# Patient Record
Sex: FEMALE | Race: WHITE | Employment: OTHER | URBAN - METROPOLITAN AREA
[De-identification: names, ages, dates, MRNs, and addresses within clinical notes are randomized per-mention and may not be internally consistent; named-entity substitution may affect disease eponyms.]

---

## 2017-10-05 ENCOUNTER — OFFICE VISIT (OUTPATIENT)
Dept: INTERNAL MEDICINE CLINIC | Age: 77
End: 2017-10-05

## 2017-10-05 VITALS
TEMPERATURE: 98.1 F | DIASTOLIC BLOOD PRESSURE: 111 MMHG | BODY MASS INDEX: 30.82 KG/M2 | HEIGHT: 65 IN | SYSTOLIC BLOOD PRESSURE: 172 MMHG | RESPIRATION RATE: 12 BRPM | HEART RATE: 74 BPM | WEIGHT: 185 LBS

## 2017-10-05 DIAGNOSIS — K63.5 POLYP OF COLON, UNSPECIFIED PART OF COLON, UNSPECIFIED TYPE: ICD-10-CM

## 2017-10-05 DIAGNOSIS — F41.8 SITUATIONAL ANXIETY: Primary | ICD-10-CM

## 2017-10-05 DIAGNOSIS — C50.911 MALIGNANT NEOPLASM OF RIGHT FEMALE BREAST, UNSPECIFIED ESTROGEN RECEPTOR STATUS, UNSPECIFIED SITE OF BREAST (HCC): ICD-10-CM

## 2017-10-05 DIAGNOSIS — I10 BENIGN ESSENTIAL HTN: ICD-10-CM

## 2017-10-05 DIAGNOSIS — N89.8 VAGINAL DISCHARGE: ICD-10-CM

## 2017-10-05 RX ORDER — LANOLIN ALCOHOL/MO/W.PET/CERES
1000 CREAM (GRAM) TOPICAL DAILY
COMMUNITY
End: 2018-11-15 | Stop reason: ALTCHOICE

## 2017-10-05 NOTE — MR AVS SNAPSHOT
Visit Information Date & Time Provider Department Dept. Phone Encounter #  
 10/5/2017 11:00 AM Ras Richardson MD [facility] [address] [id number] Upcoming Health Maintenance Date Due DTaP/Tdap/Td series (1 - Tdap) 7/9/1962 ZOSTER VACCINE AGE 60> 5/9/2001 GLAUCOMA SCREENING Q2Y 7/9/2006 OSTEOPOROSIS SCREENING (DEXA) 7/9/2006 Pneumococcal 65+ Low/Medium Risk (1 of 2 - PCV13) 7/9/2006 MEDICARE YEARLY EXAM 7/9/2006 INFLUENZA AGE 9 TO ADULT 8/1/2017 Allergies as of 10/5/2017  Review Complete On: 10/5/2017 By: Ras Richardson MD  
 No Known Allergies Current Immunizations  Never Reviewed No immunizations on file. Not reviewed this visit You Were Diagnosed With   
  
 Codes Comments Situational anxiety    -  Primary ICD-10-CM: F41.8 ICD-9-CM: 300.09 Benign essential HTN     ICD-10-CM: I10 
ICD-9-CM: 401.1 Vaginal discharge     ICD-10-CM: N89.8 ICD-9-CM: 623.5 Polyp of colon, unspecified part of colon, unspecified type     ICD-10-CM: K63.5 ICD-9-CM: 211.3 Vitals BP Pulse Temp Resp Height(growth percentile) Weight(growth percentile) (!) 172/111 (BP 1 Location: Left arm, BP Patient Position: Sitting) 74 98.1 °F (36.7 °C) (Oral) 12 5' 5\" (1.651 m) 185 lb (83.9 kg) BMI OB Status Smoking Status 30.79 kg/m2 Postmenopausal Never Smoker Vitals History BMI and BSA Data Body Mass Index Body Surface Area 30.79 kg/m 2 1.96 m 2 Your Updated Medication List  
  
   
This list is accurate as of: 10/5/17 12:01 PM.  Always use your most recent med list.  
  
  
  
  
 VITAMIN B-12 1,000 mcg tablet Generic drug:  cyanocobalamin Take 1,000 mcg by mouth daily. We Performed the Following REFERRAL TO OBSTETRICS AND GYNECOLOGY [REF51 Custom] Comments:  
 Please evaluate patient for vaginal discharge. Referral Information Referral ID Referred By Referred To  
  
 [id number] Delmis HILL MD   
   [address] Phone: [phone] Fax: [phone] Visits Status Start Date End Date 1 New Request 10/5/17 10/5/18 If your referral has a status of pending review or denied, additional information will be sent to support the outcome of this decision. Introducing [facility]! Naresh Balbuena introduces [employer] patient portal. Now you can access parts of your medical record, email your doctor's office, and request medication refills online. 1. In your internet browser, go to [email or web address]. Take the Interview/[email or web address] 2. Click on the First Time User? Click Here link in the Sign In box. You will see the New Member Sign Up page. 3. Enter your InH[employer] Access Code exactly as it appears below. You will not need to use this code after youve completed the sign-up process. If you do not sign up before the expiration date, you must request a new code. · [employer] Access Code: [email or web address] Expires: 1/3/2018 10:43 AM 
 
4. Enter the last four digits of your Social Security Number (xxxx) and Date of Birth (mm/dd/yyyy) as indicated and click Submit. You will be taken to the next sign-up page. 5. Create a InHiro ID. This will be your InHiro login ID and cannot be changed, so think of one that is secure and easy to remember. 6. Create a InH[employer] password. You can change your password at any time. 7. Enter your Password Reset Question and Answer. This can be used at a later time if you forget your password. 8. Enter your e-mail address. You will receive e-mail notification when new information is available in [address] 9. Click Sign Up. You can now view and download portions of your medical record. 10. Click the Download Summary menu link to download a portable copy of your medical information. If you have questions, please visit the Frequently Asked Questions section of the [employer] website. Remember, [employer] is NOT to be used for urgent needs. For medical emergencies, dial 911. Now available from your iPhone and Android! Please provide this summary of care documentation to your next provider. If you have any questions after today's visit, please call [phone].

## 2017-10-05 NOTE — PROGRESS NOTES
HISTORY OF PRESENT ILLNESS    New patient to my practice, referred to me by her son Corry Minaya who works for [employer]. Prior medical care has been from [place]. 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 her  past medical history was reviewed, discussed, and summarized in the list below. Moved from [place] in June. She is anxious and a somewhat tangental historian. Has \"gut issues\". Feels her stress is in her gut a lot of the time. Hypertension- says \"it is always high since I am anxious at [address] take medications\". Hypertension ROS: no medication side effects noted, no TIA's, no chest pain on exertion, no dyspnea on exertion, no swelling of ankles     reports that she has never smoked. She has never used smokeless tobacco.    reports that she drinks about 1.2 oz of alcohol per week    BP Readings from Last 2 Encounters:   10/05/17 (!) 172/111     Seen in urgent care 9/2/17 [facility]. Was dx w UTI and was given 10 days of bactrim which she took. Caused abd cramps. Believe she got this infection since someone defecated in a pool. [place] did not agree with her. Would like a referral to Gyn. Has a vaginal discharge. Thinks amada. Had colonoscopy in 2016. Had a benign polyp. Recommended repeat 2019.  with dementia s/s and irritability. He has not been evaluated for this since he has not seen a doctor. His is not well bathed (but then says he showers every other day). or well groomed. He can come across as calm, so looks better than he is. This is stressful. He is spilling things, having small MVAs. She is afraid to confront him, but he is not violent with her. Living at home is chaotic. She says he house is in boxes and \"it is a terrible life\". \"every day is hell\". Justin Townsend is her son-in-law and can not confront him.        Review of Systems   All other systems reviewed and are negative, except as noted in HPI      Past Medical and Surgical History  Past Medical History:   Diagnosis Date    Benign essential HTN 10/5/2017    Breast cancer ([facility] [address] 11/2004    right  - s/p lumpectomy w sentinal node 11/2004. s/p XRT 1 mo.  could not tolerate tamoxifen    IBS (irritable bowel syndrome)     Situational anxiety      with dementia    UTI (urinary tract infection)     recurrent. last 9/2017        has a past surgical history that includes cholecystectomy; knee arthroscopy (Right, 04/11/2017); and colonoscopy (2016). Current Outpatient Prescriptions   Medication Sig    cyanocobalamin (VITAMIN B-12) 1,000 mcg tablet Take 1,000 mcg by mouth daily. No current facility-administered medications for this visit. reports that she has never smoked. She has never used smokeless tobacco. She reports that she drinks about 1.2 oz of alcohol per week     family history includes Diabetes in her father; Heart Disease in her father; Stroke in her mother. Physical Exam   Nursing note and vitals reviewed. Blood pressure (!) 172/111, pulse 74, temperature 98.1 °F (36.7 °C), temperature source Oral, resp. rate 12, height 5' 5\" (1.651 m), weight 185 lb (83.9 kg). Constitutional: oriented to person, place, and time. No distress. Eyes: Conjunctivae are normal.   HEENT:  No cervical lymphadenopathy. No thyroid nodules or goiter  Cardiovascular: Normal rate. Regular rhythm, no murmurs, rubs. No edema  Pulmonary/Chest: Effort normal. clear to auscultation  Abdominal: soft, non-tender, non-distended  Musculoskeletal:     Neurological: Alert and oriented to person, place. Cranial nerves grossly intact. Normal gait   Skin: No rash noted. Psychiatric: Normal mood and affect. Behavior is normal.     ASSESSMENT and PLAN  Diagnoses and all orders for this visit:    1. Situational anxiety  This is significant. She does not feel safe at home. She has a poor quality of life dealing with her  with dementia.   Advised consideration of an SSRI, she declined. 2. Benign essential HTN -severe, uncontrolled. She is aware of risk of untreated HTN, including sudden death, stroke, MI. She feels stress causes some of it and also refuses to take medications since she is \"sensitive to [employer]". Discussed the high risk of not taking medications. I think beta-blocker would help this an anxiety    3. Vaginal discharge  -     REFERRAL TO OBSTETRICS AND GYNECOLOGY    4. Polyp of colon, unspecified part of colon, unspecified type - repeat 2019? Will get report    5.  Malignant neoplasm of right female breast, unspecified estrogen receptor status, unspecified site of breast ([facility] [address] 2004  Will get records        There are no Patient Instructions on file for this visit.    lab results and schedule of future lab studies reviewed with patient  reviewed medications and side effects in detail    Follow-up Disposition: Not on File

## 2017-10-05 NOTE — PROGRESS NOTES
States her son selected you. . Moved from [place] in June. Has gut issues. Got a bacterial infection from a pool. [place] did not agree with her. Would like a referral to Gyn. Has a vaginal discharge. Thinks amada. Had colonoscopy a few years ago. Had a benign polyp. Recommended repeat 2019.  with dementia. This is stressful.

## 2017-11-13 ENCOUNTER — HOSPITAL ENCOUNTER (EMERGENCY)
Age: 77
Discharge: HOME | End: 2017-11-13
Attending: EMERGENCY MEDICINE
Payer: MEDICARE

## 2017-11-13 ENCOUNTER — APPOINTMENT (OUTPATIENT)
Dept: CT IMAGING | Age: 77
End: 2017-11-13
Attending: EMERGENCY MEDICINE
Payer: MEDICARE

## 2017-11-13 ENCOUNTER — APPOINTMENT (OUTPATIENT)
Dept: GENERAL RADIOLOGY | Age: 77
End: 2017-11-13
Attending: EMERGENCY MEDICINE
Payer: MEDICARE

## 2017-11-13 VITALS
HEART RATE: 69 BPM | SYSTOLIC BLOOD PRESSURE: 145 MMHG | TEMPERATURE: 98.4 F | OXYGEN SATURATION: 98 % | RESPIRATION RATE: 12 BRPM | HEIGHT: 65 IN | BODY MASS INDEX: 31.22 KG/M2 | DIASTOLIC BLOOD PRESSURE: 63 MMHG | WEIGHT: 187.39 LBS

## 2017-11-13 DIAGNOSIS — R51.9 NONINTRACTABLE HEADACHE, UNSPECIFIED CHRONICITY PATTERN, UNSPECIFIED HEADACHE TYPE: Primary | ICD-10-CM

## 2017-11-13 LAB
ALBUMIN SERPL-MCNC: 3.7 G/DL (ref 3.5–5)
ALBUMIN/GLOB SERPL: 1.1 {RATIO} (ref 1.1–2.2)
ALP SERPL-CCNC: 112 U/L (ref 45–117)
ALT SERPL-CCNC: 20 U/L (ref 12–78)
ANION GAP SERPL CALC-SCNC: 9 MMOL/L (ref 5–15)
APPEARANCE UR: CLEAR
AST SERPL-CCNC: 16 U/L (ref 15–37)
ATRIAL RATE: 81 BPM
BACTERIA URNS QL MICRO: NEGATIVE /HPF
BASOPHILS # BLD: 0 K/UL (ref 0–0.1)
BASOPHILS NFR BLD: 0 % (ref 0–1)
BILIRUB SERPL-MCNC: 0.2 MG/DL (ref 0.2–1)
BILIRUB UR QL: NEGATIVE
BUN SERPL-MCNC: 19 MG/DL (ref 6–20)
BUN/CREAT SERPL: 27 (ref 12–20)
CALCIUM SERPL-MCNC: 9.7 MG/DL (ref 8.5–10.1)
CALCULATED P AXIS, ECG09: 42 DEGREES
CALCULATED R AXIS, ECG10: 0 DEGREES
CALCULATED T AXIS, ECG11: 45 DEGREES
CHLORIDE SERPL-SCNC: 104 MMOL/L (ref 97–108)
CK MB CFR SERPL CALC: 1.4 % (ref 0–2.5)
CK MB SERPL-MCNC: 1.4 NG/ML (ref 5–25)
CK SERPL-CCNC: 102 U/L (ref 26–192)
CO2 SERPL-SCNC: 27 MMOL/L (ref 21–32)
COLOR UR: ABNORMAL
CREAT SERPL-MCNC: 0.7 MG/DL (ref 0.55–1.02)
DIAGNOSIS, 93000: NORMAL
DIFFERENTIAL METHOD BLD: NORMAL
EOSINOPHIL # BLD: 0.2 K/UL (ref 0–0.4)
EOSINOPHIL NFR BLD: 2 % (ref 0–7)
EPITH CASTS URNS QL MICRO: ABNORMAL /LPF
ERYTHROCYTE [DISTWIDTH] IN BLOOD BY AUTOMATED COUNT: 13.9 % (ref 11.5–14.5)
GLOBULIN SER CALC-MCNC: 3.3 G/DL (ref 2–4)
GLUCOSE BLD STRIP.AUTO-MCNC: 109 MG/DL (ref 65–100)
GLUCOSE SERPL-MCNC: 105 MG/DL (ref 65–100)
GLUCOSE UR STRIP.AUTO-MCNC: NEGATIVE MG/DL
HCT VFR BLD AUTO: 39.6 % (ref 35–47)
HGB BLD-MCNC: 12.9 G/DL (ref 11.5–16)
HGB UR QL STRIP: ABNORMAL
KETONES UR QL STRIP.AUTO: NEGATIVE MG/DL
LEUKOCYTE ESTERASE UR QL STRIP.AUTO: NEGATIVE
LYMPHOCYTES # BLD: 2.7 K/UL (ref 0.8–3.5)
LYMPHOCYTES NFR BLD: 32 % (ref 12–49)
MAGNESIUM SERPL-MCNC: 2 MG/DL (ref 1.6–2.4)
MCH RBC QN AUTO: 29.3 PG (ref 26–34)
MCHC RBC AUTO-ENTMCNC: 32.6 G/DL (ref 30–36.5)
MCV RBC AUTO: 89.8 FL (ref 80–99)
MONOCYTES # BLD: 0.6 K/UL (ref 0–1)
MONOCYTES NFR BLD: 7 % (ref 5–13)
NEUTS SEG # BLD: 4.9 K/UL (ref 1.8–8)
NEUTS SEG NFR BLD: 59 % (ref 32–75)
NITRITE UR QL STRIP.AUTO: NEGATIVE
P-R INTERVAL, ECG05: 160 MS
PH UR STRIP: 6 [PH] (ref 5–8)
PLATELET # BLD AUTO: 277 K/UL (ref 150–400)
POTASSIUM SERPL-SCNC: 3.9 MMOL/L (ref 3.5–5.1)
PROT SERPL-MCNC: 7 G/DL (ref 6.4–8.2)
PROT UR STRIP-MCNC: NEGATIVE MG/DL
Q-T INTERVAL, ECG07: 368 MS
QRS DURATION, ECG06: 82 MS
QTC CALCULATION (BEZET), ECG08: 427 MS
RBC # BLD AUTO: 4.41 M/UL (ref 3.8–5.2)
RBC #/AREA URNS HPF: ABNORMAL /HPF (ref 0–5)
SERVICE CMNT-IMP: ABNORMAL
SODIUM SERPL-SCNC: 140 MMOL/L (ref 136–145)
SP GR UR REFRACTOMETRY: <1.005 (ref 1–1.03)
TROPONIN I SERPL-MCNC: <0.04 NG/ML
UA: UC IF INDICATED,UAUC: ABNORMAL
UROBILINOGEN UR QL STRIP.AUTO: 0.2 EU/DL (ref 0.2–1)
VENTRICULAR RATE, ECG03: 81 BPM
WBC # BLD AUTO: 8.3 K/UL (ref 3.6–11)
WBC URNS QL MICRO: ABNORMAL /HPF (ref 0–4)

## 2017-11-13 PROCEDURE — 85025 COMPLETE CBC W/AUTO DIFF WBC: CPT | Performed by: EMERGENCY MEDICINE

## 2017-11-13 PROCEDURE — 93005 ELECTROCARDIOGRAM TRACING: CPT

## 2017-11-13 PROCEDURE — 71020 XR CHEST PA LAT: CPT

## 2017-11-13 PROCEDURE — 99285 EMERGENCY DEPT VISIT HI MDM: CPT

## 2017-11-13 PROCEDURE — 70450 CT HEAD/BRAIN W/O DYE: CPT

## 2017-11-13 PROCEDURE — 80053 COMPREHEN METABOLIC PANEL: CPT | Performed by: EMERGENCY MEDICINE

## 2017-11-13 PROCEDURE — 36415 COLL VENOUS BLD VENIPUNCTURE: CPT | Performed by: EMERGENCY MEDICINE

## 2017-11-13 PROCEDURE — 94762 N-INVAS EAR/PLS OXIMTRY CONT: CPT

## 2017-11-13 PROCEDURE — 82962 GLUCOSE BLOOD TEST: CPT

## 2017-11-13 PROCEDURE — 81001 URINALYSIS AUTO W/SCOPE: CPT | Performed by: EMERGENCY MEDICINE

## 2017-11-13 PROCEDURE — 84484 ASSAY OF TROPONIN QUANT: CPT | Performed by: EMERGENCY MEDICINE

## 2017-11-13 PROCEDURE — 82550 ASSAY OF CK (CPK): CPT | Performed by: EMERGENCY MEDICINE

## 2017-11-13 PROCEDURE — 83735 ASSAY OF MAGNESIUM: CPT | Performed by: EMERGENCY MEDICINE

## 2017-11-13 NOTE — ED TRIAGE NOTES
Patient presents via wheelchair to treatment area. Patient complains of feeling dizzy when she stood up while working around the house today. Patient then began with a headache and feeling \"bloated\" in her head about one half hour PTA. Patient states she took two aspirin at time on onset. States she feels pressure behind her eyes. No unilateral weakness.

## 2017-11-13 NOTE — ED NOTES
Patient returned from CT in no distress. Patient ambulatory to restroom with one assist; gait steady. Spouse remains at bedside.

## 2017-11-13 NOTE — ED NOTES
The patient was discharged home by Dr. Víctor Caceres in stable condition. The patient is alert and oriented, in no respiratory distress and discharge vital signs obtained. The patient's diagnosis, condition and treatment were explained. The patient expressed understanding. No prescriptions given. No work/school note given. A discharge plan has been developed. A  was not involved in the process. Aftercare instructions were given. Pt ambulatory out of the ED with spouse.

## 2017-11-13 NOTE — DISCHARGE INSTRUCTIONS
Headache: Care Instructions  Your Care Instructions    Headaches have many possible causes. Most headaches aren't a sign of a more serious problem, and they will get better on their own. Home treatment may help you feel better faster. The doctor has checked you carefully, but problems can develop later. If you notice any problems or new symptoms, get medical treatment right away. Follow-up care is a key part of your treatment and safety. Be sure to make and go to all appointments, and call your doctor if you are having problems. It's also a good idea to know your test results and keep a list of the medicines you take. How can you care for yourself at home? · Do not drive if you have taken a prescription pain medicine. · Rest in a quiet, dark room until your headache is gone. Close your eyes and try to relax or go to sleep. Don't watch TV or read. · Put a cold, moist cloth or cold pack on the painful area for 10 to 20 minutes at a time. Put a thin cloth between the cold pack and your skin. · Use a warm, moist towel or a heating pad set on low to relax tight shoulder and neck muscles. · Have someone gently massage your neck and shoulders. · Take pain medicines exactly as directed. ¨ If the doctor gave you a prescription medicine for pain, take it as prescribed. ¨ If you are not taking a prescription pain medicine, ask your doctor if you can take an over-the-counter medicine. · Be careful not to take pain medicine more often than the instructions allow, because you may get worse or more frequent headaches when the medicine wears off. · Do not ignore new symptoms that occur with a headache, such as a fever, weakness or numbness, vision changes, or confusion. These may be signs of a more serious problem. To prevent headaches  · Keep a headache diary so you can figure out what triggers your headaches. Avoiding triggers may help you prevent headaches.  Record when each headache began, how long it lasted, and what the pain was like (throbbing, aching, stabbing, or dull). Write down any other symptoms you had with the headache, such as nausea, flashing lights or dark spots, or sensitivity to bright light or loud noise. Note if the headache occurred near your period. List anything that might have triggered the headache, such as certain foods (chocolate, cheese, wine) or odors, smoke, bright light, stress, or lack of sleep. · Find healthy ways to deal with stress. Headaches are most common during or right after stressful times. Take time to relax before and after you do something that has caused a headache in the past.  · Try to keep your muscles relaxed by keeping good posture. Check your jaw, face, neck, and shoulder muscles for tension, and try relaxing them. When sitting at a desk, change positions often, and stretch for 30 seconds each hour. · Get plenty of sleep and exercise. · Eat regularly and well. Long periods without food can trigger a headache. · Treat yourself to a massage. Some people find that regular massages are very helpful in relieving tension. · Limit caffeine by not drinking too much coffee, tea, or soda. But don't quit caffeine suddenly, because that can also give you headaches. · Reduce eyestrain from computers by blinking frequently and looking away from the computer screen every so often. Make sure you have proper eyewear and that your monitor is set up properly, about an arm's length away. · Seek help if you have depression or anxiety. Your headaches may be linked to these conditions. Treatment can both prevent headaches and help with symptoms of anxiety or depression. When should you call for help? Call 911 anytime you think you may need emergency care. For example, call if:  ? · You have signs of a stroke. These may include:  ¨ Sudden numbness, paralysis, or weakness in your face, arm, or leg, especially on only one side of your body. ¨ Sudden vision changes.   ¨ Sudden trouble speaking. ¨ Sudden confusion or trouble understanding simple statements. ¨ Sudden problems with walking or balance. ¨ A sudden, severe headache that is different from past headaches. ?Call your doctor now or seek immediate medical care if:  ? · You have a new or worse headache. ? · Your headache gets much worse. Where can you learn more? Go to http://abdi-saroj.info/. Enter M271 in the search box to learn more about \"Headache: Care Instructions. \"  Current as of: October 14, 2016  Content Version: 11.4  © 1265-3469 [employer]. Care instructions adapted under license by [employer] (which disclaims liability or warranty for this information). If you have questions about a medical condition or this instruction, always ask your healthcare professional. [address] any warranty or liability for your use of this information. We hope that we have addressed all of your medical concerns. The examination and treatment you received in the Emergency Department were for an emergent problem and were not intended as complete care. It is important that you follow up with your healthcare provider(s) for ongoing care. If your symptoms worsen or do not improve as expected, and you are unable to reach your usual health care provider(s), you should return to the Emergency Department. Today's healthcare is undergoing tremendous change, and patient satisfaction surveys are one of the many tools to assess the quality of medical care. You may receive a survey from the [employer] regarding your experience in the Emergency Department. I hope that your experience has been completely positive, particularly the medical care that I provided. As such, please participate in the survey; anything less than excellent does not meet my expectations or intentions.         [facility] and [address] [place] [address] participate in nationally recognized quality of care measures. If your blood pressure is greater than 120/80, as reported below, we urge that you seek medical care to address the potential of high blood pressure, commonly known as hypertension. Hypertension can be hereditary or can be caused by certain medical conditions, pain, stress, or \"white coat syndrome. \"       Please make an appointment with your health care provider(s) for follow up of your Emergency Department visit. VITALS:   Patient Vitals for the past 8 hrs:   Temp Pulse Resp BP SpO2   11/13/17 1700 - 69 12 145/63 98 %   11/13/17 1652 - 68 16 - 99 %   11/13/17 1651 - - - 164/72 -   11/13/17 1600 - 75 13 160/78 97 %   11/13/17 1545 - 77 10 171/73 99 %   11/13/17 1530 - 81 23 163/77 98 %   11/13/17 1522 98.4 °F (36.9 °C) 81 15 167/83 98 %   11/13/17 1515 - 82 13 163/79 97 %          Thank you for allowing us to provide you with medical care today. We realize that you have many choices for your emergency care needs. Please choose us in the future for any continued health care needs. Aileen Ling  [facility], [address] [place] [address].   Office: [phone]            Recent Results (from the past 24 hour(s))   EKG, 12 LEAD, INITIAL    Collection Time: 11/13/17  3:13 PM   Result Value Ref Range    Ventricular Rate 81 BPM    Atrial Rate 81 BPM    P-R Interval 160 ms    QRS Duration 82 ms    Q-T Interval 368 ms    QTC Calculation (Bezet) 427 ms    Calculated P Axis 42 degrees    Calculated R Axis 0 degrees    Calculated T Axis 45 degrees    Diagnosis       Normal sinus rhythm  Moderate voltage criteria for LVH, may be normal variant  Otherwise normal ECG  No previous ECGs available  Confirmed by Alona Aase MD, [id number] ([id number]) on 11/13/2017 3:49:49 PM     GLUCOSE, POC    Collection Time: 11/13/17  3:15 PM   Result Value Ref Range    Glucose (POC) 109 (H) 65 - 100 mg/dL    Performed by Momo Pratt (PCT)    CBC WITH AUTOMATED DIFF    Collection Time: 11/13/17  3:21 PM   Result Value Ref Range    WBC 8.3 3.6 - 11.0 K/uL    RBC 4.41 3.80 - 5.20 M/uL    HGB 12.9 11.5 - 16.0 g/dL    HCT 39.6 35.0 - 47.0 %    MCV 89.8 80.0 - 99.0 FL    MCH 29.3 26.0 - 34.0 PG    MCHC 32.6 30.0 - 36.5 g/dL    RDW 13.9 11.5 - 14.5 %    PLATELET 478 586 - 064 K/uL    NEUTROPHILS 59 32 - 75 %    LYMPHOCYTES 32 12 - 49 %    MONOCYTES 7 5 - 13 %    EOSINOPHILS 2 0 - 7 %    BASOPHILS 0 0 - 1 %    ABS. NEUTROPHILS 4.9 1.8 - 8.0 K/UL    ABS. LYMPHOCYTES 2.7 0.8 - 3.5 K/UL    ABS. MONOCYTES 0.6 0.0 - 1.0 K/UL    ABS. EOSINOPHILS 0.2 0.0 - 0.4 K/UL    ABS. BASOPHILS 0.0 0.0 - 0.1 K/UL    DF AUTOMATED     METABOLIC PANEL, COMPREHENSIVE    Collection Time: 11/13/17  3:21 PM   Result Value Ref Range    Sodium 140 136 - 145 mmol/L    Potassium 3.9 3.5 - 5.1 mmol/L    Chloride 104 97 - 108 mmol/L    CO2 27 21 - 32 mmol/L    Anion gap 9 5 - 15 mmol/L    Glucose 105 (H) 65 - 100 mg/dL    BUN 19 6 - 20 MG/DL    Creatinine 0.70 0.55 - 1.02 MG/DL    BUN/Creatinine ratio 27 (H) 12 - 20      GFR est AA >60 >60 ml/min/1.73m2    GFR est non-AA >60 >60 ml/min/1.73m2    Calcium 9.7 8.5 - 10.1 MG/DL    Bilirubin, total 0.2 0.2 - 1.0 MG/DL    ALT (SGPT) 20 12 - 78 U/L    AST (SGOT) 16 15 - 37 U/L    Alk.  phosphatase 112 45 - 117 U/L    Protein, total 7.0 6.4 - 8.2 g/dL    Albumin 3.7 3.5 - 5.0 g/dL    Globulin 3.3 2.0 - 4.0 g/dL    A-G Ratio 1.1 1.1 - 2.2     CK W/ CKMB & INDEX    Collection Time: 11/13/17  3:21 PM   Result Value Ref Range     26 - 192 U/L    CK - MB 1.4 <3.6 NG/ML    CK-MB Index 1.4 0 - 2.5     MAGNESIUM    Collection Time: 11/13/17  3:21 PM   Result Value Ref Range    Magnesium 2.0 1.6 - 2.4 mg/dL   TROPONIN I    Collection Time: 11/13/17  3:21 PM   Result Value Ref Range    Troponin-I, Qt. <0.04 <0.08 ng/mL   URINALYSIS W/ REFLEX CULTURE    Collection Time: 11/13/17  4:56 PM   Result Value Ref Range    Color YELLOW/STRAW      Appearance CLEAR CLEAR Specific gravity <1.005 1.003 - 1.030    pH (UA) 6.0 5.0 - 8.0      Protein NEGATIVE  NEG mg/dL    Glucose NEGATIVE  NEG mg/dL    Ketone NEGATIVE  NEG mg/dL    Bilirubin NEGATIVE  NEG      Blood TRACE (A) NEG      Urobilinogen 0.2 0.2 - 1.0 EU/dL    Nitrites NEGATIVE  NEG      Leukocyte Esterase NEGATIVE  NEG      WBC 0-4 0 - 4 /hpf    RBC 0-5 0 - 5 /hpf    Epithelial cells FEW FEW /lpf    Bacteria NEGATIVE  NEG /hpf    UA:UC IF INDICATED CULTURE NOT INDICATED BY UA RESULT CNI         Xr Chest Pa Lat    Result Date: 11/13/2017  CLINICAL HISTORY: Chest pain INDICATION: Chest pain COMPARISON: None FINDINGS: PA and lateral views of the chest are obtained. The cardiopericardial silhouette is within normal limits. There is no pleural effusion, pneumothorax or focal consolidation present. There are right axillary surgical clips. IMPRESSION: No acute intrathoracic disease. Ct Head Wo Cont    Result Date: 11/13/2017  EXAM:  CT HEAD WO CONT INDICATION:   headache COMPARISON: None. CONTRAST:  None. TECHNIQUE: Unenhanced CT of the head was performed using 5 mm images. Brain and bone windows were generated. CT dose reduction was achieved through use of a standardized protocol tailored for this examination and automatic exposure control for dose modulation. Adaptive statistical iterative reconstruction (ASIR) was utilized. FINDINGS: The ventricles and sulci are normal in size, shape and configuration and midline. There is no significant white matter disease. There is no intracranial hemorrhage, extra-axial collection, mass, mass effect or midline shift. The basilar cisterns are open. No acute infarct is identified. The bone windows demonstrate no abnormalities. The visualized portions of the paranasal sinuses and mastoid air cells are clear.      IMPRESSION: No acute intracranial abnormality
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
## 2017-11-13 NOTE — ED NOTES
Patient remains resting on stretcher in no distress. Vital signs updated. [place] given for comfort as requested. Patient voices no further requests or concerns at this time. Call bell in reach. No change in patient condition since presentation to ED. Family at bedside.

## 2017-11-13 NOTE — ED NOTES
Dr. Angie Cortes at bedside to evaluate patient. Patient in no distress with spouse at bedside. Dr. Angie Cortes explained plan of care and time constraints' patient verbalizes understanding and agreement. Call bell in reach. Will continue to monitor.

## 2017-11-13 NOTE — ED NOTES
Hourly rounding completed. Toileting offered, ongoing plan of care discussed and pts concerns/questions addressed. Pt informed of time factors with lab/imaging study results. Call bell within reach; will continue to monitor.

## 2017-11-13 NOTE — ED NOTES
Patient repositioned in bed and lights dimmed for comfort. Patient voided 625mL clear yellow urine without difficulty. Patient tolerated ambulation well. Call bell in reach. Will continue to monitor.

## 2017-11-14 ENCOUNTER — TELEPHONE (OUTPATIENT)
Dept: INTERNAL MEDICINE CLINIC | Age: 77
End: 2017-11-14
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
## 2017-11-15 ENCOUNTER — HOSPITAL ENCOUNTER (OUTPATIENT)
Dept: LAB | Age: 77
Discharge: HOME | End: 2017-11-15
Payer: MEDICARE

## 2017-11-15 DIAGNOSIS — Z11.3 SCREEN FOR STD (SEXUALLY TRANSMITTED DISEASE): Primary | ICD-10-CM

## 2017-11-15 PROCEDURE — 86592 SYPHILIS TEST NON-TREP QUAL: CPT

## 2017-11-16 LAB — RPR SER QL: NON REACTIVE

## 2017-11-20 ENCOUNTER — TELEPHONE (OUTPATIENT)
Dept: INTERNAL MEDICINE CLINIC | Age: 77
End: 2017-11-20
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
## 2017-12-21 ENCOUNTER — OFFICE VISIT (OUTPATIENT)
Dept: INTERNAL MEDICINE CLINIC | Age: 77
End: 2017-12-21

## 2017-12-21 DIAGNOSIS — F43.22 ADJUSTMENT DISORDER WITH ANXIOUS MOOD: Primary | ICD-10-CM
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
## 2018-03-26 RX ORDER — AMOXICILLIN 500 MG/1
CAPSULE ORAL
Qty: 4 CAP | Refills: 4 | Status: SHIPPED | OUTPATIENT
Start: 2018-03-26 | End: 2019-01-30 | Stop reason: ALTCHOICE

## 2018-04-18 ENCOUNTER — TELEPHONE (OUTPATIENT)
Dept: INTERNAL MEDICINE CLINIC | Age: 78
End: 2018-04-18

## 2018-04-18 NOTE — TELEPHONE ENCOUNTER
----- Message from Tricia Flores sent at 4/18/2018  2:45 PM EDT -----  Regarding: Dr. Sirena Piedra  Patient is having a decrease in appetite, achy in back, bloating, feeling nauseous, 'uneasy' feeling in stomach. Hard to pass stools. She is having this issue for 3 weeks. She stated she is showing signs of 'pancreatic cancer'. She feels under stress because she just doesn't feel well. Best contact number is [phone].

## 2018-04-19 ENCOUNTER — OFFICE VISIT (OUTPATIENT)
Dept: INTERNAL MEDICINE CLINIC | Age: 78
End: 2018-04-19

## 2018-04-19 VITALS
HEIGHT: 65 IN | OXYGEN SATURATION: 95 % | TEMPERATURE: 97.4 F | SYSTOLIC BLOOD PRESSURE: 130 MMHG | WEIGHT: 189.8 LBS | RESPIRATION RATE: 16 BRPM | BODY MASS INDEX: 31.62 KG/M2 | DIASTOLIC BLOOD PRESSURE: 85 MMHG | HEART RATE: 74 BPM

## 2018-04-19 DIAGNOSIS — Z85.3 HISTORY OF BREAST CANCER: ICD-10-CM

## 2018-04-19 DIAGNOSIS — K58.1 IRRITABLE BOWEL SYNDROME WITH CONSTIPATION: ICD-10-CM

## 2018-04-19 DIAGNOSIS — R63.0 DECREASED APPETITE: ICD-10-CM

## 2018-04-19 DIAGNOSIS — R10.10 UPPER ABDOMINAL PAIN: Primary | ICD-10-CM

## 2018-04-19 RX ORDER — MELATONIN
DAILY
COMMUNITY
End: 2019-06-15

## 2018-04-19 NOTE — PROGRESS NOTES
HISTORY OF PRESENT ILLNESS    Had tooth extraction 4/16. Presents with concerns that she could have pancreatic cancer  Symptoms of upper abdominal discomfort, constipation. It is improved somewhat  Associated symptoms include: none   Treatments tried include: medication not used  Colonoscopy 2016 small polyp  Hx lay  Hx IBS  She is swimming and exercising. Limited by knee pain    Wt Readings from Last 3 Encounters:   04/19/18 189 lb 12.8 oz (86.1 kg)   11/13/17 187 lb 6.3 oz (85 kg)   10/05/17 185 lb (83.9 kg)     Mild left throat itchyness    Review of Systems   All other systems reviewed and are negative, except as noted in HPI    Past Medical and Surgical History   has a past medical history of Benign essential HTN (10/5/2017); Breast cancer ([facility] [address] (11/2004); IBS (irritable bowel syndrome); Situational anxiety; and UTI (urinary tract infection). has a past surgical history that includes hx cholecystectomy; hx knee arthroscopy (Right, 04/11/2017); hx colonoscopy (2016); and hx thyroidectomy (2004). reports that she has never smoked. She has never used smokeless tobacco. She reports that she drinks about 1.2 oz of alcohol per week  She reports that she does not use illicit drugs. family history includes Diabetes in her father; Heart Disease in her father; Stroke in her mother; Thyroid Cancer in her brother. Physical Exam   Nursing note and vitals reviewed. Blood pressure 130/85, pulse 74, temperature 97.4 °F (36.3 °C), temperature source Oral, resp. rate 16, height 5' 5\" (1.651 m), weight 189 lb 12.8 oz (86.1 kg), SpO2 95 %. Constitutional: In no distress. Eyes: Conjunctivae are normal.  HEENT:  No LAD or thyromegaly   Cardiovascular: Normal rate. regular rhythm. No murmurs  No edema  Pulmonary/Chest: Effort normal. clear to ausculation blaterally  Musculoskeletal:  no edema. Abd:  Neurological: Alert and oriented. Grossly intact cranial nerves and motor function. Skin: No rash noted. Psychiatric: Normal mood and affect. Behavior is normal.     ASSESSMENT and PLAN  Diagnoses and all orders for this visit:    1. Upper abdominal pain- resolved  2. History of breast cancer  3. Decreased appetite  4. Irritable bowel syndrome with constipation  Current s/s are resolved and likely due to IBS/constipation. No alarm s/s. I told her I would order CT prn worsening s/s or unexplained weight loss. lab results and schedule of future lab studies reviewed with patient  reviewed medications and side effects in detail  Return to clinic for further evaluation if new symptoms develop or if current symptoms worsen or fail to resolve.

## 2018-04-19 NOTE — PROGRESS NOTES
Florina Vergara is a 69 y.o. female    1. Have you been to the ER, urgent care clinic since your last visit? Hospitalized since your last visit? No    2. Have you seen or consulted any other health care providers outside of the [address] [place] [address] since your last visit? Include any pap smears or colon screening. Yes [facility] on 4/16/18 had 1 tooth removed. Chief Complaint   Patient presents with    Abdominal Pain     nauseous, left side          Visit Vitals    /85 (BP 1 Location: Left arm, BP Patient Position: Sitting)    Pulse 74    Temp 97.4 °F (36.3 °C) (Oral)    Resp 16    Ht 5' 5\" (1.651 m)    Wt 189 lb 12.8 oz (86.1 kg)    SpO2 95%    BMI 31.58 kg/m2     Patient states she thinks she might have pancreatic cancer, she states she read the symptoms in [place] book and she feels she has some of the symptoms.

## 2018-04-19 NOTE — TELEPHONE ENCOUNTER
I spoke with patient, I offered her an appt today at 10:45am. Pt states she will head to the office. She was thankful for the call.

## 2018-10-05 ENCOUNTER — TELEPHONE (OUTPATIENT)
Dept: INTERNAL MEDICINE CLINIC | Age: 78
End: 2018-10-05

## 2018-10-05 NOTE — TELEPHONE ENCOUNTER
Patient is requesting a recommendation for a   American Female GYN provider. Patient states she does not want an  , [place] or any other race due to personal reasons.  She can be reached at [phone]

## 2018-11-15 ENCOUNTER — HOSPITAL ENCOUNTER (OUTPATIENT)
Dept: LAB | Age: 78
Discharge: HOME | End: 2018-11-15
Payer: MEDICARE

## 2018-11-15 ENCOUNTER — OFFICE VISIT (OUTPATIENT)
Dept: INTERNAL MEDICINE CLINIC | Age: 78
End: 2018-11-15

## 2018-11-15 VITALS
HEIGHT: 65 IN | RESPIRATION RATE: 16 BRPM | TEMPERATURE: 97.6 F | WEIGHT: 186.2 LBS | DIASTOLIC BLOOD PRESSURE: 82 MMHG | HEART RATE: 68 BPM | SYSTOLIC BLOOD PRESSURE: 159 MMHG | OXYGEN SATURATION: 95 % | BODY MASS INDEX: 31.02 KG/M2

## 2018-11-15 DIAGNOSIS — Z00.00 MEDICARE ANNUAL WELLNESS VISIT, SUBSEQUENT: Primary | ICD-10-CM

## 2018-11-15 DIAGNOSIS — R53.82 CHRONIC FATIGUE: ICD-10-CM

## 2018-11-15 DIAGNOSIS — Z78.0 ASYMPTOMATIC MENOPAUSAL STATE: ICD-10-CM

## 2018-11-15 DIAGNOSIS — E55.9 VITAMIN D INSUFFICIENCY: ICD-10-CM

## 2018-11-15 DIAGNOSIS — Z85.3 HISTORY OF BREAST CANCER: ICD-10-CM

## 2018-11-15 DIAGNOSIS — Z23 ENCOUNTER FOR IMMUNIZATION: ICD-10-CM

## 2018-11-15 DIAGNOSIS — I10 BENIGN ESSENTIAL HTN: ICD-10-CM

## 2018-11-15 PROBLEM — C50.911 MALIGNANT NEOPLASM OF RIGHT FEMALE BREAST (HCC): Status: RESOLVED | Noted: 2017-10-05 | Resolved: 2018-11-15

## 2018-11-15 PROCEDURE — 80061 LIPID PANEL: CPT

## 2018-11-15 PROCEDURE — 82306 VITAMIN D 25 HYDROXY: CPT

## 2018-11-15 PROCEDURE — 85025 COMPLETE CBC W/AUTO DIFF WBC: CPT

## 2018-11-15 PROCEDURE — 80053 COMPREHEN METABOLIC PANEL: CPT

## 2018-11-15 PROCEDURE — 84443 ASSAY THYROID STIM HORMONE: CPT

## 2018-11-15 PROCEDURE — 36415 COLL VENOUS BLD VENIPUNCTURE: CPT

## 2018-11-15 PROCEDURE — 82607 VITAMIN B-12: CPT

## 2018-11-15 RX ORDER — CYANOCOBALAMIN 1000 UG/ML
1000 INJECTION, SOLUTION INTRAMUSCULAR; SUBCUTANEOUS ONCE
Qty: 1 ML | Refills: 0
Start: 2018-11-15 | End: 2018-11-15

## 2018-11-15 NOTE — PATIENT INSTRUCTIONS
Body Mass Index: Care Instructions Your Care Instructions Body mass index (BMI) can help you see if your weight is raising your risk for health problems. It uses a formula to compare how much you weigh with how tall you are. · A BMI lower than 18.5 is considered underweight. · A BMI between 18.5 and 24.9 is considered healthy. · A BMI between 25 and 29.9 is considered overweight. A BMI of 30 or higher is considered obese. If your BMI is in the normal range, it means that you have a lower risk for weight-related health problems. If your BMI is in the overweight or obese range, you may be at increased risk for weight-related health problems, such as high blood pressure, heart disease, stroke, arthritis or joint pain, and diabetes. If your BMI is in the underweight range, you may be at increased risk for health problems such as fatigue, lower protection (immunity) against illness, muscle loss, bone loss, hair loss, and hormone problems. BMI is just one measure of your risk for weight-related health problems. You may be at higher risk for health problems if you are not active, you eat an unhealthy diet, or you drink too much alcohol or use tobacco products. Follow-up care is a key part of your treatment and safety. Be sure to make and go to all appointments, and call your doctor if you are having problems. It's also a good idea to know your test results and keep a list of the medicines you take. How can you care for yourself at home? · Practice healthy eating habits. This includes eating plenty of fruits, vegetables, whole grains, lean protein, and low-fat dairy. · If your doctor recommends it, get more exercise. Walking is a good choice. Bit by bit, increase the amount you walk every day. Try for at least 30 minutes on most days of the week. · Do not smoke. Smoking can increase your risk for health problems.  If you need help quitting, talk to your doctor about stop-smoking programs and medicines. These can increase your chances of quitting for good. · Limit alcohol to 2 drinks a day for men and 1 drink a day for women. Too much alcohol can cause health problems. If you have a BMI higher than 25 · Your doctor may do other tests to check your risk for weight-related health problems. This may include measuring the distance around your waist. A waist measurement of more than 40 inches in men or 35 inches in women can increase the risk of weight-related health problems. · Talk with your doctor about steps you can take to stay healthy or improve your health. You may need to make lifestyle changes to lose weight and stay healthy, such as changing your diet and getting regular exercise. If you have a BMI lower than 18.5 · Your doctor may do other tests to check your risk for health problems. · Talk with your doctor about steps you can take to stay healthy or improve your health. You may need to make lifestyle changes to gain or maintain weight and stay healthy, such as getting more healthy foods in your diet and doing exercises to build muscle. Where can you learn more? Go to http://abdi-saroj.info/. Enter S176 in the search box to learn more about \"Body Mass Index: Care Instructions. \" Current as of: October 13, 2016 Content Version: 11.4 © 5547-0516 [employer]. Care instructions adapted under license by [employer] (which disclaims liability or warranty for this information). If you have questions about a medical condition or this instruction, always ask your healthcare professional. [address] any warranty or liability for your use of this information.

## 2018-11-15 NOTE — PROGRESS NOTES
HISTORY OF PRESENT ILLNESS Presents with fatigue. Fatigue is been ongoing for several months. Unclear etiology. States she had B12 injections in the past and requests another one. Ongoing stress at home. Removed over a year ago and still had a lot of boxes around her house because her  refuses to   Unpack. Hypertension Hypertension ROS blood pressure is elevated in the clinic today. no TIA's, no chest pain on exertion, no dyspnea on exertion, no swelling of ankles unclear etiology. Depressive symptoms A role. reports that  has never smoked. she has never used smokeless tobacco.    reports that she drinks about 1.2 oz of alcohol per week. BP Readings from Last 2 Encounters:  
11/15/18 159/82  
04/19/18 130/85 Review of Systems All other systems reviewed and are negative, except as noted in HPI Past Medical and Surgical History 
 has a past medical history of Benign essential HTN, History of breast cancer, IBS (irritable bowel syndrome), Situational anxiety, and UTI (urinary tract infection). has a past surgical history that includes hx cholecystectomy; hx knee arthroscopy (Right, 04/11/2017); hx colonoscopy (2016); and hx thyroidectomy (2004). reports that  has never smoked. she has never used smokeless tobacco. She reports that she drinks about 1.2 oz of alcohol per week. She reports that she does not use drugs. family history includes Diabetes in her father; Heart Disease in her father; Stroke in her mother; Thyroid Cancer in her brother. Physical Exam  
Nursing note and vitals reviewed. Blood pressure 159/82, pulse 68, temperature 97.6 °F (36.4 °C), temperature source Oral, resp. rate 16, height 5' 5\" (1.651 m), weight 186 lb 3.2 oz (84.5 kg), SpO2 95 %. Constitutional: In no distress. Eyes: Conjunctivae are normal. 
HEENT:  No LAD or thyromegaly Cardiovascular: Normal rate. regular rhythm. No murmurs No edema Pulmonary/Chest: Effort normal. clear to ausculation blaterally Musculoskeletal:  no edema. Abd: 
Neurological: Alert and oriented. Grossly intact cranial nerves and motor function. Skin: No rash noted. Psychiatric: Normal mood and affect. Behavior is normal.  
 
ASSESSMENT and PLAN Diagnoses and all orders for this visit: 
 
1. Chronic fatigue Agreed to give a B12 injection after labs today. Unclear etiology. Depression may play a role. Check labs. -     LIPID PANEL 
-     METABOLIC PANEL, COMPREHENSIVE 
-     CBC WITH AUTOMATED DIFF 
-     VITAMIN B12 & FOLATE 
-     VITAMIN D, 25 HYDROXY 
-     TSH 3RD GENERATION 
-     VITAMIN B12 INJECTION 
-     THER/PROPH/DIAG INJECTION, SUBCUT/IM 
-     cyanocobalamin (VITAMIN B-12) 1,000 mcg/mL injection; 1 mL by IntraMUSCular route once for 1 dose. 2. Benign essential HTN She is anxious. Declines any additional treatments at this time. 
-     LIPID PANEL 3. History of breast cancer 4. Vitamin D insufficiency 
-     VITAMIN D, 25 HYDROXY 5. Encounter for immunization 
-     INFLUENZA VIRUS VACCINE, HIGH DOSE SEASONAL, PRESERVATIVE FREE 
-     ADMIN INFLUENZA VIRUS VAC 6. Asymptomatic menopausal state -     DEXA BONE DENSITY STUDY AXIAL; Future 
 
 
 
lab results and schedule of future lab studies reviewed with patient 
reviewed medications and side effects in detail Return to clinic for further evaluation if new symptoms develop or if current symptoms worsen or fail to resolve. Discussed the patient's BMI with her. The BMI follow up plan is as follows:  
 
dietary management education, guidance, and counseling 
encourage exercise 
monitor weight 
prescribed dietary intake An After Visit Summary was printed and given to the patient.

## 2018-11-15 NOTE — ACP (ADVANCE CARE PLANNING)
Advance Care Planning (ACP) Provider Note - Comprehensive     Date of ACP Conversation: 11/15/18  Persons included in Conversation:  patient  Length of ACP Conversation in minutes:  <16 minutes (Non-Billable)    Authorized Decision Maker (if patient is incapable of making informed decisions): This person is:  Healthcare Agent/Medical Power of  under Advance Directive          General ACP for ALL Patients with Decision Making Capacity:   Importance of advance care planning, including choosing a healthcare agent to communicate patient's healthcare decisions if patient lost the ability to make decisions, such as after a sudden illness or accident  Understanding of the healthcare agent role was assessed and information provided  Exploration of values, goals, and preferences if recovery is not expected, even with continued medical treatment in the event of: Imminent death  Severe, permanent brain injury    Review of Existing Advance Directive:  not availble     For Serious or Chronic Illness:  Understanding of medical condition    Understanding of CPR, goals and expected outcomes, benefits and burdens discussed. Information on CPR success rates provided (e.g. for CPR in hospital, survival to d/c at two weeks is 22%, for chronically ill or elderly/frail survival is less than 3%); Individual asked to communicate understanding of information in his/her own words.   Explored fears and concerns regarding CPR or possible outcomes    Interventions Provided:  Recommended completion of Advance Directive form after review of ACP materials and conversation with prospective healthcare agent   Recommended communicating the plan and making copies for the healthcare agent, personal physician, and others as appropriate (e.g., health system)

## 2018-11-16 LAB
25(OH)D3+25(OH)D2 SERPL-MCNC: 19.3 NG/ML (ref 30–100)
ALBUMIN SERPL-MCNC: 4.5 G/DL (ref 3.5–4.8)
ALBUMIN/GLOB SERPL: 2.3 {RATIO} (ref 1.2–2.2)
ALP SERPL-CCNC: 99 IU/L (ref 39–117)
ALT SERPL-CCNC: 16 IU/L (ref 0–32)
AST SERPL-CCNC: 19 IU/L (ref 0–40)
BASOPHILS # BLD AUTO: 0 X10E3/UL (ref 0–0.2)
BASOPHILS NFR BLD AUTO: 0 %
BILIRUB SERPL-MCNC: <0.2 MG/DL (ref 0–1.2)
BUN SERPL-MCNC: 18 MG/DL (ref 8–27)
BUN/CREAT SERPL: 25 (ref 12–28)
CALCIUM SERPL-MCNC: 10.4 MG/DL (ref 8.7–10.3)
CHLORIDE SERPL-SCNC: 105 MMOL/L (ref 96–106)
CHOLEST SERPL-MCNC: 257 MG/DL (ref 100–199)
CO2 SERPL-SCNC: 26 MMOL/L (ref 20–29)
CREAT SERPL-MCNC: 0.71 MG/DL (ref 0.57–1)
EOSINOPHIL # BLD AUTO: 0.2 X10E3/UL (ref 0–0.4)
EOSINOPHIL NFR BLD AUTO: 3 %
ERYTHROCYTE [DISTWIDTH] IN BLOOD BY AUTOMATED COUNT: 14.5 % (ref 12.3–15.4)
FOLATE SERPL-MCNC: 12.2 NG/ML
GLOBULIN SER CALC-MCNC: 2 G/DL (ref 1.5–4.5)
GLUCOSE SERPL-MCNC: 92 MG/DL (ref 65–99)
HCT VFR BLD AUTO: 39 % (ref 34–46.6)
HDLC SERPL-MCNC: 79 MG/DL
HGB BLD-MCNC: 13.1 G/DL (ref 11.1–15.9)
IMM GRANULOCYTES # BLD: 0 X10E3/UL (ref 0–0.1)
IMM GRANULOCYTES NFR BLD: 0 %
INTERPRETATION, 910389: NORMAL
LDLC SERPL CALC-MCNC: 160 MG/DL (ref 0–99)
LYMPHOCYTES # BLD AUTO: 3 X10E3/UL (ref 0.7–3.1)
LYMPHOCYTES NFR BLD AUTO: 36 %
MCH RBC QN AUTO: 29.4 PG (ref 26.6–33)
MCHC RBC AUTO-ENTMCNC: 33.6 G/DL (ref 31.5–35.7)
MCV RBC AUTO: 88 FL (ref 79–97)
MONOCYTES # BLD AUTO: 0.6 X10E3/UL (ref 0.1–0.9)
MONOCYTES NFR BLD AUTO: 7 %
NEUTROPHILS # BLD AUTO: 4.5 X10E3/UL (ref 1.4–7)
NEUTROPHILS NFR BLD AUTO: 54 %
PLATELET # BLD AUTO: 291 X10E3/UL (ref 150–379)
POTASSIUM SERPL-SCNC: 4.6 MMOL/L (ref 3.5–5.2)
PROT SERPL-MCNC: 6.5 G/DL (ref 6–8.5)
RBC # BLD AUTO: 4.45 X10E6/UL (ref 3.77–5.28)
SODIUM SERPL-SCNC: 143 MMOL/L (ref 134–144)
TRIGL SERPL-MCNC: 89 MG/DL (ref 0–149)
TSH SERPL DL<=0.005 MIU/L-ACNC: 1.73 UIU/ML (ref 0.45–4.5)
VIT B12 SERPL-MCNC: 449 PG/ML (ref 232–1245)
VLDLC SERPL CALC-MCNC: 18 MG/DL (ref 5–40)
WBC # BLD AUTO: 8.3 X10E3/UL (ref 3.4–10.8)

## 2018-11-17 NOTE — PROGRESS NOTES
This is a Subsequent Medicare Annual Wellness Visit providing Personalized Prevention Plan Services (PPPS) (Performed 12 months after initial AWV and PPPS ) I have reviewed the patient's medical history in detail and updated the computerized patient record. History Past Medical History:  
Diagnosis Date  Benign essential HTN 10/5/2017  History of breast cancer 11/2004  
 right  - s/p lumpectomy w sentinal node 11/2004. s/p XRT 1 mo.  could not tolerate tamoxifen  IBS (irritable bowel syndrome)  Situational anxiety   
  with dementia  TMJ (temporomandibular joint syndrome)  UTI (urinary tract infection)   
 recurrent. last 9/2017 Past Surgical History:  
Procedure Laterality Date  HX CHOLECYSTECTOMY  HX COLONOSCOPY  2016  
 states benign polyp - repeat 2019  HX KNEE ARTHROSCOPY Right 04/11/2017  
 partial knee replacement in [place]  HX THYROIDECTOMY  2004  
 nodules, goiter Current Outpatient Medications Medication Sig  cholecalciferol (VITAMIN D3) 1,000 unit tablet Take  by mouth daily.  amoxicillin (AMOXIL) 500 mg capsule Take 4  Capsules one (1) hour prior to dental appointment No current facility-administered medications for this visit. No Known Allergies Family History Problem Relation Age of Onset  Stroke Mother  Heart Disease Father  Diabetes Father  Thyroid Cancer Brother   
 
 
 reports that  has never smoked. she has never used smokeless tobacco. 
 reports that she drinks about 1.2 oz of alcohol per week. Depression Risk Factor Screening:  
 
 
Alcohol Risk Factor Screening: On any occasion during the past 3 months, have you had more than 3 drinks containing alcohol? No 
 
Do you average more than 14 drinks per week? No 
 
 
Functional Ability and Level of Safety:  
 
Hearing Loss  
mild Activities of Daily Living Self-care. Requires assistance with: no ADLs Fall Risk Fall Risk Assessment, last 12 mths 4/19/2018 Able to walk? Yes Fall in past 12 months? No  
 
 
 
Abuse Screen Patient is not abused Review of Systems A comprehensive review of systems was negative except for that written in the HPI. Physical Examination Evaluation of Cognitive Function: 
Mood/affect:  neutral, happy Appearance: age appropriate Family member/caregiver input: none Blood pressure 159/82, pulse 68, temperature 97.6 °F (36.4 °C), temperature source Oral, resp. rate 16, height 5' 5\" (1.651 m), weight 186 lb 3.2 oz (84.5 kg), SpO2 95 %. General appearance: alert, cooperative, no distress, appears stated age Neck: supple, symmetrical, trachea midline, no adenopathy, thyroid: not enlarged, symmetric, no tenderness/mass/nodules, no carotid bruit and no JVD Lungs: clear to auscultation bilaterally Heart: regular rate and rhythm, S1, S2 normal, no murmur, click, rub or gallop Extremities: extremities normal, atraumatic, no cyanosis or edema Patient Care Team: 
Juanita Nielsen MD as PCP - General (Internal Medicine) Advice/Referrals/Counseling Education and counseling provided. See below for specific orders Assessment/Plan Diagnoses and all orders for this visit: 
 
1. Medicare annual wellness visit, subsequent 2. Chronic fatigue -     LIPID PANEL 
-     METABOLIC PANEL, COMPREHENSIVE 
-     CBC WITH AUTOMATED DIFF 
-     VITAMIN B12 & FOLATE 
-     VITAMIN D, 25 HYDROXY 
-     TSH 3RD GENERATION 
-     VITAMIN B12 INJECTION 
-     THER/PROPH/DIAG INJECTION, SUBCUT/IM 
-     cyanocobalamin (VITAMIN B-12) 1,000 mcg/mL injection; 1 mL by IntraMUSCular route once for 1 dose. 3. Benign essential HTN 
-     LIPID PANEL 4. History of breast cancer 5. Vitamin D insufficiency 
-     VITAMIN D, 25 HYDROXY 6. Encounter for immunization -     ADMIN INFLUENZA VIRUS VAC 
-     INFLUENZA VACCINE INACTIVATED (IIV), SUBUNIT, ADJUVANTED, IM 
 
 7. Asymptomatic menopausal state -     DEXA BONE DENSITY STUDY AXIAL; Future Other orders -     CVD REPORT Torie Pollock Potential medication side effects were discussed with the patient; let me know if any occur. Return for yearly Annual Wellness Visits

## 2018-11-18 RX ORDER — ASPIRIN 325 MG
50000 TABLET, DELAYED RELEASE (ENTERIC COATED) ORAL
Qty: 8 CAP | Refills: 1 | Status: SHIPPED | OUTPATIENT
Start: 2018-11-18 | End: 2019-02-01 | Stop reason: DRUGHIGH

## 2019-01-14 ENCOUNTER — HOSPITAL ENCOUNTER (OUTPATIENT)
Dept: MAMMOGRAPHY | Age: 79
Discharge: HOME | End: 2019-01-14
Attending: INTERNAL MEDICINE
Payer: MEDICARE

## 2019-01-14 DIAGNOSIS — Z78.0 ASYMPTOMATIC MENOPAUSAL STATE: ICD-10-CM

## 2019-01-14 PROCEDURE — 77080 DXA BONE DENSITY AXIAL: CPT

## 2019-01-30 ENCOUNTER — HOSPITAL ENCOUNTER (OUTPATIENT)
Dept: LAB | Age: 79
Discharge: HOME | End: 2019-01-30
Payer: MEDICARE

## 2019-01-30 ENCOUNTER — OFFICE VISIT (OUTPATIENT)
Dept: INTERNAL MEDICINE CLINIC | Age: 79
End: 2019-01-30

## 2019-01-30 VITALS
OXYGEN SATURATION: 96 % | WEIGHT: 187 LBS | TEMPERATURE: 98.3 F | SYSTOLIC BLOOD PRESSURE: 139 MMHG | BODY MASS INDEX: 31.16 KG/M2 | HEIGHT: 65 IN | DIASTOLIC BLOOD PRESSURE: 85 MMHG | HEART RATE: 70 BPM | RESPIRATION RATE: 16 BRPM

## 2019-01-30 DIAGNOSIS — R30.9 URINARY PAIN: Primary | ICD-10-CM

## 2019-01-30 DIAGNOSIS — N34.2 INFECTIVE URETHRITIS: ICD-10-CM

## 2019-01-30 DIAGNOSIS — B35.1 FUNGAL TOENAIL INFECTION: ICD-10-CM

## 2019-01-30 LAB
BILIRUB UR QL STRIP: NEGATIVE
GLUCOSE UR-MCNC: NEGATIVE MG/DL
KETONES P FAST UR STRIP-MCNC: NEGATIVE MG/DL
PH UR STRIP: 7 [PH] (ref 4.6–8)
PROT UR QL STRIP: NEGATIVE
SP GR UR STRIP: 1.01 (ref 1–1.03)
UA UROBILINOGEN AMB POC: NORMAL (ref 0.2–1)
URINALYSIS CLARITY POC: NORMAL
URINALYSIS COLOR POC: YELLOW
URINE BLOOD POC: NORMAL
URINE LEUKOCYTES POC: NORMAL
URINE NITRITES POC: NEGATIVE

## 2019-01-30 PROCEDURE — 87086 URINE CULTURE/COLONY COUNT: CPT

## 2019-01-30 RX ORDER — SULFAMETHOXAZOLE AND TRIMETHOPRIM 800; 160 MG/1; MG/1
1 TABLET ORAL 2 TIMES DAILY
Qty: 10 TAB | Refills: 0 | Status: SHIPPED | OUTPATIENT
Start: 2019-01-30 | End: 2019-06-15

## 2019-01-30 NOTE — PROGRESS NOTES
HISTORY OF PRESENT ILLNESS  Antoinette Esposito is a 69 y.o. female. HPI  Patient of Dr. Maddie Santillan, seen with concerns for UTI. She is having some urgency, frequency and lower abdominal pressure. Some nausea, no vomiting. No fevers, no flank pain. Also concerned about toenail infection and under stress because  has dementia. Review of Systems   Constitutional: Negative for chills and fever. Gastrointestinal: Negative for abdominal pain, nausea and vomiting. Genitourinary: Positive for frequency and urgency. Negative for dysuria, flank pain and hematuria. Physical Exam   Constitutional: She is oriented to person, place, and time. She appears well-developed and well-nourished. HENT:   Head: Normocephalic and atraumatic. Neck: Normal range of motion. Neck supple. Carotid bruit is not present. Cardiovascular: Normal rate, regular rhythm, S1 normal, S2 normal, normal heart sounds and intact distal pulses. No murmur heard. Pulmonary/Chest: Effort normal and breath sounds normal. No respiratory distress. Abdominal: Soft. Bowel sounds are normal. She exhibits no mass. There is no tenderness. No cvat   Musculoskeletal: She exhibits no edema. Neurological: She is alert and oriented to person, place, and time. Psychiatric: She has a normal mood and affect. Her behavior is normal.   Nursing note and vitals reviewed. ASSESSMENT and PLAN  Diagnoses and all orders for this visit:    1. Urinary pain  -     AMB POC URINALYSIS DIP STICK MANUAL W/O MICRO  -     CULTURE, URINE    2. Infective urethritis  -     trimethoprim-sulfamethoxazole (BACTRIM DS, SEPTRA DS) 160-800 mg per tablet; Take 1 Tab by mouth two (2) times a day.     3. Fungal toenail infection  -     REFERRAL TO PODIATRY    Encouraged appt with dr alejandro davis ongoing stressors with  and his dementia

## 2019-01-31 LAB — BACTERIA UR CULT: NORMAL

## 2019-02-01 ENCOUNTER — PATIENT MESSAGE (OUTPATIENT)
Dept: INTERNAL MEDICINE CLINIC | Age: 79
End: 2019-02-01

## 2019-02-01 RX ORDER — ASPIRIN 325 MG
50000 TABLET, DELAYED RELEASE (ENTERIC COATED) ORAL
Qty: 8 CAP | Refills: 1
Start: 2019-02-01 | End: 2019-03-23

## 2019-02-01 NOTE — TELEPHONE ENCOUNTER
From: Lizzeth Deluca  To: Diamond Gonzales MD  Sent: 2/1/2019 12:12 PM EST  Subject: Referral Request    Please refer me to a gynecologist/urologist. My issues of blotting and holding in urine- not emptying bladder also to check on possible discharge from yeast or other. Yogurt may have caused blotting as I'm lactose intolerant. Thank you for the podiatrist referral and address my concerns.

## 2019-03-17 RX ORDER — ASPIRIN 325 MG
TABLET, DELAYED RELEASE (ENTERIC COATED) ORAL
Qty: 8 CAP | Refills: 1 | Status: SHIPPED | OUTPATIENT
Start: 2019-03-17 | End: 2019-06-15

## 2019-06-15 ENCOUNTER — APPOINTMENT (OUTPATIENT)
Dept: GENERAL RADIOLOGY | Age: 79
DRG: 482 | End: 2019-06-15
Attending: EMERGENCY MEDICINE
Payer: MEDICARE

## 2019-06-15 ENCOUNTER — ANESTHESIA EVENT (OUTPATIENT)
Dept: SURGERY | Age: 79
DRG: 482 | End: 2019-06-15
Payer: MEDICARE

## 2019-06-15 ENCOUNTER — APPOINTMENT (OUTPATIENT)
Dept: CT IMAGING | Age: 79
DRG: 482 | End: 2019-06-15
Attending: PHYSICIAN ASSISTANT
Payer: MEDICARE

## 2019-06-15 ENCOUNTER — HOSPITAL ENCOUNTER (EMERGENCY)
Dept: GENERAL RADIOLOGY | Age: 79
Discharge: HOME | DRG: 482 | End: 2019-06-15
Attending: EMERGENCY MEDICINE
Payer: MEDICARE

## 2019-06-15 ENCOUNTER — HOSPITAL ENCOUNTER (INPATIENT)
Age: 79
LOS: 3 days | Discharge: NURSING, REHAB OR LONG-TERM CARE | DRG: 482 | End: 2019-06-18
Attending: EMERGENCY MEDICINE | Admitting: INTERNAL MEDICINE
Payer: MEDICARE

## 2019-06-15 DIAGNOSIS — S72.002A LEFT DISPLACED FEMORAL NECK FRACTURE (HCC): Primary | ICD-10-CM

## 2019-06-15 DIAGNOSIS — S72.009A CLOSED FRACTURE OF HIP, UNSPECIFIED LATERALITY, INITIAL ENCOUNTER (HCC): ICD-10-CM

## 2019-06-15 PROBLEM — D72.829 LEUKOCYTOSIS: Status: ACTIVE | Noted: 2019-06-15

## 2019-06-15 LAB
ANION GAP SERPL CALC-SCNC: 2 MMOL/L (ref 5–15)
BASOPHILS # BLD: 0 K/UL (ref 0–0.1)
BASOPHILS NFR BLD: 0 % (ref 0–1)
BUN SERPL-MCNC: 19 MG/DL (ref 6–20)
BUN/CREAT SERPL: 24 (ref 12–20)
CALCIUM SERPL-MCNC: 9.8 MG/DL (ref 8.5–10.1)
CHLORIDE SERPL-SCNC: 107 MMOL/L (ref 97–108)
CO2 SERPL-SCNC: 28 MMOL/L (ref 21–32)
COMMENT, HOLDF: NORMAL
CREAT SERPL-MCNC: 0.79 MG/DL (ref 0.55–1.02)
DIFFERENTIAL METHOD BLD: ABNORMAL
EOSINOPHIL # BLD: 0.1 K/UL (ref 0–0.4)
EOSINOPHIL NFR BLD: 0 % (ref 0–7)
ERYTHROCYTE [DISTWIDTH] IN BLOOD BY AUTOMATED COUNT: 12.8 % (ref 11.5–14.5)
GLUCOSE SERPL-MCNC: 111 MG/DL (ref 65–100)
HCT VFR BLD AUTO: 41.2 % (ref 35–47)
HGB BLD-MCNC: 13.5 G/DL (ref 11.5–16)
IMM GRANULOCYTES # BLD AUTO: 0.1 K/UL (ref 0–0.04)
IMM GRANULOCYTES NFR BLD AUTO: 0 % (ref 0–0.5)
LYMPHOCYTES # BLD: 1.5 K/UL (ref 0.8–3.5)
LYMPHOCYTES NFR BLD: 11 % (ref 12–49)
MCH RBC QN AUTO: 29.6 PG (ref 26–34)
MCHC RBC AUTO-ENTMCNC: 32.8 G/DL (ref 30–36.5)
MCV RBC AUTO: 90.4 FL (ref 80–99)
MONOCYTES # BLD: 0.7 K/UL (ref 0–1)
MONOCYTES NFR BLD: 5 % (ref 5–13)
NEUTS SEG # BLD: 10.9 K/UL (ref 1.8–8)
NEUTS SEG NFR BLD: 84 % (ref 32–75)
NRBC # BLD: 0 K/UL (ref 0–0.01)
NRBC BLD-RTO: 0 PER 100 WBC
PLATELET # BLD AUTO: 258 K/UL (ref 150–400)
PMV BLD AUTO: 9.2 FL (ref 8.9–12.9)
POTASSIUM SERPL-SCNC: 4.2 MMOL/L (ref 3.5–5.1)
RBC # BLD AUTO: 4.56 M/UL (ref 3.8–5.2)
SAMPLES BEING HELD,HOLD: NORMAL
SODIUM SERPL-SCNC: 137 MMOL/L (ref 136–145)
WBC # BLD AUTO: 13.1 K/UL (ref 3.6–11)

## 2019-06-15 PROCEDURE — 99285 EMERGENCY DEPT VISIT HI MDM: CPT

## 2019-06-15 PROCEDURE — 74011250636 HC RX REV CODE- 250/636: Performed by: PHYSICIAN ASSISTANT

## 2019-06-15 PROCEDURE — 93005 ELECTROCARDIOGRAM TRACING: CPT

## 2019-06-15 PROCEDURE — 85025 COMPLETE CBC W/AUTO DIFF WBC: CPT

## 2019-06-15 PROCEDURE — 74011250636 HC RX REV CODE- 250/636: Performed by: EMERGENCY MEDICINE

## 2019-06-15 PROCEDURE — 74011250636 HC RX REV CODE- 250/636: Performed by: INTERNAL MEDICINE

## 2019-06-15 PROCEDURE — 86900 BLOOD TYPING SEROLOGIC ABO: CPT

## 2019-06-15 PROCEDURE — 73700 CT LOWER EXTREMITY W/O DYE: CPT

## 2019-06-15 PROCEDURE — 96375 TX/PRO/DX INJ NEW DRUG ADDON: CPT

## 2019-06-15 PROCEDURE — 73502 X-RAY EXAM HIP UNI 2-3 VIEWS: CPT

## 2019-06-15 PROCEDURE — 96374 THER/PROPH/DIAG INJ IV PUSH: CPT

## 2019-06-15 PROCEDURE — 77030038269 HC DRN EXT URIN PURWCK BARD -A

## 2019-06-15 PROCEDURE — 96376 TX/PRO/DX INJ SAME DRUG ADON: CPT

## 2019-06-15 PROCEDURE — 80048 BASIC METABOLIC PNL TOTAL CA: CPT

## 2019-06-15 PROCEDURE — 36415 COLL VENOUS BLD VENIPUNCTURE: CPT

## 2019-06-15 PROCEDURE — 73501 X-RAY EXAM HIP UNI 1 VIEW: CPT

## 2019-06-15 PROCEDURE — 65270000029 HC RM PRIVATE

## 2019-06-15 PROCEDURE — 73552 X-RAY EXAM OF FEMUR 2/>: CPT

## 2019-06-15 PROCEDURE — 71045 X-RAY EXAM CHEST 1 VIEW: CPT

## 2019-06-15 RX ORDER — FENTANYL CITRATE 50 UG/ML
50 INJECTION, SOLUTION INTRAMUSCULAR; INTRAVENOUS
Status: COMPLETED | OUTPATIENT
Start: 2019-06-15 | End: 2019-06-15

## 2019-06-15 RX ORDER — SODIUM CHLORIDE 0.9 % (FLUSH) 0.9 %
5-40 SYRINGE (ML) INJECTION AS NEEDED
Status: DISCONTINUED | OUTPATIENT
Start: 2019-06-15 | End: 2019-06-18 | Stop reason: HOSPADM

## 2019-06-15 RX ORDER — HEPARIN SODIUM 5000 [USP'U]/ML
5000 INJECTION, SOLUTION INTRAVENOUS; SUBCUTANEOUS ONCE
Status: COMPLETED | OUTPATIENT
Start: 2019-06-15 | End: 2019-06-15

## 2019-06-15 RX ORDER — MORPHINE SULFATE 4 MG/ML
2 INJECTION INTRAVENOUS
Status: DISCONTINUED | OUTPATIENT
Start: 2019-06-15 | End: 2019-06-18 | Stop reason: HOSPADM

## 2019-06-15 RX ORDER — SODIUM CHLORIDE 0.9 % (FLUSH) 0.9 %
5-40 SYRINGE (ML) INJECTION EVERY 8 HOURS
Status: DISCONTINUED | OUTPATIENT
Start: 2019-06-15 | End: 2019-06-17 | Stop reason: SDUPTHER

## 2019-06-15 RX ORDER — ONDANSETRON 2 MG/ML
4 INJECTION INTRAMUSCULAR; INTRAVENOUS
Status: COMPLETED | OUTPATIENT
Start: 2019-06-15 | End: 2019-06-15

## 2019-06-15 RX ADMIN — HEPARIN SODIUM 5000 UNITS: 5000 INJECTION INTRAVENOUS; SUBCUTANEOUS at 17:21

## 2019-06-15 RX ADMIN — MORPHINE SULFATE 2 MG: 4 INJECTION, SOLUTION INTRAMUSCULAR; INTRAVENOUS at 20:57

## 2019-06-15 RX ADMIN — FENTANYL CITRATE 50 MCG: 50 INJECTION, SOLUTION INTRAMUSCULAR; INTRAVENOUS at 15:40

## 2019-06-15 RX ADMIN — Medication 10 ML: at 22:00

## 2019-06-15 RX ADMIN — MORPHINE SULFATE 2 MG: 4 INJECTION, SOLUTION INTRAMUSCULAR; INTRAVENOUS at 17:20

## 2019-06-15 RX ADMIN — ONDANSETRON 4 MG: 2 INJECTION INTRAMUSCULAR; INTRAVENOUS at 16:35

## 2019-06-15 RX ADMIN — FENTANYL CITRATE 50 MCG: 50 INJECTION, SOLUTION INTRAMUSCULAR; INTRAVENOUS at 16:40

## 2019-06-15 RX ADMIN — ONDANSETRON 4 MG: 2 INJECTION INTRAMUSCULAR; INTRAVENOUS at 15:40

## 2019-06-15 RX ADMIN — FENTANYL CITRATE 50 MCG: 50 INJECTION, SOLUTION INTRAMUSCULAR; INTRAVENOUS at 19:00

## 2019-06-15 NOTE — ED TRIAGE NOTES
Pt arrives via [employer]. Pt states \"I fell on my deck and landed on my left hip. I was there for 2 hrs until my family member came and called the ambulance. \"

## 2019-06-15 NOTE — H&P
[address], [facility] 19  [phone]    Admission History and Physical      NAME:  Leonarda Nunes   :   1940   MRN:  [id number]     PCP:  Leobardo Borrero MD     Date/Time:  6/15/2019         Subjective:     CHIEF COMPLAINT: LT hip pain after a fall      HISTORY OF PRESENT ILLNESS:     Ms. Lakeisha Morris is a 69 y.o.  female who is admitted with LT femur fracture. Ms. Lakeisha Morris with PMH of HTN, breast cancer, IBS presented to ER c/o LT hip pain after a fall. Pt walking and she tripped landing on her LT side. She started to have severe LT hip pian, which is sharp, and constant. The pain got worse with slight moevment of the leg. Past Medical History:   Diagnosis Date    Benign essential HTN 10/5/2017    Cancer [facility])     breast    History of breast cancer 2004    right  - s/p lumpectomy w sentinal node 2004. s/p XRT 1 mo.  could not tolerate tamoxifen    IBS (irritable bowel syndrome)     Ill-defined condition     left sciatica    Situational anxiety      with dementia    TMJ (temporomandibular joint syndrome)     UTI (urinary tract infection)     recurrent. last 2017        Past Surgical History:   Procedure Laterality Date    HX CHOLECYSTECTOMY      HX COLONOSCOPY  2016    states benign polyp - repeat 2019    HX KNEE ARTHROSCOPY Right 2017    partial knee replacement in [place]    HX ORTHOPAEDIC      bilat knee replacement    HX THYROIDECTOMY  2004    nodules, goiter       Social History     Tobacco Use    Smoking status: Never Smoker    Smokeless tobacco: Never Used   Substance Use Topics    Alcohol use:  Yes     Alcohol/week: 1.2 oz     Types: 2 Glasses of wine per week        Family History   Problem Relation Age of Onset    Stroke Mother     Heart Disease Father     Diabetes Father     Thyroid Cancer Brother         No Known Allergies     Prior to Admission medications    Medication Sig Start Date End Date Taking? Authorizing Provider   cholecalciferol (VITAMIN D3) 50,000 unit capsule TAKE 1 CAP BY MOUTH EVERY SEVEN (7) DAYS FOR 8 DOSES. FOR 8 [facility] 3/17/19   Port, Harry Litten, MD   trimethoprim-sulfamethoxazole (BACTRIM DS, SEPTRA DS) 160-800 mg per tablet Take 1 Tab by mouth two (2) times a day. 1/30/19   Jose M Sánchez MD   cholecalciferol (VITAMIN D3) 1,000 unit tablet Take  by mouth daily.     Provider, Historical         Review of Systems:  (bold if positive, if negative)    Gen:  Eyes:  ENT:  CVS:  Pulm:  GI:    :    MS:  Pain,Skin:  Psych:  Endo:    Hem:  Renal:    Neuro:            Objective:      VITALS:    Vital signs reviewed; most recent are:    Visit Vitals  /59 (BP 1 Location: Left arm, BP Patient Position: Sitting)   Pulse 64   Temp 98.2 °F (36.8 °C)   Resp 8   Wt 86.2 kg (190 lb)   SpO2 96%   BMI 31.62 kg/m²     SpO2 Readings from Last 6 Encounters:   06/15/19 96%   01/30/19 96%   11/15/18 95%   04/19/18 95%   11/13/17 98%        No intake or output data in the 24 hours ending 06/15/19 1702         Exam:     Physical Exam:    Gen:  Well-developed, well-nourished, in no acute distress  HEENT:  Pink conjunctivae, PERRL, hearing intact to voice, moist mucous membranes  Neck:  Supple, without masses, thyroid non-tender  Resp:  No accessory muscle use, clear breath sounds without wheezes rales or rhonchi  Card:  No murmurs, normal S1, S2 without thrills, bruits or peripheral edema  Abd:  Soft, non-tender, non-distended, normoactive bowel sounds are present, no palpable organomegaly and no detectable hernias  Lymph:  No cervical or inguinal adenopathy  Musc:  No cyanosis or clubbing  Skin:  No rashes or ulcers, skin turgor is good  Neuro:  Cranial nerves are grossly intact, no focal motor weakness, follows commands appropriately  Psych:  Good insight, oriented to person, place and time, alert       Labs:    Recent Labs     06/15/19  1633   WBC 13.1*   HGB 13.5   HCT 41.2        No results for input(s): NA, K, CL, CO2, GLU, BUN, CREA, CA, MG, PHOS, ALB, TBIL, TBILI, SGOT, ALT in the last 72 hours. Lab Results   Component Value Date/Time    Glucose (POC) 109 (H) 11/13/2017 03:15 PM     No results for input(s): PH, PCO2, PO2, HCO3, FIO2 in the last 72 hours. No results for input(s): INR in the last 72 hours. No lab exists for component: INREXT    Telemetry reviewed:   normal sinus rhythm       Assessment/Plan:    1. LT hip fracture ([address] (6/15/2019) after GLF. Admit to orthopedics floor. Pain management and plan per ortho. 2.  Regarding medical clearance. This is an intermediate risk surgery in a pt without Hx of cardiac disease. Her EKG is nonischemic. Denies SOB or chest pain. Has > 4 METs daily. Pt doesn't need further cardiac workup. 3.  Benign essential HTN (10/5/2017). Not on medication. Monitor     4. IBS (irritable bowel syndrome). Symptomatic treatment     5. Leukocytosis (6/15/2019). Likely reactive.  Monitor          Previous medical records reviewed     Risk of deterioration: medium      Total time spent with patient: 48 [address] discussed with: Patient, Family, Nursing Staff and >50% of time spent in counseling and coordination of care    Discussed:  Care Plan    Prophylaxis:  Hep SQ    Probable Disposition:  Home w/Family           ___________________________________________________    Attending Physician: Yuriy Robertson MD

## 2019-06-15 NOTE — ED PROVIDER NOTES
69 y.o. female with past medical history significant for situational anxiety, IBS, breast cancer, left sided sciatica, and HTN who presents from EMS with chief complaint of hip pain. Pt reports a ground level fall on her deck PTA today. Pt notes she was on the ground for 2 hours until a family member arrived and called EMS. Pt c/o left hip pain and left leg swelling since the fall. Pt reports swimming as a method of relief for her sciatica. Pt also notes recent tooth loss. There are no other acute medical concerns at this time. PCP: Karla Roberto MD    Note written by Breana Jose, as dictated by Shahana Dunn MD 3:45 PM    .           Past Medical History:   Diagnosis Date    Benign essential HTN 10/5/2017    Cancer [facility])     breast    History of breast cancer 11/2004    right  - s/p lumpectomy w sentinal node 11/2004. s/p XRT 1 mo.  could not tolerate tamoxifen    IBS (irritable bowel syndrome)     Ill-defined condition     left sciatica    Situational anxiety      with dementia    TMJ (temporomandibular joint syndrome)     UTI (urinary tract infection)     recurrent.   last 9/2017       Past Surgical History:   Procedure Laterality Date    HX CHOLECYSTECTOMY      HX COLONOSCOPY  2016    states benign polyp - repeat 2019    HX KNEE ARTHROSCOPY Right 04/11/2017    partial knee replacement in [place]    HX ORTHOPAEDIC      bilat knee replacement    HX THYROIDECTOMY  2004    nodules, goiter         Family History:   Problem Relation Age of Onset    Stroke Mother     Heart Disease Father     Diabetes Father     Thyroid Cancer Brother        Social History     Socioeconomic History    Marital status:      Spouse name: Not on file    Number of children: Not on file    Years of education: Not on file    Highest education level: Not on file   Occupational History    Not on file   Social Needs    Financial resource strain: Not on file    Food insecurity:     Worry: Not on file     Inability: Not on file    Transportation needs:     Medical: Not on file     Non-medical: Not on file   Tobacco Use    Smoking status: Never Smoker    Smokeless tobacco: Never Used   Substance and Sexual Activity    Alcohol use: Yes     Alcohol/week: 1.2 oz     Types: 2 Glasses of wine per week    Drug use: No    Sexual activity: Never   Lifestyle    Physical activity:     Days per week: Not on file     Minutes per session: Not on file    Stress: Not on file   Relationships    Social connections:     Talks on phone: Not on file     Gets together: Not on file     Attends Orthodoxy service: Not on file     Active member of club or organization: Not on file     Attends meetings of clubs or organizations: Not on file     Relationship status: Not on file    Intimate partner violence:     Fear of current or ex partner: Not on file     Emotionally abused: Not on file     Physically abused: Not on file     Forced sexual activity: Not on file   Other Topics Concern    Not on file   Social History Narrative    Not on file         ALLERGIES: Patient has no known allergies. Review of Systems   Musculoskeletal: Positive for arthralgias. All other systems reviewed and are negative. Vitals:    06/15/19 1617 06/15/19 1630 06/15/19 1730 06/15/19 1745   BP: 145/59 156/69 147/57 139/53   Pulse: 64 69 82 83   Resp: 8 10 24 19   Temp: 98.2 °F (36.8 °C)      SpO2: 96% 100% 91% 94%   Weight: 86.2 kg (190 lb)               Physical Exam   Constitutional: She appears well-developed and well-nourished. No distress. HENT:   Head: Normocephalic and atraumatic. Eyes: Conjunctivae are normal.   Neck: Neck supple. Cardiovascular: Normal rate and regular rhythm. Normal heart sounds. Pulmonary/Chest: Effort normal and breath sounds normal. No respiratory distress. Normal lung sounds. Abdominal: She exhibits no distension. Musculoskeletal: Normal range of motion. She exhibits no deformity. Left hip: She exhibits tenderness. Left hip tenderness and swelling over hip with ROM limited secondary to pain. Exquisite pain with internal rotation. Neurological: She is alert. No cranial nerve deficit. Skin: Skin is warm and dry. Psychiatric: Her behavior is normal.   Nursing note and vitals reviewed. Note written by Breana Marc, as dictated by Stella Kumar MD 3:45 PM       MDM     69 y.o. female presents after fall from standing onto left hip, exam concerning for fracture with point tenderness over hip and pain with minimal rotation to joint. HDS, no other signs of significant trauma. Orthopedics consulted, admit to medicine pending surgical management. Procedures    4:51 PM  Consult called to Dr Zaynab Pierre, consult for the hospitalist service. I reviewed available results and clinical information. They will admit the patient.

## 2019-06-15 NOTE — PROGRESS NOTES
Seen and evaluated. Plan for CT for operative planning. Plan for OR tomorrow. Spoke with patient and family concerning plan of care and surgical decision making. NPO after midnight. Formal consult to follow.         Kayleigh Hussein PA-C  [facility]  [address] [place] [address]

## 2019-06-15 NOTE — ADVANCED PRACTICE NURSE
Bedside and Verbal shift change report given to OMAR Torres (oncoming nurse) by Justus NELSON (offgoing nurse).  Report included the following information SBAR, Kardex, ED Summary, Intake/Output, MAR and Recent Results.

## 2019-06-15 NOTE — PROGRESS NOTES
[facility]: MED RECONCILIATION    Comments/Recommendations: The patient reports taking no medications at home. She was prescribed vitamin d 50,000 units to be taken once weekly but she stopped it 10 days ago due to nausea. Addendum 6/15/19 8041 - based on interview updated allergies from NKA to lidocaine with reaction of throat swelling. Allergies: Patient has no known allergies.     Prior to Admission Medications:     None        Thank you,      Duane Llano, PharmD, BCPS

## 2019-06-15 NOTE — ROUTINE PROCESS
TRANSFER - OUT REPORT: 
 
Verbal report given to Gonzales Torres RN(name) on Keny Fan  being transferred to [facility](unit) for routine progression of care Report consisted of patients Situation, Background, Assessment and  
Recommendations(SBAR). Information from the following report(s) SBAR, MAR, vitals, all test results, pain was reviewed with the receiving nurse. Lines:  
Peripheral IV 06/15/19 Left Antecubital (Active) Site Assessment Clean, dry, & intact 6/15/2019  3:16 PM  
Phlebitis Assessment 0 6/15/2019  3:16 PM  
Infiltration Assessment 0 6/15/2019  3:16 PM  
Dressing Status Clean, dry, & intact 6/15/2019  3:16 PM  
Dressing Type Transparent 6/15/2019  3:16 PM  
Hub Color/Line Status Pink 6/15/2019  3:16 PM  
  
 
Opportunity for questions and clarification was provided. Patient transported with: RN x2 monitored. Reassessment at this time is unchanged pt is stable for transfer to room.

## 2019-06-15 NOTE — ED NOTES
Pt returned from CT complaining of increased hip pain after movement onto CT Table. Pt medicated with Morphine as ordered.

## 2019-06-16 ENCOUNTER — ANESTHESIA (OUTPATIENT)
Dept: SURGERY | Age: 79
DRG: 482 | End: 2019-06-16
Payer: MEDICARE

## 2019-06-16 ENCOUNTER — APPOINTMENT (OUTPATIENT)
Dept: GENERAL RADIOLOGY | Age: 79
DRG: 482 | End: 2019-06-16
Attending: ORTHOPAEDIC SURGERY
Payer: MEDICARE

## 2019-06-16 LAB
ABO + RH BLD: NORMAL
ANION GAP SERPL CALC-SCNC: 4 MMOL/L (ref 5–15)
ATRIAL RATE: 63 BPM
BLOOD GROUP ANTIBODIES SERPL: NORMAL
BUN SERPL-MCNC: 22 MG/DL (ref 6–20)
BUN/CREAT SERPL: 28 (ref 12–20)
CALCIUM SERPL-MCNC: 9.8 MG/DL (ref 8.5–10.1)
CALCULATED P AXIS, ECG09: 102 DEGREES
CALCULATED R AXIS, ECG10: 12 DEGREES
CALCULATED T AXIS, ECG11: 52 DEGREES
CHLORIDE SERPL-SCNC: 104 MMOL/L (ref 97–108)
CO2 SERPL-SCNC: 29 MMOL/L (ref 21–32)
CREAT SERPL-MCNC: 0.79 MG/DL (ref 0.55–1.02)
DIAGNOSIS, 93000: NORMAL
ERYTHROCYTE [DISTWIDTH] IN BLOOD BY AUTOMATED COUNT: 13.1 % (ref 11.5–14.5)
GLUCOSE SERPL-MCNC: 117 MG/DL (ref 65–100)
HCT VFR BLD AUTO: 41.8 % (ref 35–47)
HGB BLD-MCNC: 13.1 G/DL (ref 11.5–16)
MCH RBC QN AUTO: 29.2 PG (ref 26–34)
MCHC RBC AUTO-ENTMCNC: 31.3 G/DL (ref 30–36.5)
MCV RBC AUTO: 93.1 FL (ref 80–99)
NRBC # BLD: 0 K/UL (ref 0–0.01)
NRBC BLD-RTO: 0 PER 100 WBC
P-R INTERVAL, ECG05: 124 MS
PLATELET # BLD AUTO: 235 K/UL (ref 150–400)
PMV BLD AUTO: 9.5 FL (ref 8.9–12.9)
POTASSIUM SERPL-SCNC: 4.3 MMOL/L (ref 3.5–5.1)
Q-T INTERVAL, ECG07: 414 MS
QRS DURATION, ECG06: 78 MS
QTC CALCULATION (BEZET), ECG08: 423 MS
RBC # BLD AUTO: 4.49 M/UL (ref 3.8–5.2)
SODIUM SERPL-SCNC: 137 MMOL/L (ref 136–145)
SPECIMEN EXP DATE BLD: NORMAL
VENTRICULAR RATE, ECG03: 63 BPM
WBC # BLD AUTO: 9.4 K/UL (ref 3.6–11)

## 2019-06-16 PROCEDURE — 74011250636 HC RX REV CODE- 250/636: Performed by: ORTHOPAEDIC SURGERY

## 2019-06-16 PROCEDURE — 74011250636 HC RX REV CODE- 250/636

## 2019-06-16 PROCEDURE — 77030039266 HC ADH SKN EXOFIN S2SG -A: Performed by: ORTHOPAEDIC SURGERY

## 2019-06-16 PROCEDURE — C1713 ANCHOR/SCREW BN/BN,TIS/BN: HCPCS | Performed by: ORTHOPAEDIC SURGERY

## 2019-06-16 PROCEDURE — 74011250636 HC RX REV CODE- 250/636: Performed by: INTERNAL MEDICINE

## 2019-06-16 PROCEDURE — 94760 N-INVAS EAR/PLS OXIMETRY 1: CPT

## 2019-06-16 PROCEDURE — 85027 COMPLETE CBC AUTOMATED: CPT

## 2019-06-16 PROCEDURE — 74011250637 HC RX REV CODE- 250/637: Performed by: ORTHOPAEDIC SURGERY

## 2019-06-16 PROCEDURE — 77010033678 HC OXYGEN DAILY

## 2019-06-16 PROCEDURE — 76060000033 HC ANESTHESIA 1 TO 1.5 HR: Performed by: ORTHOPAEDIC SURGERY

## 2019-06-16 PROCEDURE — 77030011640 HC PAD GRND REM COVD -A: Performed by: ORTHOPAEDIC SURGERY

## 2019-06-16 PROCEDURE — C1769 GUIDE WIRE: HCPCS | Performed by: ORTHOPAEDIC SURGERY

## 2019-06-16 PROCEDURE — 77030007866 HC KT SPN ANES BBMI -B: Performed by: ANESTHESIOLOGY

## 2019-06-16 PROCEDURE — 77030034850: Performed by: ORTHOPAEDIC SURGERY

## 2019-06-16 PROCEDURE — 77030020788: Performed by: ORTHOPAEDIC SURGERY

## 2019-06-16 PROCEDURE — 77030031139 HC SUT VCRL2 J&J -A: Performed by: ORTHOPAEDIC SURGERY

## 2019-06-16 PROCEDURE — 80048 BASIC METABOLIC PNL TOTAL CA: CPT

## 2019-06-16 PROCEDURE — 76210000016 HC OR PH I REC 1 TO 1.5 HR: Performed by: ORTHOPAEDIC SURGERY

## 2019-06-16 PROCEDURE — 77030008467 HC STPLR SKN COVD -B: Performed by: ORTHOPAEDIC SURGERY

## 2019-06-16 PROCEDURE — 76010000149 HC OR TIME 1 TO 1.5 HR: Performed by: ORTHOPAEDIC SURGERY

## 2019-06-16 PROCEDURE — 36415 COLL VENOUS BLD VENIPUNCTURE: CPT

## 2019-06-16 PROCEDURE — 74011000250 HC RX REV CODE- 250

## 2019-06-16 PROCEDURE — 0QH734Z INSERTION OF INTERNAL FIXATION DEVICE INTO LEFT UPPER FEMUR, PERCUTANEOUS APPROACH: ICD-10-PCS | Performed by: ORTHOPAEDIC SURGERY

## 2019-06-16 PROCEDURE — 65270000029 HC RM PRIVATE

## 2019-06-16 PROCEDURE — 77030003785 HC BIT DRL DISP STRY -E: Performed by: ORTHOPAEDIC SURGERY

## 2019-06-16 PROCEDURE — 77030018836 HC SOL IRR NACL ICUM -A: Performed by: ORTHOPAEDIC SURGERY

## 2019-06-16 DEVICE — CANNULATED SCREW
Type: IMPLANTABLE DEVICE | Site: HIP | Status: FUNCTIONAL
Brand: ASNIS

## 2019-06-16 RX ORDER — OXYCODONE HYDROCHLORIDE 5 MG/1
5 TABLET ORAL
Status: DISCONTINUED | OUTPATIENT
Start: 2019-06-16 | End: 2019-06-18 | Stop reason: HOSPADM

## 2019-06-16 RX ORDER — ONDANSETRON 2 MG/ML
4 INJECTION INTRAMUSCULAR; INTRAVENOUS AS NEEDED
Status: DISCONTINUED | OUTPATIENT
Start: 2019-06-16 | End: 2019-06-16 | Stop reason: HOSPADM

## 2019-06-16 RX ORDER — SODIUM CHLORIDE, SODIUM LACTATE, POTASSIUM CHLORIDE, CALCIUM CHLORIDE 600; 310; 30; 20 MG/100ML; MG/100ML; MG/100ML; MG/100ML
125 INJECTION, SOLUTION INTRAVENOUS CONTINUOUS
Status: DISCONTINUED | OUTPATIENT
Start: 2019-06-16 | End: 2019-06-16 | Stop reason: HOSPADM

## 2019-06-16 RX ORDER — ACETAMINOPHEN 325 MG/1
650 TABLET ORAL EVERY 6 HOURS
Status: DISCONTINUED | OUTPATIENT
Start: 2019-06-16 | End: 2019-06-18 | Stop reason: HOSPADM

## 2019-06-16 RX ORDER — AMOXICILLIN 250 MG
1 CAPSULE ORAL 2 TIMES DAILY
Status: DISCONTINUED | OUTPATIENT
Start: 2019-06-16 | End: 2019-06-18 | Stop reason: HOSPADM

## 2019-06-16 RX ORDER — OXYCODONE HYDROCHLORIDE 5 MG/1
2.5 TABLET ORAL
Status: DISCONTINUED | OUTPATIENT
Start: 2019-06-16 | End: 2019-06-18 | Stop reason: HOSPADM

## 2019-06-16 RX ORDER — CEFAZOLIN SODIUM/WATER 2 G/20 ML
2 SYRINGE (ML) INTRAVENOUS ONCE
Status: COMPLETED | OUTPATIENT
Start: 2019-06-16 | End: 2019-06-16

## 2019-06-16 RX ORDER — FERROUS SULFATE, DRIED 160(50) MG
1 TABLET, EXTENDED RELEASE ORAL
Status: DISCONTINUED | OUTPATIENT
Start: 2019-06-17 | End: 2019-06-18 | Stop reason: HOSPADM

## 2019-06-16 RX ORDER — HYDROMORPHONE HYDROCHLORIDE 1 MG/ML
.5-1 INJECTION, SOLUTION INTRAMUSCULAR; INTRAVENOUS; SUBCUTANEOUS
Status: DISCONTINUED | OUTPATIENT
Start: 2019-06-16 | End: 2019-06-16 | Stop reason: HOSPADM

## 2019-06-16 RX ORDER — HYDROMORPHONE HYDROCHLORIDE 1 MG/ML
0.5 INJECTION, SOLUTION INTRAMUSCULAR; INTRAVENOUS; SUBCUTANEOUS
Status: DISPENSED | OUTPATIENT
Start: 2019-06-16 | End: 2019-06-17

## 2019-06-16 RX ORDER — FENTANYL CITRATE 50 UG/ML
INJECTION, SOLUTION INTRAMUSCULAR; INTRAVENOUS AS NEEDED
Status: DISCONTINUED | OUTPATIENT
Start: 2019-06-16 | End: 2019-06-16 | Stop reason: HOSPADM

## 2019-06-16 RX ORDER — BUPIVACAINE HYDROCHLORIDE 7.5 MG/ML
INJECTION, SOLUTION EPIDURAL; RETROBULBAR AS NEEDED
Status: DISCONTINUED | OUTPATIENT
Start: 2019-06-16 | End: 2019-06-16 | Stop reason: HOSPADM

## 2019-06-16 RX ORDER — TRAMADOL HYDROCHLORIDE 50 MG/1
50 TABLET ORAL
Status: DISCONTINUED | OUTPATIENT
Start: 2019-06-16 | End: 2019-06-18 | Stop reason: HOSPADM

## 2019-06-16 RX ORDER — SODIUM CHLORIDE 9 MG/ML
125 INJECTION, SOLUTION INTRAVENOUS CONTINUOUS
Status: DISCONTINUED | OUTPATIENT
Start: 2019-06-16 | End: 2019-06-17

## 2019-06-16 RX ORDER — ENOXAPARIN SODIUM 100 MG/ML
40 INJECTION SUBCUTANEOUS DAILY
Status: DISCONTINUED | OUTPATIENT
Start: 2019-06-17 | End: 2019-06-18 | Stop reason: HOSPADM

## 2019-06-16 RX ORDER — FACIAL-BODY WIPES
10 EACH TOPICAL DAILY PRN
Status: DISCONTINUED | OUTPATIENT
Start: 2019-06-18 | End: 2019-06-18 | Stop reason: HOSPADM

## 2019-06-16 RX ORDER — DIPHENHYDRAMINE HYDROCHLORIDE 50 MG/ML
12.5 INJECTION, SOLUTION INTRAMUSCULAR; INTRAVENOUS
Status: ACTIVE | OUTPATIENT
Start: 2019-06-16 | End: 2019-06-17

## 2019-06-16 RX ORDER — PROPOFOL 10 MG/ML
INJECTION, EMULSION INTRAVENOUS
Status: DISCONTINUED | OUTPATIENT
Start: 2019-06-16 | End: 2019-06-16 | Stop reason: HOSPADM

## 2019-06-16 RX ORDER — SODIUM CHLORIDE 0.9 % (FLUSH) 0.9 %
5-40 SYRINGE (ML) INJECTION EVERY 8 HOURS
Status: DISCONTINUED | OUTPATIENT
Start: 2019-06-16 | End: 2019-06-18 | Stop reason: HOSPADM

## 2019-06-16 RX ORDER — SODIUM CHLORIDE, SODIUM LACTATE, POTASSIUM CHLORIDE, CALCIUM CHLORIDE 600; 310; 30; 20 MG/100ML; MG/100ML; MG/100ML; MG/100ML
INJECTION, SOLUTION INTRAVENOUS
Status: DISCONTINUED | OUTPATIENT
Start: 2019-06-16 | End: 2019-06-16 | Stop reason: HOSPADM

## 2019-06-16 RX ORDER — PROPOFOL 10 MG/ML
INJECTION, EMULSION INTRAVENOUS AS NEEDED
Status: DISCONTINUED | OUTPATIENT
Start: 2019-06-16 | End: 2019-06-16

## 2019-06-16 RX ORDER — POLYETHYLENE GLYCOL 3350 17 G/17G
17 POWDER, FOR SOLUTION ORAL DAILY
Status: DISCONTINUED | OUTPATIENT
Start: 2019-06-16 | End: 2019-06-18 | Stop reason: HOSPADM

## 2019-06-16 RX ORDER — CEFAZOLIN SODIUM/WATER 2 G/20 ML
2 SYRINGE (ML) INTRAVENOUS EVERY 8 HOURS
Status: COMPLETED | OUTPATIENT
Start: 2019-06-16 | End: 2019-06-17

## 2019-06-16 RX ORDER — NALOXONE HYDROCHLORIDE 0.4 MG/ML
0.4 INJECTION, SOLUTION INTRAMUSCULAR; INTRAVENOUS; SUBCUTANEOUS AS NEEDED
Status: DISCONTINUED | OUTPATIENT
Start: 2019-06-16 | End: 2019-06-18 | Stop reason: HOSPADM

## 2019-06-16 RX ORDER — SODIUM CHLORIDE 9 MG/ML
250 INJECTION, SOLUTION INTRAVENOUS AS NEEDED
Status: DISCONTINUED | OUTPATIENT
Start: 2019-06-16 | End: 2019-06-16

## 2019-06-16 RX ORDER — LIDOCAINE HYDROCHLORIDE 20 MG/ML
INJECTION, SOLUTION INFILTRATION; PERINEURAL AS NEEDED
Status: DISCONTINUED | OUTPATIENT
Start: 2019-06-16 | End: 2019-06-16 | Stop reason: HOSPADM

## 2019-06-16 RX ORDER — EPHEDRINE SULFATE 50 MG/ML
INJECTION, SOLUTION INTRAVENOUS AS NEEDED
Status: DISCONTINUED | OUTPATIENT
Start: 2019-06-16 | End: 2019-06-16 | Stop reason: HOSPADM

## 2019-06-16 RX ORDER — ONDANSETRON 2 MG/ML
4 INJECTION INTRAMUSCULAR; INTRAVENOUS
Status: DISCONTINUED | OUTPATIENT
Start: 2019-06-16 | End: 2019-06-18 | Stop reason: HOSPADM

## 2019-06-16 RX ORDER — SODIUM CHLORIDE 0.9 % (FLUSH) 0.9 %
5-40 SYRINGE (ML) INJECTION AS NEEDED
Status: DISCONTINUED | OUTPATIENT
Start: 2019-06-16 | End: 2019-06-18 | Stop reason: HOSPADM

## 2019-06-16 RX ADMIN — EPHEDRINE SULFATE 10 MG: 50 INJECTION, SOLUTION INTRAVENOUS at 08:28

## 2019-06-16 RX ADMIN — Medication 2 G: at 22:45

## 2019-06-16 RX ADMIN — HYDROMORPHONE HYDROCHLORIDE 0.5 MG: 1 INJECTION, SOLUTION INTRAMUSCULAR; INTRAVENOUS; SUBCUTANEOUS at 20:41

## 2019-06-16 RX ADMIN — ACETAMINOPHEN 650 MG: 325 TABLET ORAL at 14:02

## 2019-06-16 RX ADMIN — MORPHINE SULFATE 2 MG: 4 INJECTION, SOLUTION INTRAMUSCULAR; INTRAVENOUS at 12:41

## 2019-06-16 RX ADMIN — OXYCODONE HYDROCHLORIDE 5 MG: 5 TABLET ORAL at 22:20

## 2019-06-16 RX ADMIN — MORPHINE SULFATE 2 MG: 4 INJECTION, SOLUTION INTRAMUSCULAR; INTRAVENOUS at 17:06

## 2019-06-16 RX ADMIN — FENTANYL CITRATE 50 MCG: 50 INJECTION, SOLUTION INTRAMUSCULAR; INTRAVENOUS at 07:55

## 2019-06-16 RX ADMIN — SODIUM CHLORIDE, SODIUM LACTATE, POTASSIUM CHLORIDE, CALCIUM CHLORIDE: 600; 310; 30; 20 INJECTION, SOLUTION INTRAVENOUS at 07:43

## 2019-06-16 RX ADMIN — TRAMADOL HYDROCHLORIDE 50 MG: 50 TABLET, FILM COATED ORAL at 18:54

## 2019-06-16 RX ADMIN — SODIUM CHLORIDE 125 ML/HR: 900 INJECTION, SOLUTION INTRAVENOUS at 09:49

## 2019-06-16 RX ADMIN — Medication 2 G: at 14:02

## 2019-06-16 RX ADMIN — MORPHINE SULFATE 2 MG: 4 INJECTION, SOLUTION INTRAMUSCULAR; INTRAVENOUS at 01:02

## 2019-06-16 RX ADMIN — BUPIVACAINE HYDROCHLORIDE 2.4 ML: 7.5 INJECTION, SOLUTION EPIDURAL; RETROBULBAR at 08:00

## 2019-06-16 RX ADMIN — Medication 10 ML: at 22:00

## 2019-06-16 RX ADMIN — Medication 10 ML: at 05:01

## 2019-06-16 RX ADMIN — EPHEDRINE SULFATE 10 MG: 50 INJECTION, SOLUTION INTRAVENOUS at 08:33

## 2019-06-16 RX ADMIN — EPHEDRINE SULFATE 10 MG: 50 INJECTION, SOLUTION INTRAVENOUS at 08:25

## 2019-06-16 RX ADMIN — OXYCODONE HYDROCHLORIDE 5 MG: 5 TABLET ORAL at 15:48

## 2019-06-16 RX ADMIN — Medication 2 G: at 08:19

## 2019-06-16 RX ADMIN — MORPHINE SULFATE 2 MG: 4 INJECTION, SOLUTION INTRAMUSCULAR; INTRAVENOUS at 05:30

## 2019-06-16 RX ADMIN — LIDOCAINE HYDROCHLORIDE 60 MG: 20 INJECTION, SOLUTION INFILTRATION; PERINEURAL at 07:50

## 2019-06-16 RX ADMIN — FENTANYL CITRATE 50 MCG: 50 INJECTION, SOLUTION INTRAMUSCULAR; INTRAVENOUS at 07:44

## 2019-06-16 RX ADMIN — ONDANSETRON 4 MG: 2 INJECTION INTRAMUSCULAR; INTRAVENOUS at 16:06

## 2019-06-16 RX ADMIN — SODIUM CHLORIDE 125 ML/HR: 900 INJECTION, SOLUTION INTRAVENOUS at 18:54

## 2019-06-16 RX ADMIN — PROPOFOL 50 MCG/KG/MIN: 10 INJECTION, EMULSION INTRAVENOUS at 08:18

## 2019-06-16 NOTE — PROGRESS NOTES
Altaf Salinas [facility]  [address] [facility], [address] [place] [address]  [phone]      Medical Progress Note      NAME: Tung Freeman   :  1940  MRM:  [id number]    Date/Time: 2019  11:12 AM       Assessment and Plan:   1. LT hip fracture ([address] (6/15/2019) after GLF. S/p ORIP on . Management per ortho.      2. Benign essential HTN (10/5/2017). Not on medication. Monitor      3. IBS (irritable bowel syndrome). Symptomatic treatment      4.  Leukocytosis (6/15/2019). Likely reactive. Resolved.            Subjective:     Chief Complaint:  Follow up of pt who was admitted with hip fracture. ROS:  (bold if positive, if negative)      Tolerating PT  Tolerating Diet        Objective:     Last 24hrs VS reviewed since prior progress note.  Most recent are:    Visit Vitals  /56   Pulse 70   Temp 97.5 °F (36.4 °C)   Resp 9   Ht 5' 5\" (1.651 m)   Wt 86.2 kg (190 lb)   SpO2 91%   BMI 31.62 kg/m²     SpO2 Readings from Last 6 Encounters:   19 91%   19 96%   11/15/18 95%   18 95%   17 98%    O2 Flow Rate (L/min): 2 l/min       Intake/Output Summary (Last 24 hours) at 2019 1112  Last data filed at 2019 0929  Gross per 24 hour   Intake 1400 ml   Output 850 ml   Net 550 ml        Physical Exam:    Gen:  Well-developed, well-nourished, in no acute distress  HEENT:  Pink conjunctivae, PERRL, hearing intact to voice, moist mucous membranes  Neck:  Supple, without masses, thyroid non-tender  Resp:  No accessory muscle use, clear breath sounds without wheezes rales or rhonchi  Card:  No murmurs, normal S1, S2 without thrills, bruits or peripheral edema  Abd:  Soft, non-tender, non-distended, normoactive bowel sounds are present, no palpable organomegaly and no detectable hernias  Lymph:  No cervical or inguinal adenopathy  Musc:  No cyanosis or clubbing  Skin:  No rashes or ulcers, skin turgor is good  Neuro:  Cranial nerves are grossly intact, no focal motor weakness, follows commands appropriately  Psych:  Good insight, oriented to person, place and time, alert  __________________________________________________________________  Medications Reviewed: (see below)  Medications:     Current Facility-Administered Medications   Medication Dose Route Frequency    0.9% sodium chloride infusion  125 mL/hr IntraVENous CONTINUOUS    sodium chloride (NS) flush 5-40 mL  5-40 mL IntraVENous Q8H    sodium chloride (NS) flush 5-40 mL  5-40 mL IntraVENous PRN    naloxone (NARCAN) injection 0.4 mg  0.4 mg IntraVENous PRN    [START ON 6/17/2019] calcium-vitamin D (OS-MARCIE) 500 mg-200 unit tablet  1 Tab Oral TID WITH MEALS    senna-docusate (PERICOLACE) 8.6-50 mg per tablet 1 Tab  1 Tab Oral BID    polyethylene glycol (MIRALAX) packet 17 g  17 g Oral DAILY    [START ON 6/18/2019] bisacodyl (DULCOLAX) suppository 10 mg  10 mg Rectal DAILY PRN    ceFAZolin (ANCEF) 2 g/20 mL in sterile water IV syringe  2 g IntraVENous Q8H    acetaminophen (TYLENOL) tablet 650 mg  650 mg Oral Q6H    traMADol (ULTRAM) tablet 50 mg  50 mg Oral Q6H PRN    oxyCODONE IR (ROXICODONE) tablet 2.5 mg  2.5 mg Oral Q4H PRN    oxyCODONE IR (ROXICODONE) tablet 5 mg  5 mg Oral Q4H PRN    HYDROmorphone (DILAUDID) syringe 0.5 mg  0.5 mg IntraVENous Q4H PRN    diphenhydrAMINE (BENADRYL) injection 12.5 mg  12.5 mg IntraVENous Q6H PRN    [START ON 6/17/2019] enoxaparin (LOVENOX) injection 40 mg  40 mg SubCUTAneous DAILY    sodium chloride (NS) flush 5-40 mL  5-40 mL IntraVENous Q8H    sodium chloride (NS) flush 5-40 mL  5-40 mL IntraVENous PRN    morphine injection 2 mg  2 mg IntraVENous Q4H PRN        Lab Data Reviewed: (see below)  Lab Review:     Recent Labs     06/16/19  0346 06/15/19  1633   WBC 9.4 13.1*   HGB 13.1 13.5   HCT 41.8 41.2    258     Recent Labs     06/16/19  0346 06/15/19  1633    137   K 4.3 4.2    107   CO2 29 28   * 111*   BUN 22* 19   CREA 0.79 0.79   CA 9.8 9.8     Lab Results   Component Value Date/Time    Glucose (POC) 109 (H) 11/13/2017 03:15 PM     No results for input(s): PH, PCO2, PO2, HCO3, FIO2 in the last 72 hours. No results for input(s): INR in the last 72 hours. No lab exists for component: INREXT  All Micro Results     Procedure Component Value Units Date/Time    URINE CULTURE HOLD SAMPLE [[id number]]     Order Status:  Canceled Specimen:  Urine           I have reviewed notes of prior 24hr. Other pertinent lab:       Total time spent with patient: 30 [address] discussed with: Patient, Nursing Staff and >50% of time spent in counseling and coordination of care    Discussed:  Care Plan    Prophylaxis:  Lovenox    Disposition:  Home w/Family           ___________________________________________________    Attending Physician: Jennifer Hopkins MD

## 2019-06-16 NOTE — OP NOTES
Altaf Salinas [facility] 80  OPERATIVE REPORT    Name:  Candi Tapia  MR#:  [id number]  :  1940  ACCOUNT #:  [de-identified]  DATE OF SERVICE:  2019    PREOPERATIVE DIAGNOSIS:  Left hip valgus impacted femoral neck fracture. POSTOPERATIVE DIAGNOSIS:  Left hip valgus impacted femoral neck fracture. PROCEDURE PERFORMED:  Left hip percutaneous screw fixation. SURGEON:  Keegan Condon MD    ANESTHESIA:  Spinal anesthetic. COMPLICATIONS:  None. SPECIMENS REMOVED:  None. IMPLANTS:  Shai titanium cannulated screw, short thread, 6.5 x 85 mm x2 and 6.5 x 80 mm x1. ESTIMATED BLOOD LOSS:  < 25 cc    OPERATIVE INDICATION:  The patient is a 81-year-old female who sustained a ground-level fall the evening of 06/15/2019. She was brought to the [place] [address] where x-rays revealed a valgus impacted femoral neck fracture. I reviewed treatment options with the patient. My recommendation was for a left hip percutaneous screw fixation. Risks, benefits, and alternatives of surgery were discussed in detail with the patient. These include but are not limited to infection, bleeding, neurovascular injury, continued pain, hip stiffness, nonunion, malunion, avascular necrosis, DVT, PE, and anesthetic complications. The patient signed the appropriate surgical consent. OPERATION:  The patient was brought to the [place] [address] Operating Room on 2019. The left hip was marked in the preoperative holding area. She underwent a spinal anesthetic per the Anesthesia Team.  She was brought to the operating room and laid supine on the Bernice table. Contralateral right hip was flexed to 30 degrees and abducted to allow for intraoperative C-arm imaging. She received Ancef 2 g for preoperative antibiotics. The left lower extremity was then prepped and draped in the usual sterile fashion.   A routine preoperative time-out was performed by the entire operating staff per hospital protocol. The BioVidria cannulated screw set was utilized. The three guide pins were then inserted in an inverted triangle fashion first starting with the inferior pin. All three pins were started above the lesser trochanter to avoid a stress riser postoperatively. Once the inferior pin was placed, the anterior-superior and then the posterior-superior pins were then inserted in stable position. They were then measured with the anterior and posterior-superior pin measuring 85 mm and the anterior-superior pin measuring 80 mm. The lateral cortex was then drilled. The 6.5 mm titanium short thread screws were then inserted on power and then tightened finally by hand. Final AP, lateral, and oblique operative images were obtained confirming no subchondral screw penetration. There was excellent fixation of all three screws. The wound was copiously irrigated with bacitracin-impregnated normal saline. They were then closed with interrupted 3-0 nylon suture. An Aquacel dressing was applied. The patient was awoken from anesthesia and transferred to hospital bed in a safe fashion. She was taken to the PACU in stable condition. Sponge and instrument counts were correct x2. POSTOPERATIVE PLAN:  Will be touchdown weightbearing on the left lower extremity for six weeks postoperatively. She will start Lovenox 40 mg subcutaneously daily on postoperative day #1 and will continue this for 4 total weeks. She will follow up at the [place] [address] office in 2-3 weeks for repeat clinical evaluation and new xrays.         Eneida Guerra MD [id number]/  D:  06/16/2019 9:43  T:  06/16/2019 18:01  JOB #:  [id number]

## 2019-06-16 NOTE — ROUTINE PROCESS
TRANSFER - OUT REPORT: 
 
Verbal report given to Butch Echols RN on Cristiano Wise  being transferred to [address] [place] [address] for routine post - op Report consisted of patients Situation, Background, Assessment and  
Recommendations(SBAR). Information from the following report(s) SBAR and OR Summary was reviewed with the receiving nurse. Lines:  
Peripheral IV 06/15/19 Left Antecubital (Active) Site Assessment Clean, dry, & intact 6/16/2019  9:35 AM  
Phlebitis Assessment 0 6/16/2019  9:35 AM  
Infiltration Assessment 0 6/16/2019  9:35 AM  
Dressing Status Clean, dry, & intact 6/16/2019  9:35 AM  
Dressing Type Tape;Transparent 6/16/2019  9:35 AM  
Hub Color/Line Status Pink; Infusing 6/16/2019  9:35 AM  
Action Taken Open ports on tubing capped 6/16/2019  7:31 AM  
Alcohol Cap Used Yes 6/16/2019  9:35 AM  
  
 
Opportunity for questions and clarification was provided. Patient transported with: 
 O2 @ 2 liters Registered Nurse

## 2019-06-16 NOTE — PROGRESS NOTES
Plan for OR in the am for left hip percutaneous pinning. NPO after midnight. Will file consult once order has been placed formally for consultation.           Jossue Gardner PA-C  [facility]  [address] [place] [address]

## 2019-06-16 NOTE — PERIOP NOTES
Called blood bank to verify they do not need a tube for the type and screen ordered by Dr. Hamida Munoz. Blood bank stated they have all the tubes they need for patient to do type and screen, I do not need to send a pink top tube down to lab.

## 2019-06-16 NOTE — ANESTHESIA POSTPROCEDURE EVALUATION
Procedure(s):  HIP PERCUTANEOUS PINNING CANNULATED SCREWS PAKO.    spinal, MAC    Anesthesia Post Evaluation      Multimodal analgesia: multimodal analgesia not used between 6 hours prior to anesthesia start to PACU discharge  Patient location during evaluation: PACU  Patient participation: complete - patient participated  Level of consciousness: awake  Pain management: adequate  Airway patency: patent  Anesthetic complications: no  Cardiovascular status: acceptable, blood pressure returned to baseline and hemodynamically stable  Respiratory status: acceptable  Hydration status: acceptable  Post anesthesia nausea and vomiting:  controlled      Vitals Value Taken Time   /52 6/16/2019 10:15 AM   Temp 36.4 °C (97.5 °F) 6/16/2019  9:44 AM   Pulse 65 6/16/2019 10:19 AM   Resp 10 6/16/2019 10:19 AM   SpO2 94 % 6/16/2019 10:19 AM   Vitals shown include unvalidated device data.

## 2019-06-16 NOTE — PROGRESS NOTES
Occupational Therapy    Occupational Therapy   Orders received, chart review completed. Note patient POD #0 from L hip percutaneous pinning. OT will follow up tomorrow for evaluation. Thank you.      Hanna Rand, OT

## 2019-06-16 NOTE — PROGRESS NOTES
6/16/2019 11:56 AM Case management consult for discharge planning received. EMR reviewed, noted pt will be TDWB for 6 weeks. Met with pt. Charted address and phone numbers confirmed. Reason for Admission: Left hip pain after a fall                      RRAT Score:  7                   Plan for utilizing home health: No                          Current Advanced Directive/Advance Care Plan: Not on file   Name Relation Home Work ЮЛИЯ Shaver [phone]     Jennifer Diallo   [phone]                          Transition of Care Plan:  SNF placement     Pt lives with her  in a 2 story home in [place]. There are 2 steps to enter and 13 steps to pt's bedroom. Pt was independent with adls and driving prior to admission. Pt reported her (who is 81years old) is unable to provide assistance at home after discharge. Pt has rx coverage. CM reviewed IPR vs SNF at length with pt. Pt was understanding SNF placement will likely be pursued due to TDWB and not having assistance at home. Pt reported she had been to a SNF in [place] but did not have a good experience due to having a roommate. Pt is requesting to not have a roommate at SNF. Pt reported she would like a facility that is closer to her son, who works at [employer] and lives in [place]. Choice letter was signed by pt for [facility], [address] [facility], [address] [place] [address]. Referrals were sent to preferred facilities. CM will follow. MARIA INES Arce    Discharge plan:  PT and OT darrin for final recommendations for discharge  Pt will need 3 midnights for SNF placement, earliest discharge can be is 6/18. Referrals pending for '[facility], [facility] [address] of [facility], [address]         Care Management Interventions  PCP Verified by CM: Yes(Dr. Windy Chester, nurse navigator notified of admission .)  Transition of Care Consult (CM Consult): Discharge Planning  MyChart Signup: No  Discharge Durable Medical Equipment: No  Physical Therapy Consult: Yes  Occupational Therapy Consult: Yes  Speech Therapy Consult: No  Current Support Network: Lives with Spouse, Own Home  Plan discussed with Pt/Family/Caregiver: Yes  Freedom of Choice Offered: (S) Yes  Discharge Location  Discharge Placement: Skilled nursing facility  \

## 2019-06-16 NOTE — PROGRESS NOTES
Orders received, chart review completed. Note patient POD #0 from L hip percutaneous pinning. Physical Therapy will follow up tomorrow for evaluation.   Thank you

## 2019-06-16 NOTE — CONSULTS
[address]     Name:  Eboni Arias  MR#:  [id number]  :  1940  ACCOUNT #:  [de-identified]  DATE OF SERVICE:  2019    ORTHOPEDIC SURGERY CONSULTATION    CONSULTING PHYSICIAN:  Marquis Frederic MD    REASON FOR CONSULTATION:  Left hip valgus impacted femoral neck fracture. HISTORY OF PRESENT ILLNESS:  The patient is a 80-year-old female with history of breast cancer, hypertension, irritable bowel syndrome, and sciatica, who sustained a ground-level fall off of her deck the evening of 06/15/2019. She was on the ground for two hours until a family member arrived. She was brought to the [place] [facility] where x-rays demonstrated a valgus impacted left femoral neck fracture. She was admitted to the Medical Service for surgical optimization. PAST MEDICAL HISTORY:  1. Breast cancer in . 2.  Irritable bowel syndrome. 3.  Sciatica. 4.  Anxiety. 5.  TMJ.  6.  History of UTI. MEDICATIONS:  Listed per chart. ALLERGIES:  REPORTEDLY TO LIDOCAINE. PAST SURGICAL HISTORY:  1.  Bilateral total knee replacement. 2.  History of cholecystectomy. 3.  History of colonoscopy. 4.  History of thyroidectomy. FAMILY HISTORY:  1. Stroke. 2.  Heart disease. 3.  Diabetes. 4.  Thyroid cancer. SOCIAL HISTORY:  She is . Her  has dementia. She drinks two glasses of wine per week. She is a nonsmoker. She does not use an ambulatory aid. REVIEW OF SYSTEMS:  10-point review of systems is positive for arthralgias. PHYSICAL EXAMINATION:  VITAL SIGNS:  Most recent set of vital signs, temperature 98.1, blood pressure 152/74, pulse 63, respirations 17, oxygen saturation 99%. HEENT:  Normocephalic, atraumatic. Sclerae are nonicteric. NECK:  Supple with no JVD. CARDIOVASCULAR:  Regular rate. PULMONARY:  Nonlabored bilaterally. ABDOMEN:  Soft, nontender. MUSCULOSKELETAL:  She has tenderness over the left hip. She has pain with gentle logroll maneuver. NEUROLOGIC:  Intact tibialis anterior, extensor hallucis longus, gastroc and soleus function of bilateral lower extremities. VASCULAR:  2+ DT and PT pulses bilaterally. LABORATORY RESULTS:  White blood cell count 9.4, hemoglobin 13.1, hematocrit 41.8, platelets 502. Sodium 137, potassium 4.3. BUN 22, creatinine 0.79. Glucose 117. RADIOGRAPHS:  To include left hip and pelvis and CT scan of the left lower extremity demonstrate a valgus impacted femoral neck fracture. There is a 1-cm size sclerotic lesion within the medial roof of the right acetabulum consistent with a probable bone island. ASSESSMENT:  67-year-old female with left hip valgus impacted femoral neck fracture. PLAN:  I discussed these findings with the patient. My recommendation is for a left hip percutaneous pinning. Risks, benefits, and alternatives of surgery were discussed in detail with patient. These include but are not limited to infection, bleeding, neurovascular injury, continued pain, nonunion, malunion, avascular necrosis, need for additional surgery, DVT, PE, and anesthetic complication. Overall healing can be 8-12 weeks. She will be touch down weightbearing with a walker for the first 6 weeks. Lovenox for DVT prophylaxis x 4 weeks after surgery. The patient signed the appropriate surgical and blood consent.         Lalo Armstrong MD [id number]/  D:  06/16/2019 8:07  T:  06/16/2019 10:11  JOB #:  [id number]

## 2019-06-16 NOTE — ANESTHESIA PROCEDURE NOTES
Spinal Block    Start time: 6/16/2019 7:50 AM  End time: 6/16/2019 8:15 AM  Performed by: Lazara Green CRNA  Authorized by: Rose Cheung MD     Pre-procedure: Indications: primary anesthetic    Timeout Time: 07:50          Spinal Block:   Patient Position:  Right lateral decubitus  Prep Region:  Lumbar  Prep: Betadine      Location:  L3-4  Technique:  Single shot        Needle:   Needle Type:   Laura  Needle Gauge:  25 G  Attempts:  2      Events: CSF confirmed, no blood with aspiration and no paresthesia        Assessment:  Insertion:  Uncomplicated  Patient tolerance:  Patient tolerated the procedure well with no immediate complications

## 2019-06-16 NOTE — BRIEF OP NOTE
BRIEF OPERATIVE NOTE    Date of Procedure: 6/16/2019   Preoperative Diagnosis: Left Hip Fracture  Postoperative Diagnosis: Left Hip Fracture    Procedure(s):  LEFT HIP PERCUTANEOUS PINNING USING CANNULATED SCREWS SHAI  Surgeon(s) and Role:     * Higinio Le MD - Primary    Surgical Staff:  Circ-1: Noe Araya RN  Scrub Tech-1: Rudi POLANCO  Surg Asst-1: Bayron Pichardo  Event Time In Time Out   Incision Start 06/16/2019 0840    Incision Close       Anesthesia: General   Estimated Blood Loss: <25 cc  Specimens: None  Findings: Left hip valgus impacted femoral neck fx  Complications: None  Implants: Shai 6.5 mm titanium cannulated screws short thread 85 mm x 2 and 80 mm x 1

## 2019-06-17 LAB
ANION GAP SERPL CALC-SCNC: 3 MMOL/L (ref 5–15)
BUN SERPL-MCNC: 17 MG/DL (ref 6–20)
BUN/CREAT SERPL: 27 (ref 12–20)
CALCIUM SERPL-MCNC: 9.3 MG/DL (ref 8.5–10.1)
CHLORIDE SERPL-SCNC: 104 MMOL/L (ref 97–108)
CO2 SERPL-SCNC: 29 MMOL/L (ref 21–32)
CREAT SERPL-MCNC: 0.63 MG/DL (ref 0.55–1.02)
GLUCOSE SERPL-MCNC: 120 MG/DL (ref 65–100)
HGB BLD-MCNC: 12.5 G/DL (ref 11.5–16)
POTASSIUM SERPL-SCNC: 4.4 MMOL/L (ref 3.5–5.1)
SODIUM SERPL-SCNC: 136 MMOL/L (ref 136–145)

## 2019-06-17 PROCEDURE — 77030011943

## 2019-06-17 PROCEDURE — 97530 THERAPEUTIC ACTIVITIES: CPT

## 2019-06-17 PROCEDURE — 77030032490 HC SLV COMPR SCD KNE COVD -B

## 2019-06-17 PROCEDURE — 36415 COLL VENOUS BLD VENIPUNCTURE: CPT

## 2019-06-17 PROCEDURE — 85018 HEMOGLOBIN: CPT

## 2019-06-17 PROCEDURE — 51798 US URINE CAPACITY MEASURE: CPT

## 2019-06-17 PROCEDURE — 97161 PT EVAL LOW COMPLEX 20 MIN: CPT

## 2019-06-17 PROCEDURE — 74011250636 HC RX REV CODE- 250/636: Performed by: INTERNAL MEDICINE

## 2019-06-17 PROCEDURE — 65270000029 HC RM PRIVATE

## 2019-06-17 PROCEDURE — 97535 SELF CARE MNGMENT TRAINING: CPT | Performed by: OCCUPATIONAL THERAPIST

## 2019-06-17 PROCEDURE — 74011250636 HC RX REV CODE- 250/636: Performed by: ORTHOPAEDIC SURGERY

## 2019-06-17 PROCEDURE — 80048 BASIC METABOLIC PNL TOTAL CA: CPT

## 2019-06-17 PROCEDURE — 77030027138 HC INCENT SPIROMETER -A

## 2019-06-17 PROCEDURE — 97165 OT EVAL LOW COMPLEX 30 MIN: CPT | Performed by: OCCUPATIONAL THERAPIST

## 2019-06-17 PROCEDURE — 74011250637 HC RX REV CODE- 250/637: Performed by: ORTHOPAEDIC SURGERY

## 2019-06-17 PROCEDURE — 97530 THERAPEUTIC ACTIVITIES: CPT | Performed by: OCCUPATIONAL THERAPIST

## 2019-06-17 RX ADMIN — Medication 2 G: at 09:54

## 2019-06-17 RX ADMIN — SENNOSIDES, DOCUSATE SODIUM 1 TABLET: 50; 8.6 TABLET, FILM COATED ORAL at 18:12

## 2019-06-17 RX ADMIN — OYSTER SHELL CALCIUM WITH VITAMIN D 1 TABLET: 500; 200 TABLET, FILM COATED ORAL at 18:13

## 2019-06-17 RX ADMIN — ACETAMINOPHEN 650 MG: 325 TABLET ORAL at 18:13

## 2019-06-17 RX ADMIN — ACETAMINOPHEN 650 MG: 325 TABLET ORAL at 04:39

## 2019-06-17 RX ADMIN — Medication 10 ML: at 06:34

## 2019-06-17 RX ADMIN — Medication 5 ML: at 10:02

## 2019-06-17 RX ADMIN — OYSTER SHELL CALCIUM WITH VITAMIN D 1 TABLET: 500; 200 TABLET, FILM COATED ORAL at 09:58

## 2019-06-17 RX ADMIN — ENOXAPARIN SODIUM 40 MG: 40 INJECTION SUBCUTANEOUS at 10:01

## 2019-06-17 RX ADMIN — TRAMADOL HYDROCHLORIDE 50 MG: 50 TABLET, FILM COATED ORAL at 04:39

## 2019-06-17 RX ADMIN — SENNOSIDES, DOCUSATE SODIUM 1 TABLET: 50; 8.6 TABLET, FILM COATED ORAL at 09:59

## 2019-06-17 RX ADMIN — POLYETHYLENE GLYCOL 3350 17 G: 17 POWDER, FOR SOLUTION ORAL at 09:58

## 2019-06-17 NOTE — PROGRESS NOTES
Problem: Self Care Deficits Care Plan (Adult)  Goal: *Acute Goals and Plan of Care (Insert Text)  Description  Occupational Therapy Goals  Initiated 6/17/2019  1. Patient will perform lower body dressing with minimal assistance using adaptive equipment as needed within 7 day(s). 2.  Patient will perform upper body dressing with modified independence within 7 day(s). 3.  Patient will perform grooming standing at sink with minimal assistance  within 7 day(s). 4.  Patient will perform toilet transfers with minimal assistance, RW and following her LLE TTWB precautions within 7 day(s). 5.  Patient will perform all aspects of toileting with minimal assistance  within 7 day(s). 6.  Patient will participate in upper extremity therapeutic exercise/activities with supervision/set-up for 5 minutes within 7 day(s). 7.  Patient will utilize energy conservation techniques during functional activities with verbal cues within 7 day(s). Outcome: Progressing Towards Goal     OCCUPATIONAL THERAPY EVALUATION  Patient: Alfred Cuevas (85 y.o. female)  Date: 6/17/2019  Primary Diagnosis: Hip fracture (HCC) [S72.009A]  Hip fracture (HCC) [S72.009A]  Procedure(s) (LRB):  HIP PERCUTANEOUS PINNING CANNULATED SCREWS PAKO (Left) 1 Day Post-Op   Precautions: Fall, PWB, Bed Alarm(TTWB LLE)    ASSESSMENT :  Based on the objective data described below, the patient presents with decreased activity tolerance, mobility, strength, ROM, and endurance with c/o pain and high anxiety following admission for GLF and resulting L Hip fracture. She is POD #1 for percutaneous pinning. She currently requires up to Total A level for LB ADLs, transfers, and functional mobility. PTA she was Independent in all ADLs and IADLs including driving. She lives with her  who has dementia.  Pt with demanding affect and high anxiety about situation requiring max instruction and education about the importance of early mobility and safety in transfers while following TTWB precautions, requiring significant time to complete all tasks. BP dropped from supine to standing, pt refused in sitting (see vitals below). Pt will not be safe to return home at this time as she mcpherson snot have any assist available and currently requires significant physical and cognitive assist for safety. Recommend rehab. Patient will benefit from skilled intervention to address the above impairments. Patients rehabilitation potential is considered to be Good  Factors which may influence rehabilitation potential include:   ? None noted  ? Mental ability/status  ? Medical condition  ? Home/family situation and support systems  ? Safety awareness  ? Pain tolerance/management  ? Other:      PLAN :  Recommendations and Planned Interventions:  ?               Self Care Training                  ? Therapeutic Activities  ? Functional Mobility Training    ? Cognitive Retraining  ? Therapeutic Exercises           ? Endurance Activities  ? Balance Training                   ? Neuromuscular Re-Education  ? Visual/Perceptual Training     ? Home Safety Training  ? Patient Education                 ? Family Training/Education  ? Other (comment):    Frequency/Duration: Patient will be followed by occupational therapy 5 times a week to address goals. Discharge Recommendations: Rehab  Further Equipment Recommendations for Discharge: none     SUBJECTIVE:   Patient stated I want to take it one step at a time.     OBJECTIVE DATA SUMMARY:   HISTORY:   Past Medical History:   Diagnosis Date    Benign essential HTN 10/5/2017    Cancer [facility])     breast    History of breast cancer 11/2004    right  - s/p lumpectomy w sentinal node 11/2004.   s/p XRT 1 mo.  could not tolerate tamoxifen    IBS (irritable bowel syndrome)     Ill-defined condition     left sciatica    Situational anxiety      with dementia    TMJ (temporomandibular joint syndrome)     UTI (urinary tract infection)     recurrent. last 9/2017     Past Surgical History:   Procedure Laterality Date    HX CHOLECYSTECTOMY      HX COLONOSCOPY  2016    states benign polyp - repeat 2019    HX KNEE ARTHROSCOPY Right 04/11/2017    partial knee replacement in [place]    HX ORTHOPAEDIC      bilat knee replacement    HX THYROIDECTOMY  2004    nodules, goiter       Prior Level of Function/Environment/Context: Independent in ADLs and IADLs, including driving. Lives with  who has dementia. No prior history of falls. Home Situation  Home Environment: Private residence  # Steps to Enter: 2  Rails to Enter: No  One/Two Story Residence: Two story  # of Interior Steps: 12  Interior Rails: Both  Lift Chair Available: No  Living Alone: No  Support Systems: Spouse/Significant Other/Partner, Family member(s)  Patient Expects to be Discharged to[de-identified] Skilled nursing facility  Current DME Used/Available at Home: Walker, rolling  Tub or Shower Type: Tub/Shower combination      EXAMINATION OF PERFORMANCE DEFICITS:  Cognitive/Behavioral Status:  Neurologic State: Alert  Orientation Level: Oriented X4  Cognition: Decreased command following  Perception: Appears intact  Safety/Judgement: Decreased awareness of need for safety;Driving appropriateness; Fall prevention;Home safety      Hearing: Auditory  Auditory Impairment: None  Hearing Aids/Status: Does not own    Vision/Perceptual:    Acuity: Within Defined Limits         Range of Motion:  AROM: Generally decreased, functional  PROM: Generally decreased, functional     Strength:  Strength: Generally decreased, functional    Coordination:  Coordination: Generally decreased, functional  Fine Motor Skills-Upper: Left Intact; Right Intact    Gross Motor Skills-Upper: Left Intact; Right Intact    Tone & Sensation:  Tone: Normal  Sensation: Intact    Balance:  Sitting: Intact  Standing: Impaired; With support(RW)  Standing - Static: Poor;Constant support  Standing - Dynamic : Poor;Constant support    Functional Mobility and Transfers for ADLs:  Bed Mobility:  Rolling: Total assistance;Assist x2  Supine to Sit: Assist x2;Maximum assistance; Additional time  Sit to Supine: Assist x2; Moderate assistance; Additional time  Scooting: Total assistance;Assist x2    Transfers:  Sit to Stand: Assist x2; Moderate assistance  Stand to Sit: Moderate assistance; Additional time;Assist x2  Bed to Chair: Assist x2; Additional time; Moderate assistance  Toilet Transfer : Total assistance; Additional time;Assist x2(bed replaced wit5h BS as pt unable to pivot)    ADL Assessment:  Feeding: Independent    Oral Facial Hygiene/Grooming: Total assistance(standing at sink- set-up seated)    Bathing: Moderate assistance; Additional time(A for buttocks and LEs)    Upper Body Dressing: Setup; Additional time    Lower Body Dressing: Total assistance    Toileting: Total assistance    ADL Intervention and task modifications:  Cognitive Retraining  Safety/Judgement: Decreased awareness of need for safety;Driving appropriateness; Fall prevention;Home safety      Functional Measure:  Barthel Index:    Bathin  Bladder: 10  Bowels: 10  Groomin  Dressin  Feeding: 10  Mobility: 0  Stairs: 0  Toilet Use: 5  Transfer (Bed to Chair and Back): 5  Total: 45/100        Percentage of impairment   0%   1-19%   20-39%   40-59%   60-79%   80-99%   100%   Barthel Score 0-100 100 99-80 79-60 59-40 20-39 1-19   0     The Barthel ADL Index: Guidelines  1. The index should be used as a record of what a patient does, not as a record of what a patient could do. 2. The main aim is to establish degree of independence from any help, physical or verbal, however minor and for whatever reason. 3. The need for supervision renders the patient not independent.   4. A patient's performance should be established using the best available evidence. Asking the patient, friends/relatives and nurses are the usual sources, but direct observation and common sense are also important. However direct testing is not needed. 5. Usually the patient's performance over the preceding 24-48 hours is important, but occasionally longer periods will be relevant. 6. Middle categories imply that the patient supplies over 50 per cent of the effort. 7. Use of aids to be independent is allowed. Sylvia Parra., Barthel, DJonnathanW. (9736). Functional evaluation: the Barthel Index. [address] [facility] (14)2. Hasrhil Nugent gee [facility] [place], Mera Saunders., Guru Horan., [place], [address] (1999). Measuring the change indisability after inpatient rehabilitation; comparison of the responsiveness of the Barthel Index and Functional Itmann Measure. Journal of Neurology, Neurosurgery, and Psychiatry, 66(4), 713-873. Wiseman [place], N.[place].A, CHRIS White, & Rafita Lane, M.A. (2004.) Assessment of post-stroke quality of life in cost-effectiveness studies: The usefulness of the Barthel Index and the EuroQoL-5D. Quality of Life Research, 15, 198-23       Occupational Therapy Evaluation Charge Determination   History Examination Decision-Making   LOW Complexity : Brief history review  LOW Complexity : 1-3 performance deficits relating to physical, cognitive , or psychosocial skils that result in activity limitations and / or participation restrictions  LOW Complexity : No comorbidities that affect functional and no verbal or physical assistance needed to complete eval tasks       Based on the above components, the patient evaluation is determined to be of the following complexity level: LOW    Patient Vitals for the past 4 hrs:   Temp Pulse Resp BP SpO2   06/17/19 0915  94  117/71 94 %   06/17/19 0842  81  160/83 96 %       Pain:  Pain Scale 1: Visual  Pain Intensity 1: 0  Pain Location 1: Back; Hip  Pain Orientation 1: Mid;Left  Pain Description 1: Aching  Pain Intervention(s) 1: Medication (see MAR)  Activity Tolerance:   Poor  Please refer to the flowsheet for vital signs taken during this treatment. After treatment:   ? Patient left in no apparent distress sitting up in chair  ? Patient left in no apparent distress in bed  ? Call bell left within reach  ? Nursing notified  ? Caregiver present  ? Bed alarm activated    COMMUNICATION/EDUCATION:   The patients plan of care was discussed with: Physical Therapist and Occupational Therapist.  ? Home safety education was provided and the patient/caregiver indicated understanding. ? Patient/family have participated as able in goal setting and plan of care. ? Patient/family agree to work toward stated goals and plan of care. ? Patient understands intent and goals of therapy, but is neutral about his/her participation. ? Patient is unable to participate in goal setting and plan of care. This patients plan of care is appropriate for delegation to [facility] [place]. Regarding student involvement in patient care:  A student participated in this treatment session. Per CMS Medicare statements and AOTA guidelines I certify that the following was true:  1. I was present and directly observed the entire session. 2. I made all skilled judgments and clinical decisions regarding care. 3. I am the practitioner responsible for assessment, treatment, and documentation.      Thank you for this referral.  Laron Cullen  Time Calculation: 67 mins

## 2019-06-17 NOTE — PROGRESS NOTES
[facility] [address], [place], [address] [facility]  [phone]      Medical Progress Note      NAME: Stephen Leone   :  1940  MRM:  [id number]    Date/Time: 2019  11:12 AM       Assessment and Plan:   1. LT hip fracture ([address] (6/15/2019) after GLF. S/p ORIP on . Management per ortho.      2. Benign essential HTN (10/5/2017). Not on medication. Monitor      3. IBS (irritable bowel syndrome). Symptomatic treatment      4.  Leukocytosis (6/15/2019). Likely reactive. Resolved.            Subjective:     Chief Complaint:  Follow up of pt who was admitted with hip fracture. C/o abdominal blotting     ROS:  (bold if positive, if negative)      Tolerating PT  Tolerating Diet        Objective:     Last 24hrs VS reviewed since prior progress note. Most recent are:    Visit Vitals  /71 (BP 1 Location: Left arm, BP Patient Position: During activity; Sitting)   Pulse 94   Temp 98.4 °F (36.9 °C)   Resp 14   Ht 5' 5\" (1.651 m)   Wt 86.2 kg (190 lb)   SpO2 94%   BMI 31.62 kg/m²     SpO2 Readings from Last 6 Encounters:   19 94%   19 96%   11/15/18 95%   18 95%   17 98%    O2 Flow Rate (L/min): 2 l/min       Intake/Output Summary (Last 24 hours) at 2019 5954  Last data filed at 2019 5505  Gross per 24 hour   Intake 2681.67 ml   Output    Net 2681.67 ml        Physical Exam:    Gen:  Well-developed, well-nourished, in no acute distress  HEENT:  Pink conjunctivae, PERRL, hearing intact to voice, moist mucous membranes  Neck:  Supple, without masses, thyroid non-tender  Resp:  No accessory muscle use, clear breath sounds without wheezes rales or rhonchi  Card:  No murmurs, normal S1, S2 without thrills, bruits or peripheral edema  Abd:  Soft, non-tender, non-distended, normoactive bowel sounds are present, no palpable organomegaly and no detectable hernias  Lymph:  No cervical or inguinal adenopathy  Musc:  No cyanosis or clubbing  Skin:  No rashes or ulcers, skin turgor is good  Neuro:  Cranial nerves are grossly intact, no focal motor weakness, follows commands appropriately  Psych:  Good insight, oriented to person, place and time, alert  __________________________________________________________________  Medications Reviewed: (see below)  Medications:     Current Facility-Administered Medications   Medication Dose Route Frequency    0.9% sodium chloride infusion  125 mL/hr IntraVENous CONTINUOUS    sodium chloride (NS) flush 5-40 mL  5-40 mL IntraVENous Q8H    sodium chloride (NS) flush 5-40 mL  5-40 mL IntraVENous PRN    naloxone (NARCAN) injection 0.4 mg  0.4 mg IntraVENous PRN    calcium-vitamin D (OS-MARCIE) 500 mg-200 unit tablet  1 Tab Oral TID WITH MEALS    senna-docusate (PERICOLACE) 8.6-50 mg per tablet 1 Tab  1 Tab Oral BID    polyethylene glycol (MIRALAX) packet 17 g  17 g Oral DAILY    [START ON 6/18/2019] bisacodyl (DULCOLAX) suppository 10 mg  10 mg Rectal DAILY PRN    ceFAZolin (ANCEF) 2 g/20 mL in sterile water IV syringe  2 g IntraVENous Q8H    acetaminophen (TYLENOL) tablet 650 mg  650 mg Oral Q6H    traMADol (ULTRAM) tablet 50 mg  50 mg Oral Q6H PRN    oxyCODONE IR (ROXICODONE) tablet 2.5 mg  2.5 mg Oral Q4H PRN    oxyCODONE IR (ROXICODONE) tablet 5 mg  5 mg Oral Q4H PRN    HYDROmorphone (DILAUDID) syringe 0.5 mg  0.5 mg IntraVENous Q4H PRN    diphenhydrAMINE (BENADRYL) injection 12.5 mg  12.5 mg IntraVENous Q6H PRN    enoxaparin (LOVENOX) injection 40 mg  40 mg SubCUTAneous DAILY    ondansetron (ZOFRAN) injection 4 mg  4 mg IntraVENous Q6H PRN    sodium chloride (NS) flush 5-40 mL  5-40 mL IntraVENous PRN    morphine injection 2 mg  2 mg IntraVENous Q4H PRN        Lab Data Reviewed: (see below)  Lab Review:     Recent Labs     06/17/19  0557 06/16/19  0346 06/15/19  1633   WBC  --  9.4 13.1*   HGB 12.5 13.1 13.5   HCT  --  41.8 41.2   PLT  --  235 258     Recent Labs     06/17/19  0557 06/16/19  0346 06/15/19  1633    137 137   K 4.4 4.3 4.2    104 107   CO2 29 29 28   * 117* 111*   BUN 17 22* 19   CREA 0.63 0.79 0.79   CA 9.3 9.8 9.8     Lab Results   Component Value Date/Time    Glucose (POC) 109 (H) 11/13/2017 03:15 PM     No results for input(s): PH, PCO2, PO2, HCO3, FIO2 in the last 72 hours. No results for input(s): INR in the last 72 hours. No lab exists for component: INREXT, INREXT  All Micro Results     Procedure Component Value Units Date/Time    URINE CULTURE HOLD SAMPLE [[id number]]     Order Status:  Canceled Specimen:  Urine           I have reviewed notes of prior 24hr. Other pertinent lab:       Total time spent with patient: [address] discussed with: Patient, Nursing Staff and >50% of time spent in counseling and coordination of care    Discussed:  Care Plan    Prophylaxis:  Lovenox    Disposition:  Home w/Family           ___________________________________________________    Attending Physician: Navarro Yang MD

## 2019-06-17 NOTE — PROGRESS NOTES
06/17/19     [facility] has accepted the patient and has a private room. Notified patient who is agreeable. Notified Hospitalist who has cleared her. Waiting for Belen RUFF decision. Notified by Ortho that patient not ready for discharge until tomorrow. Notified Admissions at [facility]. MITRA Walsh      3:36 PM  Patient deciding she wants [address] as he first preference. TC to [facility]. They can accept the patient for tomorrow.  has an appointment at 1:00 so would like her transferred in the morning. Therapy recommended ambulance. Asked for close to 10:00 am  but no time yet. CAll report to [phone] x [phone].

## 2019-06-17 NOTE — PROGRESS NOTES
Problem: Mobility Impaired (Adult and Pediatric)  Goal: *Acute Goals and Plan of Care (Insert Text)  Description  Physical Therapy Goals  Initiated 6/17/2019  1. Patient will move from supine to sit and sit to supine  in bed with minimal assistance/contact guard assist within 7 day(s). 2.  Patient will transfer from bed to chair and chair to bed with minimal assistance/contact guard assist using the least restrictive device within 7 day(s). 3.  Patient will perform sit to stand with minimal assistance/contact guard assist within 7 day(s). 4.  Patient will ambulate with minimal assistance/contact guard assist for 15 feet with the least restrictive device within 7 day(s). Outcome: Progressing Towards Goal   PHYSICAL THERAPY EVALUATION  Patient: Amador Padilla (80 y.o. female)  Date: 6/17/2019  Primary Diagnosis: Hip fracture (HCC) [S72.009A]  Hip fracture (HCC) [S72.009A]  Procedure(s) (LRB):  HIP PERCUTANEOUS PINNING CANNULATED SCREWS PAKO (Left) 1 Day Post-Op   Precautions:   Fall, Bed Alarm(TTWB LLE)    ASSESSMENT :  Based on the objective data described below, the patient presents with decreased bed mobility, generalized weakness, decreased transfers, decreased dynamic balance,  inability to ambulate, decreased attention and ability to stay focused, and high risk for falls following admission for GLF on 6/15/19 resulting in left hip fracture. Patient is POD #1 for screw fixation. She was received in bed working with OT. Patient is hard to reason with and argumentative at times. She moves extremely slowly and not always receptive to PT instructions. Eventually she did sit on edge of bed, then stood with rolling walker +2 mod assist. Bed was then removed and bedside commode placed underneath her and she sat for about 15 minutes passing gas.  She stood with +2 mod assist while OT performed hygiene needs, then returned to supine with +2 mod assist.No steps actually taken yet and patient has difficulty maintaining TTWB status to LLE. Patient talks a lot and needs cues for safety practices and to stay on task. She likes to do things her own way. BP dropped from supine to sitting on commode after extended time (she refused immediate supine to sit BP; see doc flow sheets). Patient was left in bed in  chair position with bed alarm in place. Patient lives with her  who she says is a hoarder. He left the room during PT. Patient will not be safe to return home at this time. Recommend rehab. Patient will benefit from skilled intervention to address the above impairments. Patient?s rehabilitation potential is considered to be Fair  Factors which may influence rehabilitation potential include:   ? None noted  ? Mental ability/status  ? Medical condition  ? Home/family situation and support systems  ? Safety awareness  ? Pain tolerance/management  ? Other:      PLAN :  Recommendations and Planned Interventions:  ?           Bed Mobility Training             ? Neuromuscular Re-Education  ? Transfer Training                   ? Orthotic/Prosthetic Training  ? Gait Training                         ? Modalities  ? Therapeutic Exercises           ? Edema Management/Control  ? Therapeutic Activities            ? Patient and Family Training/Education  ? Other (comment):    Frequency/Duration: Patient will be followed by physical therapy  daily to address goals. Discharge Recommendations: Rehab  Further Equipment Recommendations for Discharge: none      SUBJECTIVE:   Patient stated ? You make me feel calm. ?    OBJECTIVE DATA SUMMARY:   HISTORY:    Past Medical History:   Diagnosis Date    Benign essential HTN 10/5/2017    Cancer [facility])     breast    History of breast cancer 11/2004    right  - s/p lumpectomy w sentinal node 11/2004.   s/p XRT 1 mo.  could not tolerate tamoxifen    IBS (irritable bowel syndrome)     Ill-defined condition     left sciatica    Situational anxiety      with dementia    TMJ (temporomandibular joint syndrome)     UTI (urinary tract infection)     recurrent. last 9/2017     Past Surgical History:   Procedure Laterality Date    HX CHOLECYSTECTOMY      HX COLONOSCOPY  2016    states benign polyp - repeat 2019    HX KNEE ARTHROSCOPY Right 04/11/2017    partial knee replacement in [place]    HX ORTHOPAEDIC      bilat knee replacement    HX THYROIDECTOMY  2004    nodules, goiter     Prior Level of Function/Home Situation: independent  Personal factors and/or comorbidities impacting plan of care:  unable to assist, TTWB status    Home Situation  Home Environment: Private residence  # Steps to Enter: 2  Rails to [employer]: No  One/Two Story Residence: Two story  # of Interior Steps: 12  Interior Rails: Both  Lift Chair Available: No  Living Alone: No  Support Systems: Spouse/Significant Other/Partner, Family member(s)  Patient Expects to be Discharged to[de-identified] Skilled nursing facility  Current DME Used/Available at Home: Walker, rolling  Tub or Shower Type: Tub/Shower combination    EXAMINATION/PRESENTATION/DECISION MAKING:   Critical Behavior:  Neurologic State: Alert  Orientation Level: Oriented X4  Cognition: Decreased command following  Safety/Judgement: Decreased awareness of need for safety, Driving appropriateness, Fall prevention, Home safety  Hearing:   Auditory  Auditory Impairment: None  Hearing Aids/Status: Does not own  Skin:  not fully observed    Range Of Motion:  AROM: Generally decreased, functional   LLE less due to surgery                   Strength:    Strength: Generally decreased, functional   LLE less due to surgery                 Tone & Sensation:   Tone: Normal              Sensation: Intact               Coordination:  Coordination: Generally decreased, functional  Vision:   Acuity: Within Defined Limits  Functional Mobility:  Bed Mobility:  Rolling: Total assistance;Assist x2  Supine to Sit: Assist x2;Maximum assistance; Additional time  Sit to Supine: Assist x2; Moderate assistance; Additional time  Scooting: Total assistance;Assist x2  Transfers:  Sit to Stand: Assist x2; Moderate assistance  Stand to Sit: Moderate assistance; Additional time;Assist x2                    Balance:   Sitting: Intact  Standing: Impaired; With support(RW)  Standing - Static: Poor;Constant support  Standing - Dynamic : Poor;Constant support  Ambulation/Gait Training:  Distance (ft): (stood only X2; no steps taken yet)  Assistive Device: Walker, rolling;Gait belt  Ambulation - Level of Assistance: Assist x2(for standing only)              Left Side Weight Bearing: Toe touch                                           Therapeutic Exercises: Ankle pumps    Functional Measure:  Barthel Index:    Bathin  Bladder: 10  Bowels: 10  Groomin  Dressin  Feeding: 10  Mobility: 0  Stairs: 0  Toilet Use: 5  Transfer (Bed to Chair and Back): 5  Total: 45/100       Percentage of impairment   0%   1-19%   20-39%   40-59%   60-79%   80-99%   100%   Barthel Score 0-100 100 99-80 79-60 59-40 20-39 1-19   0     The Barthel ADL Index: Guidelines  1. The index should be used as a record of what a patient does, not as a record of what a patient could do. 2. The main aim is to establish degree of independence from any help, physical or verbal, however minor and for whatever reason. 3. The need for supervision renders the patient not independent. 4. A patient's performance should be established using the best available evidence. Asking the patient, friends/relatives and nurses are the usual sources, but direct observation and common sense are also important. However direct testing is not needed. 5. Usually the patient's performance over the preceding 24-48 hours is important, but occasionally longer periods will be relevant.   6. Middle categories imply that the patient supplies over 50 per cent of the effort. 7. Use of aids to be independent is allowed. Chirag Alvarado., Barthel, D.W. (6723). Functional evaluation: the Barthel Index. [address] (14)2. UTE Prabhakar, Neyda Sanchez.Thong., [place], [address] (1999). Measuring the change indisability after inpatient rehabilitation; comparison of the responsiveness of the Barthel Index and Functional Owen Measure. Journal of Neurology, Neurosurgery, and Psychiatry, 66(4), 335-545. Darius Wilson, NAGNES, CHRIS White, & Ofelia Hutson M.A. (2004.) Assessment of post-stroke quality of life in cost-effectiveness studies: The usefulness of the Barthel Index and the EuroQoL-5D. Quality of Life Research, 15, 066-61           Physical Therapy Evaluation Charge Determination   History Examination Presentation Decision-Making   MEDIUM  Complexity : 1-2 comorbidities / personal factors will impact the outcome/ POC  LOW Complexity : 1-2 Standardized tests and measures addressing body structure, function, activity limitation and / or participation in recreation  MEDIUM Complexity : Evolving with changing characteristics  Other outcome measures Barthel  MEDIUM      Based on the above components, the patient evaluation is determined to be of the following complexity level: LOW     Pain:  Pain Scale 1: Visual  Pain Intensity 1: 0  Pain Location 1: Back; Hip  Pain Orientation 1: Mid;Left  Activity Tolerance:   Fair; decreased blood pressure when up on commode  Please refer to the flowsheet for vital signs taken during this treatment. After treatment:   ?         Patient left in no apparent distress sitting up in chair  ? Patient left in no apparent distress in bed in chair position  ? Call bell left within reach  ? Nursing notified  ? Caregiver present  ?          Bed alarm activated    COMMUNICATION/EDUCATION:   The patient?s plan of care was discussed with: Occupational Therapist, Registered Nurse and Social Worker. ?         Fall prevention education was provided and the patient/caregiver indicated understanding. ? Patient/family have participated as able in goal setting and plan of care. ?         Patient/family agree to work toward stated goals and plan of care. ?         Patient understands intent and goals of therapy, but is neutral about his/her participation. ? Patient is unable to participate in goal setting and plan of care.     Thank you for this referral.  Claude Araujo, PT   Time Calculation: 40 mins

## 2019-06-17 NOTE — PROGRESS NOTES
NUTRITION   RD Screen      Pt seen for:      []  MST for     [x]   MD Consult    []        Supplements  []   PO intake check   []        Food Allergies  []   Food Preferences/tolerances    []        Rescreen  []   Education    []        Diet order clarification []   Other   []  LOS                          Nutrition Prescription:     Increase protein intake for 30 days, via supplements or dietary sources, in addition to balanced meals. Encourage adequate intake of meals and supplements    Assessment:   Information obtained from:   Patient    6/17: 68 y/o F admitted with hip fracture. MD consult for dietary supplements. PMH - HTN, breast CA, IBS. S/p L hip percutaneous screw fixation 6/16. Pt reports fair appetite with 50-75% meal intake. Pt reports good appetite PTA. Discussed increased protein post-surgery, pt agreeable to ONS. Will add Ensure HP x 1/d (160 kcal and 16 gm protein). CBW 86.2 kg.  lb. Weight Hx per EMR shows recent weight stability. No note of GI distress. LBM 6/15, on bowel regimen. Will f/u per protocol. Labs Reviewed: [x]  Medications Reviewed: [x]     Cultural, Religion and ethnic food preferences:   [x]  None   []  Identified and addressed    Diet:  Regular    PO Intake: []           Good     [x]           Fair      []           Poor     No data found. Wt Readings from Last 5 Encounters:   06/15/19 86.2 kg (190 lb)   01/30/19 84.8 kg (187 lb)   11/15/18 84.5 kg (186 lb 3.2 oz)   04/19/18 86.1 kg (189 lb 12.8 oz)   11/13/17 85 kg (187 lb 6.3 oz)   ]    Body mass index is 31.62 kg/m². Skin: hip L incision  BM: 6/15  ABD: WDL    Estimated Daily Nutrition Requirements:  Kcals/day: 2410 Kcals/day(REE 1348 x 1.3)  Protein: 105 g(103 - 121 (1.2 - 1.4 gm/kg))  Fluid:  1752    (1mL/kcal)  Based On:  [place] [address] Jeor  Weight Used: Actual wt    Weight Changes:   []   Loss  []   Gain  [x]   Stable    Nutrition Problems Identified  [x]     None  []     Specified food preferences   [] Dislikes supplements              []     Allergies  []     Difficulty chewing     []     Dentition   []     Nausea/Vomiting  []    Constipation  []    Diarrhea    Nutrition Diagnosis:      Increased nutrient needs related to increased protein needs with wound healing evidenced by impaired skin integrity with L hip incision.     Intervention:   [x]    Obtained/adjusted food preferences/tolerances and/or snacks options   []    Dislikes supplements will try a substitution   []    Modify diet for food allergies  []    Adjust texture due to difficulty chewing   []    Encourage Fresh Fruit, Activia yogurt, fluid   [x]    Educated patient  []    Rescreen per screening protocol  []    Add Supplements    Goal: patient to consume 1 protein item with meals and 50% of meals and snacks in the next 5-7 days    Monitoring/Evaluation:   Education & Discharge Needs  [x] Nutrition related discharge needs addressed:   [x] Supplements (on d/c instruction &/or coupons provided)    [x] Education   [] No nutrition related discharge needs at this time    Monitor: intake, wt and wound healing    Rescreen: [x]  At Nutrition Risk          []  Not [facility] [address] [facility], rescreen per screening protocol      Fernando Hester, [address] [place] [address]  Pager [phone]   Office [phone]

## 2019-06-17 NOTE — PROGRESS NOTES
Patient has been difficult to please over night shift, patient is demanding and fears pain, patient has been repositioned several times, but refuses to sit with [facility], she states she can not with stand the pain, patient attempts to eat laying flat, patient educated on risk of choking, still refuses to comply,  she also has refused her SCDS, ice therapy, medications, oral and non oral, patient needs lots of encouragement, teaching, and education, Patient refused to dangle and to sit on side of bed if nurse was not present patient often refused repositioning by turn time and care staff.

## 2019-06-17 NOTE — PROGRESS NOTES
Orthopaedic Progress Note  Post Op day: 1 Day Post-Op    2019 3:28 PM     Patient: Sandra Heart MRN: [id number]  SSN: [id number]    YOB: 1940  Age: 69 y.o. Sex: female      Admit date:  6/15/2019  Date of Surgery:  2019   Procedures:  Procedure(s):  HIP PERCUTANEOUS PINNING CANNULATED SCREWS PAKO  Admitting Physician:  Eliecer Lerner MD   Surgeon:  María Elena Puente) and Role:     * Dana Salazar MD - Primary    Consulting Physician(s): Treatment Team: Attending Provider: Deya Olson MD; Consulting Provider: Sherrilyn Canavan, MD; Consulting Provider: ARNOLD Carlos; Surgeon: Dana Salazar MD; Utilization Review: Rachel Leal; Care Manager: Rome Solis; Utilization Review: Gracie Jacobson    SUBJECTIVE:     Sandra Heart is a 69 y.o. female is 1 Day Post-Op s/p Procedure(s):  HIP PERCUTANEOUS PINNING CANNULATED SCREWS PAKO with an appropriate level of post-operative pain. No complaints of nausea, vomiting, dizziness, lightheadedness, chest pain, or shortness of breath. OBJECTIVE:       Physical Exam:  General: Alert, cooperative, no distress. Respiratory: Respirations unlabored  Neurological:  Neurovascular exam within normal limits. Motor: + DF/PF. Musculoskeletal: Calves soft, supple, non-tender upon palpation. Dressing/Wound:  Clean, dry and intact. No significant erythema or swelling.       Vital Signs:        Patient Vitals for the past 8 hrs:   BP Temp Pulse Resp SpO2   19 1030 136/78 98 °F (36.7 °C) 76 15 92 %   19 0915 117/71  94  94 %   19 0842 160/83  81  96 %                                          Temp (24hrs), Av.2 °F (36.8 °C), Min:98 °F (36.7 °C), Max:98.4 °F (36.9 °C)      Labs:        Recent Labs     19  0557 19  0346   HCT  --  41.8   HGB 12.5 13.1     Lab Results   Component Value Date/Time    Sodium 136 2019 05:57 AM    Potassium 4.4 2019 05:57 AM Chloride 104 06/17/2019 05:57 AM    CO2 29 06/17/2019 05:57 AM    Glucose 120 (H) 06/17/2019 05:57 AM    BUN 17 06/17/2019 05:57 AM    Creatinine 0.63 06/17/2019 05:57 AM    Calcium 9.3 06/17/2019 05:57 AM       PT/OT:        Gait  Ambulation - Level of Assistance: Assist x2(for standing only)  Distance (ft): (stood only X2; no steps taken yet)  Assistive Device: Walker, rolling, Gait belt       Patient mobility  Bed Mobility Training  Rolling: Total assistance, Assist x2  Supine to Sit: Assist x2, Maximum assistance, Additional time  Sit to Supine: Assist x2, Moderate assistance, Additional time  Scooting: Total assistance, Assist x2  Transfer Training  Sit to Stand: Assist x2, Moderate assistance  Stand to Sit: Moderate assistance, Additional time, Assist x2  Bed to Chair: Assist x2, Additional time, Moderate assistance      Gait Training  Assistive Device: Walker, rolling, Gait belt  Ambulation - Level of Assistance: Assist x2(for standing only)  Distance (ft): (stood only X2; no steps taken yet)   Weight Bearing Status  Left Side Weight Bearing: Toe touch        ASSESSMENT / PLAN:   Active Problems:    Benign essential HTN (10/5/2017)      IBS (irritable bowel syndrome) ()      Hip fracture (HCC) (6/15/2019)      Leukocytosis (6/15/2019)                    Pain Control: Adequate with oral agents and PRN IV narcotics   DVT Prophylaxis: Lovenox daily, SCDs    Therapy/ Weight bearing: WBAT   Wound/ incisional issues: CDI   Medical concerns: HTN/ IBS/ leukocytosis   Disposition: Rehab tomorrow am.  To follow up with Shana Arevalo in 2 weeks.    Lovenox 40 mg SQ x 4 weeks     Signed By:  Stephen Ortega NP    Orthopedic Trauma Nurse Practitioner  [facility]

## 2019-06-18 VITALS
RESPIRATION RATE: 19 BRPM | SYSTOLIC BLOOD PRESSURE: 125 MMHG | HEART RATE: 93 BPM | WEIGHT: 186.07 LBS | DIASTOLIC BLOOD PRESSURE: 86 MMHG | OXYGEN SATURATION: 93 % | BODY MASS INDEX: 31 KG/M2 | HEIGHT: 65 IN | TEMPERATURE: 98.3 F

## 2019-06-18 LAB — HGB BLD-MCNC: 12.5 G/DL (ref 11.5–16)

## 2019-06-18 PROCEDURE — 85018 HEMOGLOBIN: CPT

## 2019-06-18 PROCEDURE — 94760 N-INVAS EAR/PLS OXIMETRY 1: CPT

## 2019-06-18 PROCEDURE — 36415 COLL VENOUS BLD VENIPUNCTURE: CPT

## 2019-06-18 PROCEDURE — 74011250636 HC RX REV CODE- 250/636: Performed by: ORTHOPAEDIC SURGERY

## 2019-06-18 PROCEDURE — 74011250637 HC RX REV CODE- 250/637: Performed by: ORTHOPAEDIC SURGERY

## 2019-06-18 RX ORDER — FERROUS SULFATE, DRIED 160(50) MG
1 TABLET, EXTENDED RELEASE ORAL
Qty: 30 TAB | Refills: 0 | Status: SHIPPED
Start: 2019-06-18 | End: 2020-10-05 | Stop reason: ALTCHOICE

## 2019-06-18 RX ORDER — ENOXAPARIN SODIUM 100 MG/ML
40 INJECTION SUBCUTANEOUS DAILY
Qty: 11.2 ML | Refills: 0 | Status: SHIPPED
Start: 2019-06-19 | End: 2019-07-17

## 2019-06-18 RX ORDER — POLYETHYLENE GLYCOL 3350 17 G/17G
17 POWDER, FOR SOLUTION ORAL DAILY
Qty: 10 PACKET | Refills: 0 | Status: SHIPPED
Start: 2019-06-19 | End: 2019-07-26 | Stop reason: ALTCHOICE

## 2019-06-18 RX ORDER — OXYCODONE HYDROCHLORIDE 5 MG/1
5 TABLET ORAL
Qty: 10 TAB | Refills: 0 | Status: SHIPPED | OUTPATIENT
Start: 2019-06-18 | End: 2019-06-21

## 2019-06-18 RX ORDER — AMOXICILLIN 250 MG
1 CAPSULE ORAL 2 TIMES DAILY
Qty: 60 TAB | Refills: 0 | Status: SHIPPED
Start: 2019-06-18 | End: 2019-07-26 | Stop reason: ALTCHOICE

## 2019-06-18 RX ORDER — TRAMADOL HYDROCHLORIDE 50 MG/1
50 TABLET ORAL
Qty: 10 TAB | Refills: 0 | Status: SHIPPED | OUTPATIENT
Start: 2019-06-18 | End: 2019-06-21

## 2019-06-18 RX ADMIN — MORPHINE SULFATE 2 MG: 4 INJECTION, SOLUTION INTRAMUSCULAR; INTRAVENOUS at 00:28

## 2019-06-18 RX ADMIN — ENOXAPARIN SODIUM 40 MG: 40 INJECTION SUBCUTANEOUS at 08:06

## 2019-06-18 RX ADMIN — OXYCODONE HYDROCHLORIDE 5 MG: 5 TABLET ORAL at 06:03

## 2019-06-18 RX ADMIN — TRAMADOL HYDROCHLORIDE 50 MG: 50 TABLET, FILM COATED ORAL at 09:46

## 2019-06-18 RX ADMIN — Medication 10 ML: at 06:06

## 2019-06-18 RX ADMIN — OYSTER SHELL CALCIUM WITH VITAMIN D 1 TABLET: 500; 200 TABLET, FILM COATED ORAL at 08:06

## 2019-06-18 RX ADMIN — SENNOSIDES, DOCUSATE SODIUM 1 TABLET: 50; 8.6 TABLET, FILM COATED ORAL at 08:06

## 2019-06-18 RX ADMIN — Medication 10 ML: at 00:13

## 2019-06-18 RX ADMIN — POLYETHYLENE GLYCOL 3350 17 G: 17 POWDER, FOR SOLUTION ORAL at 08:06

## 2019-06-18 NOTE — PROGRESS NOTES
[facility] Phys[facility]  [address] [place] [address] Andres Weber, [address] [facility]  [phone]      Certificate of Illness      NAME: Julianne Ram   :  1940  MRM:  [id number]       To whom it may concern,      Ms. Nhung Franklin was under the co-management and care of myself, Dr. Mona Starr, and her surgeon, Dr. Isabel Obregon, for a hip fracture. Due to the nature of her injury and the required recovery of intensive inpatient rehabilitation, it would not be feasible and would be ill-advised for her to travel or engage in rigorous activity for the foreseeable future. As such, I request that you excuse her from any pre-arranged obligations through the end of 2019. Please feel free to contact us if further information is required. Respectfully,    Luisana Walter DO  Hospitalist  [facility]    Electronic copy to be sent to patient at: [email or web address]          ___________________________________________________    Attending Physician: Greg Rhodes DO

## 2019-06-18 NOTE — DISCHARGE SUMMARY
Physician Discharge Summary     Patient ID:  Tiffanie Salinas  [id number]  11 y.o.  1940    Admit date: 6/15/2019    Discharge date and time: 6/18/2019    Admission Diagnoses: Hip fracture ([facility] [S72.009A]  Hip fracture ([facility] [S72.009A]    Discharge Diagnoses:    Principal Diagnosis   Left hip fracture required pinning                                            Other Diagnoses  Active Problems:    Benign essential HTN (10/5/2017)      IBS (irritable bowel syndrome) ()      Hip fracture ([facility] (6/15/2019)      Leukocytosis (6/15/2019)         Hospital Course:     1.  LT hip fracture ([facility] (6/15/2019) after GLF. S/p ORIP on 6/16. To inpatient rehab, rx for lovenox x 28 days + pain management.      2.  Benign essential HTN (10/5/2017). Not on medication. Monitor      3.  IBS (irritable bowel syndrome). Symptomatic treatment. Robust bowel regimen while requiring narcotics      4.  Leukocytosis (6/15/2019). Likely reactive. Resolved.         PCP: Edmund Stephen MD    Consults: Orthopedic Surgery    Significant Diagnostic Studies: See Hospital Course    Discharged home in improved condition. Discharge Exam:    Visit Vitals  /86   Pulse 93   Temp 98.3 °F (36.8 °C)   Resp 19   Ht 5' 5\" (1.651 m)   Wt 84.4 kg (186 lb 1.1 oz)   SpO2 93%   BMI 30.96 kg/m²     Physical Exam:    Gen: Well-developed, well-nourished, in no acute distress  HEENT:  Pink conjunctivae, hearing intact to voice, moist mucous membranes  Neck: Supple  Resp: No accessory muscle use, clear breath sounds without wheezes rales or rhonchi  Card: No murmurs, normal S1, S2 without thrills or peripheral edema  Abd:  Soft, non-tender, non-distended, normoactive bowel sounds are present  Musc: No cyanosis  Skin: No rashes  Neuro:   Face symmetric, tongue midline, speech fluent,  strength is 5/5 bilaterally, hip flexion is 5/5 bilaterally, follows commands appropriately  Psych:  Good insight, oriented to person, place and time, alert      Disposition: Rehab    Patient Instructions:   Current Discharge Medication List      START taking these medications    Details   calcium-vitamin D (OYSTER SHELL) 500 mg(1,250mg) -200 unit per tablet Take 1 Tab by mouth three (3) times daily (with meals). Qty: 30 Tab, Refills: 0      enoxaparin (LOVENOX) 40 mg/0.4 mL 0.4 mL by SubCUTAneous route daily for 28 days. Qty: 11.2 mL, Refills: 0      oxyCODONE IR (ROXICODONE) 5 mg immediate release tablet Take 1 Tab by mouth every six (6) hours as needed for Pain for up to 3 days. Max Daily Amount: 20 mg.  Qty: 10 Tab, Refills: 0    Associated Diagnoses: Closed fracture of hip, unspecified laterality, initial encounter ([facility]      senna-docusate (PERICOLACE) 8.6-50 mg per tablet Take 1 Tab by mouth two (2) times a day. Qty: 60 Tab, Refills: 0      polyethylene glycol (MIRALAX) 17 gram packet Take 1 Packet by mouth daily. Qty: 10 Packet, Refills: 0      traMADol (ULTRAM) 50 mg tablet Take 1 Tab by mouth every six (6) hours as needed for Pain for up to 3 days. Max Daily Amount: 200 mg.   Qty: 10 Tab, Refills: 0    Associated Diagnoses: Closed fracture of hip, unspecified laterality, initial encounter ([facility]           Activity: Activity as tolerated  Diet: Resume previous diet  Wound Care: As directed    Follow-up with Ortho in 2 weeks    Signed:  Jeremie Moise DO  6/18/2019  8:10 AM    Greater than 30 mins was spent in coordination, counseling, and execution of this patient's discharge

## 2019-06-18 NOTE — DISCHARGE INSTRUCTIONS
Patient Discharge Instructions    Anirudh Regan / [id number] : 1940    Admitted 6/15/2019 Discharged: 2019     Primary Diagnoses  Problem List as of 2019 Date Reviewed: 6/15/2019          Codes Class Noted - Resolved    Hip fracture ([facility] ICD-10-CM: Z40.790T  ICD-9-CM: 820.8  6/15/2019 - Present        Leukocytosis ICD-10-CM: T44.311  ICD-9-CM: 288.60  6/15/2019 - Present        TMJ (temporomandibular joint syndrome) ICD-10-CM: M26.609  ICD-9-CM: 524.60  Unknown - Present        UTI (urinary tract infection) ICD-10-CM: N39.0  ICD-9-CM: 599.0  Unknown - Present    Overview Signed 10/5/2017 11:43 AM by Elvie Tiwari MD     recurrent. last 2017             Benign essential HTN ICD-10-CM: I10  ICD-9-CM: 401.1  10/5/2017 - Present        IBS (irritable bowel syndrome) ICD-10-CM: K58.9  ICD-9-CM: 564.1  Unknown - Present        Situational anxiety ICD-10-CM: F41.8  ICD-9-CM: 300.09  Unknown - Present    Overview Signed 10/5/2017 11:45 AM by Elvie Tiwari MD      with dementia             History of breast cancer ICD-10-CM: Z85.3  ICD-9-CM: V10.3  2004 - Present    Overview Signed 11/15/2018  3:45 PM by Elvie Tiwari MD     right  - s/p lumpectomy w sentinal node 2004. s/p XRT 1 mo.  could not tolerate tamoxifen             RESOLVED: Malignant neoplasm of right female breast ([facility] ICD-10-CM: C50.911  ICD-9-CM: 174.9  10/5/2017 - 11/15/2018        RESOLVED: Breast cancer ([facility] ICD-10-CM: C50.919  ICD-9-CM: 174.9  2004 - 10/5/2017    Overview Signed 10/5/2017 12:02 PM by Elvie Tiwari MD     right  - s/p lumpectomy w sentinal node 2004. s/p XRT 1 mo.  could not tolerate tamoxifen                   Take Home Medications       · It is important that you take the medication exactly as they are prescribed. · Keep your medication in the bottles provided by the pharmacist and keep a list of the medication names, dosages, and times to be taken in your wallet.    · Do not take other medications without consulting your doctor. What to do at Home    Recommended diet: Resume previous diet    Recommended activity: PT/OT Eval and Treat    If you experience worsening symptoms, please follow up with nearest ER. Follow-up with your PCP in 2 weeks        Information obtained by :  I understand that if any problems occur once I am at home I am to contact my physician. I understand and acknowledge receipt of the instructions indicated above.                                                                                                                                            Physician's or R.N.'s Signature                                                                  Date/Time                                                                                                                                              Patient or Representative Signature                                                          Date/Time

## 2019-06-18 NOTE — PROGRESS NOTES
6/18/2019 10:36 AM Nurse navigator for pt's PCP was notified of pt's discharge today and plan. 6/18/2019  10:00 AM Pt's avs, mars, scripts, bundle form and updates were faxed to [facility] at [phone]. Ambulance packet completed on hard chart with scripts and bundle form. Copy of Certificate of Illness printed, emailed to pt and pt was also provided a paper copy. CM called and lvm with Kumar Sahu in admissions at [address] [facility], notifying pt is in the [employer] Bundle. 6/18/2019 8:28 AM Ambulance transport is arranged for 11am for pt going to [facility]. Nursing please call report to [address] [address] at [phone]. CM spoke with Kumar Sahu in admissions at [address] who confirmed they can accept pt today at 11am into a private room. Met with pt who was agreeable, 2nd IMM letter signed. Pt confirmed she will notify her  of transport time. CM will follow.  Angélica Hutchinson, PADMINIW

## 2019-06-19 ENCOUNTER — PATIENT OUTREACH (OUTPATIENT)
Dept: INTERNAL MEDICINE CLINIC | Age: 79
End: 2019-06-19

## 2019-06-19 NOTE — PROGRESS NOTES
Transition of Care Coordination/Hospital to Post Acute Facility:     Date/Time:  2019 6:17 PM    Patient was admitted to [facility] on 6/15/19 for treatment of Left hip fracture required pinning. Patient was discharged 19 to [address] [place] [address]  for continuation of care. Inpatient RRAT score: 7    Top Challenges reviewed    D/c to SNF  Ortho Bundle patient ACP patient         Method of communication with care team : phone     Nurse Navigator(NN) spoke with intake to provide introduction to self and explanation of the Nurse Navigator Role. Verified name and  as patient identifiers. Confirmed pt is admitted, but was not able to speak with CN. Message left to return call    ACP:   Does the patient have a current ACP (including DDNR):  no  Does the post acute facility have a copy of the patients ACP:  N/A    Medication(s):   New Medications at Discharge:     calcium-vitamin D (OYSTER SHELL) 500 mg(1,250mg) -200 unit per tablet Take 1 Tab by mouth three (3) times daily (with meals). Qty: 30 Tab, Refills: 0       enoxaparin (LOVENOX) 40 mg/0.4 mL 0.4 mL by SubCUTAneous route daily for 28 days. Qty: 11.2 mL, Refills: 0       oxyCODONE IR (ROXICODONE) 5 mg immediate release tablet Take 1 Tab by mouth every six (6) hours as needed for Pain for up to 3 days. Max Daily Amount: 20 mg.  Qty: 10 Tab, Refills: 0     Associated Diagnoses: Closed fracture of hip, unspecified laterality, initial encounter ([facility]       senna-docusate (PERICOLACE) 8.6-50 mg per tablet Take 1 Tab by mouth two (2) times a day. Qty: 60 Tab, Refills: 0       polyethylene glycol (MIRALAX) 17 gram packet Take 1 Packet by mouth daily. Qty: 10 Packet, Refills: 0       traMADol (ULTRAM) 50 mg tablet Take 1 Tab by mouth every six (6) hours as needed for Pain for up to 3 days. Max Daily Amount: 200 mg.   Qty: 10 Tab, Refills: 0       Changed Medications at Discharge: n/a  Discontinued Medications at Discharge: n/a PCP/Specialist follow up: No future appointments. Opportunity to ask questions was provided. Contact information was provided for future reference or further questions. Will continue to monitor.

## 2019-06-26 ENCOUNTER — PATIENT OUTREACH (OUTPATIENT)
Dept: INTERNAL MEDICINE CLINIC | Age: 79
End: 2019-06-26

## 2019-06-26 NOTE — PROGRESS NOTES
Goals      Attends follow-up appointments as directed.       6/26/19 call placed to [address] [place] [address] & confirmed pt is still admitted to rehab - TSB

## 2019-07-09 ENCOUNTER — PATIENT OUTREACH (OUTPATIENT)
Dept: INTERNAL MEDICINE CLINIC | Age: 79
End: 2019-07-09

## 2019-07-09 NOTE — PROGRESS NOTES
Goals        Post Hospitalization     Attends follow-up appointments as directed. 7/9/2019  Patient home from [facility]. She was discharged on 7/3/2019. She reports she is doing will with no problems. She is being followed by At [address] Amrita Hebert is her nurse. 21 [phone], she has SN, PT, and OT. NN spoke to [facility] and reported patient is well and has no complaints. She is not taking any pain medication. She is afebrile. Sutures are out and she will follow up with surgeon this week. Patient declined to schedule a visit at this time but will call back to schedule at a later time.  [facility]     6/26/19 call placed to [phone] & confirmed pt is still admitted to rehab - TSB

## 2019-07-10 ENCOUNTER — PATIENT OUTREACH (OUTPATIENT)
Dept: INTERNAL MEDICINE CLINIC | Age: 79
End: 2019-07-10

## 2019-07-11 RX ORDER — ASPIRIN 325 MG
TABLET, DELAYED RELEASE (ENTERIC COATED) ORAL
Qty: 8 CAP | Refills: 1 | Status: SHIPPED | OUTPATIENT
Start: 2019-07-11 | End: 2019-07-26 | Stop reason: ALTCHOICE

## 2019-07-12 ENCOUNTER — TELEPHONE (OUTPATIENT)
Dept: INTERNAL MEDICINE CLINIC | Age: 79
End: 2019-07-12

## 2019-07-12 NOTE — TELEPHONE ENCOUNTER
Kayley Keene stated At [facility] PT is following pt at home for 3 weeks at twice a week, however, the pt want to decrease the to just once a week. Please confirm if this is ok. Thanks.

## 2019-07-16 ENCOUNTER — TELEPHONE (OUTPATIENT)
Dept: INTERNAL MEDICINE CLINIC | Age: 79
End: 2019-07-16

## 2019-07-16 NOTE — TELEPHONE ENCOUNTER
Jesus Hilliard [facility].S. "[employer]  Phone Number: [phone]         Pt requesting physical with Dr. Genaro Ramos. Advised that he does not have any available appts through end of year, offered with NP Graciela Randall, pt declined. Pt requesting to be placed on \"waitlist,\" advised pt I didn't think there was a waitlist but that I would forward her request. Thanks!   ----- Message from [address] [place] [address], [facility] sent at 7/15/2019  5:54 PM EDT -----     To: Histros   From: [employer]   Subject: Re: Appointment   Merissa Mccord,   As I didn't get a wellness appointment last year and I have just been in the Hospital for a fractured   hip, I would like my name to be put on a wait list.  I am available to take an appt. any day or time. Thank you, [employer]   Previous Messages        ----- Message -----   From: [employer]   Sent: 7/12/2019  2:00 PM EDT   To: [employer]   Subject: RE: Appointment Marianela Benitez,     Dr. Genaro Ramos is fully booked on physicals through the end of the year. Would you like to schedule an appointment with KATHY Randall instead? Please let me know. Thanks!       ----- Message -----   Mckayla Ruiz From: [employer]      Sent: 7/12/2019  1:50 PM EDT        To: Patient Appointment Schedule Request Mailing List   Subject: Appointment Request     Appointment Request From: [facility]     With Provider: Wesley Polo MD [[facility]]     Preferred Date Range: 7/31/2019 - 8/31/2019     Preferred Times: Any time     Reason for visit: Request an Appointment     Comments:   wellness appt.

## 2019-07-19 ENCOUNTER — PATIENT OUTREACH (OUTPATIENT)
Dept: INTERNAL MEDICINE CLINIC | Age: 79
End: 2019-07-19

## 2019-07-19 NOTE — PROGRESS NOTES
Goals        Post Hospitalization     Attends follow-up appointments as directed. 7/18/2019 Patient reports she is doing ok, but tired today from doing laundry. Hospital/SNF follow up appointment scheduled  with PCP on 7/26/2019. She will attend. [facility]    7/9/2019  Patient home from [facility]. She was discharged on 7/3/2019. She reports she is doing will with no problems. She is being followed by [facility] [address] Amrita Hebert is her nurse. 21 , she has SN, PT, and OT. NN spoke to Liz Jameson and reported patient is well and has no complaints. She is not taking any pain medication. She is afebrile. Sutures are out and she will follow up with surgeon this week. Patient declined to schedule a visit at this time but will call back to schedule at a later time.  [facility]     6/26/19 call placed to [address] [place] [address] & confirmed pt is still admitted to rehab - TSB

## 2019-07-26 ENCOUNTER — PATIENT OUTREACH (OUTPATIENT)
Dept: INTERNAL MEDICINE CLINIC | Age: 79
End: 2019-07-26

## 2019-07-26 ENCOUNTER — OFFICE VISIT (OUTPATIENT)
Dept: INTERNAL MEDICINE CLINIC | Age: 79
End: 2019-07-26

## 2019-07-26 VITALS
TEMPERATURE: 97.5 F | BODY MASS INDEX: 31.16 KG/M2 | OXYGEN SATURATION: 96 % | SYSTOLIC BLOOD PRESSURE: 148 MMHG | HEART RATE: 68 BPM | HEIGHT: 65 IN | WEIGHT: 187 LBS | RESPIRATION RATE: 18 BRPM | DIASTOLIC BLOOD PRESSURE: 88 MMHG

## 2019-07-26 DIAGNOSIS — S72.002D CLOSED FRACTURE OF LEFT HIP WITH ROUTINE HEALING, SUBSEQUENT ENCOUNTER: Primary | ICD-10-CM

## 2019-07-26 DIAGNOSIS — E55.9 VITAMIN D INSUFFICIENCY: ICD-10-CM

## 2019-07-26 DIAGNOSIS — M80.00XA AGE-RELATED OSTEOPOROSIS WITH CURRENT PATHOLOGICAL FRACTURE, INITIAL ENCOUNTER: ICD-10-CM

## 2019-07-26 DIAGNOSIS — I10 BENIGN ESSENTIAL HTN: ICD-10-CM

## 2019-07-26 RX ORDER — ENOXAPARIN SODIUM 100 MG/ML
40 INJECTION SUBCUTANEOUS
COMMUNITY
End: 2019-07-26 | Stop reason: ALTCHOICE

## 2019-07-26 RX ORDER — OXYCODONE HYDROCHLORIDE 5 MG/1
5 TABLET ORAL
COMMUNITY
End: 2019-07-26 | Stop reason: ALTCHOICE

## 2019-07-26 RX ORDER — TRAMADOL HYDROCHLORIDE 50 MG/1
50 TABLET ORAL
COMMUNITY
End: 2019-07-26 | Stop reason: ALTCHOICE

## 2019-07-26 NOTE — PATIENT INSTRUCTIONS
Body Mass Index: Care Instructions  Your Care Instructions    Body mass index (BMI) can help you see if your weight is raising your risk for health problems. It uses a formula to compare how much you weigh with how tall you are. · A BMI lower than 18.5 is considered underweight. · A BMI between 18.5 and 24.9 is considered healthy. · A BMI between 25 and 29.9 is considered overweight. A BMI of 30 or higher is considered obese. If your BMI is in the normal range, it means that you have a lower risk for weight-related health problems. If your BMI is in the overweight or obese range, you may be at increased risk for weight-related health problems, such as high blood pressure, heart disease, stroke, arthritis or joint pain, and diabetes. If your BMI is in the underweight range, you may be at increased risk for health problems such as fatigue, lower protection (immunity) against illness, muscle loss, bone loss, hair loss, and hormone problems. BMI is just one measure of your risk for weight-related health problems. You may be at higher risk for health problems if you are not active, you eat an unhealthy diet, or you drink too much alcohol or use tobacco products. Follow-up care is a key part of your treatment and safety. Be sure to make and go to all appointments, and call your doctor if you are having problems. It's also a good idea to know your test results and keep a list of the medicines you take. How can you care for yourself at home? · Practice healthy eating habits. This includes eating plenty of fruits, vegetables, whole grains, lean protein, and low-fat dairy. · If your doctor recommends it, get more exercise. Walking is a good choice. Bit by bit, increase the amount you walk every day. Try for at least 30 minutes on most days of the week. · Do not smoke. Smoking can increase your risk for health problems. If you need help quitting, talk to your doctor about stop-smoking programs and medicines. These can increase your chances of quitting for good. · Limit alcohol to 2 drinks a day for men and 1 drink a day for women. Too much alcohol can cause health problems. If you have a BMI higher than 25  · Your doctor may do other tests to check your risk for weight-related health problems. This may include measuring the distance around your waist. A waist measurement of more than 40 inches in men or 35 inches in women can increase the risk of weight-related health problems. · Talk with your doctor about steps you can take to stay healthy or improve your health. You may need to make lifestyle changes to lose weight and stay healthy, such as changing your diet and getting regular exercise. If you have a BMI lower than 18.5  · Your doctor may do other tests to check your risk for health problems. · Talk with your doctor about steps you can take to stay healthy or improve your health. You may need to make lifestyle changes to gain or maintain weight and stay healthy, such as getting more healthy foods in your diet and doing exercises to build muscle. Where can you learn more? Go to http://abdi-saroj.info/. Enter S176 in the search box to learn more about \"Body Mass Index: Care Instructions. \"  Current as of: October 13, 2016  Content Version: 11.4  © 2024-5651 [employer]. Care instructions adapted under license by [employer] (which disclaims liability or warranty for this information). If you have questions about a medical condition or this instruction, always ask your healthcare professional. [address] any warranty or liability for your use of this information.

## 2019-07-26 NOTE — PROGRESS NOTES
Goals        Post Hospitalization     Attends follow-up appointments as directed. 7/26/2019 Patient in for [address] She is doing well, still using walker. She feels she is not quite 100% but much improved. [facility]    7/18/2019 Patient reports she is doing ok, but tired today from doing laundry. Hospital/SNF follow up appointment scheduled  with PCP on 7/26/2019. She will attend. [facility]    7/9/2019  Patient home from [facility]. She was discharged on 7/3/2019. She reports she is doing will with no problems. She is being followed by [facility] [address] Amrita Hebert is her nurse. 21 , she has SN, PT, and OT. NN spoke to Lavon Mcgovern and reported patient is well and has no complaints. She is not taking any pain medication. She is afebrile. Sutures are out and she will follow up with surgeon this week. Patient declined to schedule a visit at this time but will call back to schedule at a later time.  [facility]     6/26/19 call placed to [address] [place] [address] & confirmed pt is still admitted to rehab - TSB

## 2019-07-26 NOTE — Clinical Note
Janie Pierre, please let her know that I sent in generic Fosamax to treat her osteoporosis. This contributed to her hip fracture and I think it is very important that we try to boost up her bone strength. Continue taking vitamin D and calcium. Repeat bone density in 2 years. If she has any concerns, please make an appointment to discuss other options to treat her osteoporosis. It is very important that we treated and  vitamin D and calcium are not enough.

## 2019-07-28 RX ORDER — ALENDRONATE SODIUM 70 MG/1
70 TABLET ORAL
Qty: 4 TAB | Refills: 5 | Status: SHIPPED | OUTPATIENT
Start: 2019-07-28 | End: 2020-10-05 | Stop reason: ALTCHOICE

## 2019-07-29 NOTE — PROGRESS NOTES
Advised of new medicine sent to pharmacy , continue with Vit. D and calcium , make appointment to discuss further treatments for Osteoporosis, pt is in  agreement.

## 2019-08-14 ENCOUNTER — OFFICE VISIT (OUTPATIENT)
Dept: INTERNAL MEDICINE CLINIC | Age: 79
End: 2019-08-14

## 2019-08-14 ENCOUNTER — TELEPHONE (OUTPATIENT)
Dept: INTERNAL MEDICINE CLINIC | Age: 79
End: 2019-08-14

## 2019-08-14 VITALS
BODY MASS INDEX: 30.99 KG/M2 | HEIGHT: 65 IN | SYSTOLIC BLOOD PRESSURE: 150 MMHG | DIASTOLIC BLOOD PRESSURE: 92 MMHG | OXYGEN SATURATION: 96 % | WEIGHT: 186 LBS | RESPIRATION RATE: 18 BRPM | TEMPERATURE: 98.3 F | HEART RATE: 73 BPM

## 2019-08-14 DIAGNOSIS — K59.00 CONSTIPATION, UNSPECIFIED CONSTIPATION TYPE: ICD-10-CM

## 2019-08-14 DIAGNOSIS — R10.9 RIGHT FLANK PAIN: Primary | ICD-10-CM

## 2019-08-14 DIAGNOSIS — Z87.442 HISTORY OF NEPHROLITHIASIS: ICD-10-CM

## 2019-08-14 DIAGNOSIS — R31.29 MICROSCOPIC HEMATURIA: ICD-10-CM

## 2019-08-14 DIAGNOSIS — F41.8 SITUATIONAL ANXIETY: ICD-10-CM

## 2019-08-14 DIAGNOSIS — S72.002D CLOSED FRACTURE OF LEFT HIP WITH ROUTINE HEALING, SUBSEQUENT ENCOUNTER: ICD-10-CM

## 2019-08-14 LAB
BILIRUB UR QL STRIP: NEGATIVE
GLUCOSE UR-MCNC: NEGATIVE MG/DL
KETONES P FAST UR STRIP-MCNC: NEGATIVE MG/DL
PH UR STRIP: 7 [PH] (ref 4.6–8)
PROT UR QL STRIP: NEGATIVE
SP GR UR STRIP: 1.01 (ref 1–1.03)
UA UROBILINOGEN AMB POC: NORMAL (ref 0.2–1)
URINALYSIS CLARITY POC: CLEAR
URINALYSIS COLOR POC: YELLOW
URINE BLOOD POC: NORMAL
URINE LEUKOCYTES POC: NEGATIVE
URINE NITRITES POC: NEGATIVE

## 2019-08-14 RX ORDER — SULFAMETHOXAZOLE AND TRIMETHOPRIM 800; 160 MG/1; MG/1
TABLET ORAL
COMMUNITY
End: 2019-08-14 | Stop reason: ALTCHOICE

## 2019-08-14 NOTE — TELEPHONE ENCOUNTER
Pt was seen at [facility] for UTI. She was instructed by [facility] to see her PCP. Pt. Very upset she is not able to get an appointment for this afternoon as she is in pain and she is taking her  to [facility] for an appt and they are in the area. Pt wants to be fit in today and stated it is urgent. Please let me know if she can be seen by Dr. Johann Granger today. Thanks.

## 2019-08-15 NOTE — PROGRESS NOTES
HISTORY OF PRESENT ILLNESS    Her  sadly just had a stroke and is recovering. She is walking with a rolling walker since her hip fx and is improving     Presents with recent dx of possible UTI. Presented to urgent care with right flank and right lateral abdominal pains for 3 day(s) on 8/12. Results and notes reviewed and UA showed large LE and moderate blood. Was given bactrim and s/s seem to improved. Was called today and asked to stop bactrim since culture was neg. 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 has a past surgical history that includes hx cholecystectomy; hx colonoscopy (2016); hx thyroidectomy (2004); hx knee arthroscopy (Right, 04/11/2017); hx knee replacement; and hx hip fracture tx (Left, 06/16/2019). reports that she has never smoked. She has never used smokeless tobacco. She reports that she drinks about 2.0 standard drinks of alcohol per week. She reports that she does not use drugs. family history includes Diabetes in her father; Heart Disease in her father; Stroke in her mother; Thyroid Cancer in her brother. Physical Exam   Nursing note and vitals reviewed. Blood pressure (!) 150/92, pulse 73, temperature 98.3 °F (36.8 °C), temperature source Oral, resp. rate 18, height 5' 5\" (1.651 m), weight 186 lb (84.4 kg), SpO2 96 %. Constitutional: In no distress.     Eyes: Conjunctivae are normal.  HEENT:  No LAD or thyromegaly   Cardiovascular: Normal rate. regular rhythm. No murmurs  No edema  Pulmonary/Chest: Effort normal. clear to ausculation blaterally  Musculoskeletal:  no edema. Abd:  Neurological: Alert and oriented. Grossly intact cranial nerves and motor function. Skin: No rash noted. Psychiatric: Normal mood and affect. Behavior is normal.     ASSESSMENT and PLAN  Diagnoses and all orders for this visit:    1. Right flank pain  Improved. Stone? Less likely UTI. Constipation? Consider muscle strain  -     AMB POC URINALYSIS DIP STICK AUTO W/O MICRO    2. Microscopic hematuria  3. History of nephrolithiasis  Consider CT abd to evaluate kidney stones    4. Constipation, unspecified constipation type    5. Closed fracture of left hip with routine healing, subsequent encounter  Cont PT/OT. progressing    6. Situational anxiety  stable      lab results and schedule of future lab studies reviewed with patient  reviewed medications and side effects in detail    Return to clinic for further evaluation if new symptoms develop or if current symptoms worsen or fail to resolve. There are no Patient Instructions on file for this visit.

## 2019-08-23 ENCOUNTER — OFFICE VISIT (OUTPATIENT)
Dept: INTERNAL MEDICINE CLINIC | Age: 79
End: 2019-08-23

## 2019-08-23 ENCOUNTER — HOSPITAL ENCOUNTER (OUTPATIENT)
Dept: LAB | Age: 79
Discharge: HOME | End: 2019-08-23
Payer: MEDICARE

## 2019-08-23 ENCOUNTER — PATIENT OUTREACH (OUTPATIENT)
Dept: INTERNAL MEDICINE CLINIC | Age: 79
End: 2019-08-23

## 2019-08-23 ENCOUNTER — HOSPITAL ENCOUNTER (OUTPATIENT)
Dept: GENERAL RADIOLOGY | Age: 79
Discharge: HOME | End: 2019-08-23
Attending: INTERNAL MEDICINE
Payer: MEDICARE

## 2019-08-23 VITALS
WEIGHT: 186 LBS | BODY MASS INDEX: 30.99 KG/M2 | TEMPERATURE: 98.3 F | RESPIRATION RATE: 16 BRPM | HEART RATE: 85 BPM | OXYGEN SATURATION: 98 % | DIASTOLIC BLOOD PRESSURE: 84 MMHG | SYSTOLIC BLOOD PRESSURE: 136 MMHG | HEIGHT: 65 IN

## 2019-08-23 DIAGNOSIS — R10.11 RUQ PAIN: Primary | ICD-10-CM

## 2019-08-23 DIAGNOSIS — R10.11 RUQ PAIN: ICD-10-CM

## 2019-08-23 DIAGNOSIS — K59.00 CONSTIPATION, UNSPECIFIED CONSTIPATION TYPE: ICD-10-CM

## 2019-08-23 PROBLEM — E78.00 HYPERCHOLESTEROLEMIA: Status: ACTIVE | Noted: 2019-08-23

## 2019-08-23 PROCEDURE — 74019 RADEX ABDOMEN 2 VIEWS: CPT

## 2019-08-23 PROCEDURE — 85025 COMPLETE CBC W/AUTO DIFF WBC: CPT

## 2019-08-23 PROCEDURE — 80053 COMPREHEN METABOLIC PANEL: CPT

## 2019-08-23 PROCEDURE — 36415 COLL VENOUS BLD VENIPUNCTURE: CPT

## 2019-08-23 RX ORDER — DOCUSATE SODIUM 100 MG/1
100 CAPSULE, LIQUID FILLED ORAL 2 TIMES DAILY
COMMUNITY
End: 2020-10-05 | Stop reason: ALTCHOICE

## 2019-08-23 NOTE — PROGRESS NOTES
Goals        Post Hospitalization     Attends follow-up appointments as directed. 8/23/2019 No hip pain but c/o continued right upper quadrant pain. Appointment scheduled today with Dr. Anastacio Mishra to evaluate pain. [facility]    7/26/2019 Patient in for [address] She is doing well, still using walker. She feels she is not quite 100% but much improved. [facility]    7/18/2019 Patient reports she is doing ok, but tired today from doing laundry. Hospital/SNF follow up appointment scheduled  with PCP on 7/26/2019. She will attend. [facility]    7/9/2019  Patient home from [facility]. She was discharged on 7/3/2019. She reports she is doing will with no problems. She is being followed by [facility] [address] Amrita Hebert is her nurse. 21 [phone], she has SN, PT, and OT. NN spoke to [facility] and reported patient is well and has no complaints. She is not taking any pain medication. She is afebrile. Sutures are out and she will follow up with surgeon this week. Patient declined to schedule a visit at this time but will call back to schedule at a later time.  [facility]     6/26/19 call placed to [address] & confirmed pt is still admitted to rehab - TSB

## 2019-08-23 NOTE — PROGRESS NOTES
Scott Engel is a 67 y.o. female who presents today for Abdominal Pain  . 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 Today patient is here for an acute visit. .     Abdominal pain   Patient reports abdominal pain. Pain is located in the RUQ without radiation. The pain is described as aching and cramping, and is 2/10 in intensity. Onset was 2 weeks ago. Symptoms have been unchanged since. Aggravating factors: None besides certain movements. Alleviating factors: bowel movements. Associated symptoms: constipation recently. The patient denies anorexia, belching, chills, diarrhea, dysuria, fever, flatus, hematuria, nausea, sweats, urinary frequency, urinary urgency and vomiting. Does not have a Gallbladder. Pt with a history of complicated cholecystectomy in the 70's. Patient was seen by PCP Dr. MCKAYLA LEOS on 14 August after she had been seen in urgent care for right lateral abdominal pain and placed on Bactrim. They had stopped her Bactrim by the time he saw her due to a negative culture. At that time he repeated her urinalysis which was unremarkable with exception of 1+ blood. ROS  Review of Systems   Constitutional: Negative for chills, fever and weight loss. Eyes: Negative for blurred vision, double vision and photophobia. Respiratory: Negative for cough and shortness of breath. Cardiovascular: Negative for chest pain, palpitations and leg swelling. Gastrointestinal: Positive for abdominal pain and constipation. Negative for diarrhea, heartburn, nausea and vomiting. Genitourinary: Negative for dysuria, hematuria and urgency. Musculoskeletal: Negative for myalgias. Neurological: Negative. Endo/Heme/Allergies: Does not bruise/bleed easily. Psychiatric/Behavioral: Negative for depression. The patient is not nervous/anxious. Visit Vitals  /84 (BP 1 Location: Left arm, BP Patient Position: Sitting)   Pulse 85   Temp 98.3 °F (36.8 °C) (Oral)   Resp 16   Ht 5' 5\" (1.651 m)   Wt 186 lb (84.4 kg)   SpO2 98%   BMI 30.95 kg/m²       Physical Exam   Constitutional: She is oriented to person, place, and time. She appears well-developed and well-nourished. Nontoxic-appearing. HENT:   Head: Normocephalic and atraumatic. Cardiovascular: Normal rate and regular rhythm. No murmur heard. Pulmonary/Chest: Effort normal. No respiratory distress. Abdominal:       Pain in general area were noted. Patient with no guarding but tender to palpation. No peritoneal signs. Bowel sounds are normal.  No skin changes in the area   Neurological: She is alert and oriented to person, place, and time. Skin: Skin is warm and dry. Psychiatric: She has a normal mood and affect. Her behavior is normal.         Current Outpatient Medications   Medication Sig    docusate sodium (COLACE) 100 mg capsule Take 100 mg by mouth two (2) times a day.  alendronate (FOSAMAX) 70 mg tablet Take 1 Tab by mouth every seven (7) days.  calcium-vitamin D (OYSTER SHELL) 500 mg(1,250mg) -200 unit per tablet Take 1 Tab by mouth three (3) times daily (with meals). No current facility-administered medications for this visit. Past Medical History:   Diagnosis Date    Benign essential HTN 10/05/2017    declines tx    History of breast cancer 11/2004    right  - s/p lumpectomy w sentinal node 11/2004.   s/p XRT 1 mo.  could not tolerate tamoxifen    History of nephrolithiasis     IBS (irritable bowel syndrome)     Left femoral shaft fracture ([address] 06/16/2019    Pinning Dr. Khalida Chao    Osteoporosis 01/2019    T- 3.3 lumbar spine, T- 2.4 left radius, T- 2.8 of left femoral neck and T- 2.3 right femoral neck. 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 last 9/2017      Past Surgical History:   Procedure Laterality Date    HX CHOLECYSTECTOMY      HX COLONOSCOPY  2016    states benign polyp - repeat 2019    HX HIP FRACTURE TX Left 06/16/2019    HX KNEE ARTHROSCOPY Right 04/11/2017    partial knee replacement in [place]    HX KNEE REPLACEMENT      bilat knee replacement    HX THYROIDECTOMY  2004    nodules, goiter      Social History     Tobacco Use    Smoking status: Never Smoker    Smokeless tobacco: Never Used   Substance Use Topics    Alcohol use: Yes     Alcohol/week: 2.0 standard drinks     Types: 2 Glasses of wine per week      Family History   Problem Relation Age of Onset    Stroke Mother     Heart Disease Father     Diabetes Father     Thyroid Cancer Brother         Allergies   Allergen Reactions    Lidocaine Unknown (comments)     \"throat swells up\"  However patient says this is a distant and unclear memory. She has had many dental procedures under local anesthetetic without problem. Assessment/Plan  Diagnoses and all orders for this visit:    1. RUQ pain -this may possibly musculoskeletal she is been more physically active due to her 's stability. Patient does not have a gallbladder nor does she exhibit classic biliary colic type symptoms. We will check an x-ray to rule out fecal stasis in the right side of the colon as she has experienced recent constipation. Patient to add stool softener and continue PRN MiraLAX. We will get blood work today.   If she continues to have discomfort in a week to 10 days consider ultrasounding the area. -     XR ABD FLAT/ ERECT; Future  -     METABOLIC PANEL, COMPREHENSIVE  -     CBC WITH AUTOMATED DIFF    2. Constipation, unspecified constipation type        Follow-up and Dispositions    · Return if symptoms worsen or fail to improve. Kendell Silva MD  8/23/2019    This note was created with the help of speech recognition software Charlyne Kayser) and may contain some 'sound alike' errors.

## 2019-08-24 LAB
ALBUMIN SERPL-MCNC: 4.6 G/DL (ref 3.5–4.8)
ALBUMIN/GLOB SERPL: 2.1 {RATIO} (ref 1.2–2.2)
ALP SERPL-CCNC: 116 IU/L (ref 39–117)
ALT SERPL-CCNC: 16 IU/L (ref 0–32)
AST SERPL-CCNC: 16 IU/L (ref 0–40)
BASOPHILS # BLD AUTO: 0 X10E3/UL (ref 0–0.2)
BASOPHILS NFR BLD AUTO: 0 %
BILIRUB SERPL-MCNC: <0.2 MG/DL (ref 0–1.2)
BUN SERPL-MCNC: 15 MG/DL (ref 8–27)
BUN/CREAT SERPL: 19 (ref 12–28)
CALCIUM SERPL-MCNC: 10.5 MG/DL (ref 8.7–10.3)
CHLORIDE SERPL-SCNC: 101 MMOL/L (ref 96–106)
CO2 SERPL-SCNC: 24 MMOL/L (ref 20–29)
CREAT SERPL-MCNC: 0.77 MG/DL (ref 0.57–1)
EOSINOPHIL # BLD AUTO: 0.3 X10E3/UL (ref 0–0.4)
EOSINOPHIL NFR BLD AUTO: 3 %
ERYTHROCYTE [DISTWIDTH] IN BLOOD BY AUTOMATED COUNT: 14.7 % (ref 12.3–15.4)
GLOBULIN SER CALC-MCNC: 2.2 G/DL (ref 1.5–4.5)
GLUCOSE SERPL-MCNC: 85 MG/DL (ref 65–99)
HCT VFR BLD AUTO: 40.5 % (ref 34–46.6)
HGB BLD-MCNC: 13.2 G/DL (ref 11.1–15.9)
IMM GRANULOCYTES # BLD AUTO: 0 X10E3/UL (ref 0–0.1)
IMM GRANULOCYTES NFR BLD AUTO: 0 %
LYMPHOCYTES # BLD AUTO: 2.6 X10E3/UL (ref 0.7–3.1)
LYMPHOCYTES NFR BLD AUTO: 35 %
MCH RBC QN AUTO: 28.5 PG (ref 26.6–33)
MCHC RBC AUTO-ENTMCNC: 32.6 G/DL (ref 31.5–35.7)
MCV RBC AUTO: 88 FL (ref 79–97)
MONOCYTES # BLD AUTO: 0.5 X10E3/UL (ref 0.1–0.9)
MONOCYTES NFR BLD AUTO: 6 %
NEUTROPHILS # BLD AUTO: 4.2 X10E3/UL (ref 1.4–7)
NEUTROPHILS NFR BLD AUTO: 56 %
PLATELET # BLD AUTO: 332 X10E3/UL (ref 150–450)
POTASSIUM SERPL-SCNC: 4.7 MMOL/L (ref 3.5–5.2)
PROT SERPL-MCNC: 6.8 G/DL (ref 6–8.5)
RBC # BLD AUTO: 4.63 X10E6/UL (ref 3.77–5.28)
SODIUM SERPL-SCNC: 141 MMOL/L (ref 134–144)
WBC # BLD AUTO: 7.6 X10E3/UL (ref 3.4–10.8)

## 2020-10-05 ENCOUNTER — OFFICE VISIT (OUTPATIENT)
Dept: INTERNAL MEDICINE CLINIC | Age: 80
End: 2020-10-05
Payer: MEDICARE

## 2020-10-05 VITALS
BODY MASS INDEX: 29.99 KG/M2 | WEIGHT: 180 LBS | DIASTOLIC BLOOD PRESSURE: 85 MMHG | TEMPERATURE: 97.9 F | RESPIRATION RATE: 18 BRPM | HEART RATE: 74 BPM | HEIGHT: 65 IN | SYSTOLIC BLOOD PRESSURE: 160 MMHG | OXYGEN SATURATION: 97 %

## 2020-10-05 DIAGNOSIS — I10 BENIGN ESSENTIAL HTN: Primary | ICD-10-CM

## 2020-10-05 DIAGNOSIS — R07.89 CHEST PRESSURE: ICD-10-CM

## 2020-10-05 DIAGNOSIS — N39.0 URINARY TRACT INFECTION WITHOUT HEMATURIA, SITE UNSPECIFIED: ICD-10-CM

## 2020-10-05 PROBLEM — G89.29 CHRONIC PAIN: Status: ACTIVE | Noted: 2020-04-23

## 2020-10-05 PROBLEM — G43.909 MIGRAINE: Status: ACTIVE | Noted: 2020-04-23

## 2020-10-05 PROBLEM — M85.80 OSTEOPENIA: Status: ACTIVE | Noted: 2020-04-23

## 2020-10-05 PROBLEM — J30.9 ALLERGIC RHINITIS: Status: ACTIVE | Noted: 2020-04-23

## 2020-10-05 LAB
BILIRUB UR QL STRIP: NEGATIVE
GLUCOSE UR-MCNC: NEGATIVE MG/DL
KETONES P FAST UR STRIP-MCNC: NEGATIVE MG/DL
PH UR STRIP: 7 [PH] (ref 4.6–8)
PROT UR QL STRIP: NEGATIVE
SP GR UR STRIP: 1.01 (ref 1–1.03)
UA UROBILINOGEN AMB POC: NORMAL (ref 0.2–1)
URINALYSIS CLARITY POC: CLEAR
URINALYSIS COLOR POC: YELLOW
URINE BLOOD POC: NORMAL
URINE LEUKOCYTES POC: NORMAL
URINE NITRITES POC: NEGATIVE

## 2020-10-05 PROCEDURE — 1090F PRES/ABSN URINE INCON ASSESS: CPT | Performed by: INTERNAL MEDICINE

## 2020-10-05 PROCEDURE — G8432 DEP SCR NOT DOC, RNG: HCPCS | Performed by: INTERNAL MEDICINE

## 2020-10-05 PROCEDURE — 81003 URINALYSIS AUTO W/O SCOPE: CPT | Performed by: INTERNAL MEDICINE

## 2020-10-05 PROCEDURE — 99213 OFFICE O/P EST LOW 20 MIN: CPT | Performed by: INTERNAL MEDICINE

## 2020-10-05 PROCEDURE — G0463 HOSPITAL OUTPT CLINIC VISIT: HCPCS | Performed by: INTERNAL MEDICINE

## 2020-10-05 PROCEDURE — G8536 NO DOC ELDER MAL SCRN: HCPCS | Performed by: INTERNAL MEDICINE

## 2020-10-05 PROCEDURE — G8419 CALC BMI OUT NRM PARAM NOF/U: HCPCS | Performed by: INTERNAL MEDICINE

## 2020-10-05 PROCEDURE — G8753 SYS BP > OR = 140: HCPCS | Performed by: INTERNAL MEDICINE

## 2020-10-05 PROCEDURE — G8427 DOCREV CUR MEDS BY ELIG CLIN: HCPCS | Performed by: INTERNAL MEDICINE

## 2020-10-05 PROCEDURE — G8754 DIAS BP LESS 90: HCPCS | Performed by: INTERNAL MEDICINE

## 2020-10-05 PROCEDURE — 1101F PT FALLS ASSESS-DOCD LE1/YR: CPT | Performed by: INTERNAL MEDICINE

## 2020-10-05 RX ORDER — LOSARTAN POTASSIUM 50 MG/1
50 TABLET ORAL DAILY
Qty: 30 TAB | Refills: 1 | Status: SHIPPED | OUTPATIENT
Start: 2020-10-05 | End: 2020-12-17

## 2020-10-06 NOTE — PROGRESS NOTES
HISTORY OF PRESENT ILLNESS    Chief Complaint   Patient presents with    Bladder Infection       Presents for follow-up    She is somewhat scattered in thinking today. Reports ongoing stress regarding her  who has some degree of Alzheimer's dementia but is in denial and is quite angry at his neurologist clearly is demented. Hypertension - taking no meds for HTN  She reports some episodes where her from heart \"feels heavy\". Appears to be at random. Not clearly exertional.  No radiation. Hypertension ROS: taking medications as instructed, no medication side effects noted, no TIA's,, no dyspnea on exertion, no swelling of ankles     reports that she has never smoked. She has never used smokeless tobacco.    reports current alcohol use of about 2.0 standard drinks of alcohol per week. BP Readings from Last 2 Encounters:   10/05/20 (!) 160/85   08/23/19 136/84     Was dx with UTI 8/15 by [facility], but no culture done. Patient was upset by this. Was given bactrim. Today, reports no significant dysuria. Perhaps has some mild frequency. She is still just skeptical about whether or not she had her UTI cured. 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 has a past surgical history that includes hx cholecystectomy; hx colonoscopy (2016); hx thyroidectomy (2004); hx knee arthroscopy (Right, 04/11/2017); hx knee replacement; and hx hip fracture tx (Left, 06/16/2019). reports that she has never smoked.  She has never used smokeless tobacco. She reports current alcohol use of about 2.0 standard drinks of alcohol per week. She reports that she does not use drugs. family history includes Diabetes in her father; Heart Disease in her father; Stroke in her mother; Thyroid Cancer in her brother. Physical Exam   Nursing note and vitals reviewed. Blood pressure (!) 160/85, pulse 74, temperature 97.9 °F (36.6 °C), temperature source Oral, resp. rate 18, height 5' 5\" (1.651 m), weight 180 lb (81.6 kg), SpO2 97 %. Constitutional:  No distress. Eyes: Conjunctivae are normal.   Ears:  Hearing grossly intact  Cardiovascular: Normal rate. regular rhythm, no murmurs or gallops  No edema  Pulmonary/Chest: Effort normal.   CTAB  Musculoskeletal: moves all 4 extremities   Neurological: Alert and oriented to person, place, and time. Skin: No rash noted. Psychiatric: Normal mood and affect. Behavior is normal.     ASSESSMENT and PLAN  Diagnoses and all orders for this visit:    1. Benign essential HTN  Uncontrolled new diagnosis of HTN. Strongly recommend treatment. Losartan started. I think that her chest symptoms may be related to hypertension, but need to consider coronary disease.  -     losartan (COZAAR) 50 mg tablet; Take 1 Tab by mouth daily.  -     REFERRAL TO CARDIOLOGY    2. Chest pressure  She is vague about this, but does have some sensations of chest pressure. Hypertension is certainly contributing and anxiety may also contribute. I do recommend cardiology consultation and consideration of stress test.  -     REFERRAL TO CARDIOLOGY    3. Urinary tract infection without hematuria, site unspecified  Urinalysis today shows trace leukocytes and trace blood, both unlikely to be clinically significant. She remains asymptomatic. Return to clinic if symptoms worsen.   -     AMB POC URINALYSIS DIP STICK AUTO W/O MICRO    lab results and schedule of future lab studies reviewed with patient  reviewed medications and side effects in detail    Return to clinic for further evaluation if new symptoms develop    Return to clinic in November as scheduled for Medicare wellness exam recommend Pneumovax    Current Outpatient Medications   Medication Sig    losartan (COZAAR) 50 mg tablet Take 1 Tab by mouth daily. No current facility-administered medications for this visit.

## 2020-10-22 ENCOUNTER — CLINICAL SUPPORT (OUTPATIENT)
Dept: CARDIOLOGY CLINIC | Age: 80
End: 2020-10-22
Payer: MEDICARE

## 2020-10-22 ENCOUNTER — OFFICE VISIT (OUTPATIENT)
Dept: CARDIOLOGY CLINIC | Age: 80
End: 2020-10-22
Payer: MEDICARE

## 2020-10-22 VITALS
WEIGHT: 183 LBS | SYSTOLIC BLOOD PRESSURE: 140 MMHG | RESPIRATION RATE: 18 BRPM | DIASTOLIC BLOOD PRESSURE: 82 MMHG | HEART RATE: 84 BPM | BODY MASS INDEX: 30.49 KG/M2 | OXYGEN SATURATION: 97 % | HEIGHT: 65 IN

## 2020-10-22 DIAGNOSIS — R00.2 PALPITATIONS: ICD-10-CM

## 2020-10-22 DIAGNOSIS — R00.2 PALPITATION: Primary | ICD-10-CM

## 2020-10-22 DIAGNOSIS — I10 BENIGN ESSENTIAL HTN: ICD-10-CM

## 2020-10-22 DIAGNOSIS — R07.9 CHEST PAIN, UNSPECIFIED TYPE: Primary | ICD-10-CM

## 2020-10-22 PROCEDURE — 93005 ELECTROCARDIOGRAM TRACING: CPT | Performed by: INTERNAL MEDICINE

## 2020-10-22 PROCEDURE — 1090F PRES/ABSN URINE INCON ASSESS: CPT | Performed by: INTERNAL MEDICINE

## 2020-10-22 PROCEDURE — 99214 OFFICE O/P EST MOD 30 MIN: CPT | Performed by: INTERNAL MEDICINE

## 2020-10-22 PROCEDURE — G8417 CALC BMI ABV UP PARAM F/U: HCPCS | Performed by: INTERNAL MEDICINE

## 2020-10-22 PROCEDURE — G8753 SYS BP > OR = 140: HCPCS | Performed by: INTERNAL MEDICINE

## 2020-10-22 PROCEDURE — G8432 DEP SCR NOT DOC, RNG: HCPCS | Performed by: INTERNAL MEDICINE

## 2020-10-22 PROCEDURE — G8536 NO DOC ELDER MAL SCRN: HCPCS | Performed by: INTERNAL MEDICINE

## 2020-10-22 PROCEDURE — G8427 DOCREV CUR MEDS BY ELIG CLIN: HCPCS | Performed by: INTERNAL MEDICINE

## 2020-10-22 PROCEDURE — 93227 XTRNL ECG REC<48 HR R&I: CPT | Performed by: INTERNAL MEDICINE

## 2020-10-22 PROCEDURE — G8754 DIAS BP LESS 90: HCPCS | Performed by: INTERNAL MEDICINE

## 2020-10-22 PROCEDURE — G0463 HOSPITAL OUTPT CLINIC VISIT: HCPCS | Performed by: INTERNAL MEDICINE

## 2020-10-22 PROCEDURE — 1101F PT FALLS ASSESS-DOCD LE1/YR: CPT | Performed by: INTERNAL MEDICINE

## 2020-10-22 PROCEDURE — 93225 XTRNL ECG REC<48 HRS REC: CPT

## 2020-10-22 RX ORDER — CALCIUM CARBONATE/VITAMIN D3 250-3.125
1 TABLET ORAL DAILY
COMMUNITY
End: 2022-03-28 | Stop reason: ALTCHOICE

## 2020-10-22 NOTE — PROGRESS NOTES
Miguel Vega MD    [address] [facility] [place], [address] [facility]    Office [phone],RZT [phone]      Cristian Johnson is a 79 y.o. female referred for evaluation of palpitations/chest pressure. Consult requested by Rema Ramon MD      Primary care physician:  Rema Ramon MD      Chief complaint:  Cristian Johnson is a 79 y.o. female who complains of   Chief Complaint   Patient presents with    New Patient    Palpitations       Dear Светлана Paul,    I had the pleasure of seeing Ms Cristian Johnson in the office today. Assessment:    Palpitations  Hypertension  Chest pressure  History of IBS/? History of anxiety      Plan:   Holter monitor  Echocardiogram  Minnie nuc  Had a detailed discussion about risk of having uncontrolled blood pressure. Patient willing to try and take half a tablet of the losartan 50 mg daily. Aggressive cardiovascular risk factor modifications  Follow-up in 6 weeks/earlier as needed      Patient understands the plan. All questions were answered to the patient's satisfaction. Medication Side Effects and Warnings were discussed with patient: yes  Patient Labs were reviewed and or requested:  yes  Patient Past Records were reviewed and or requested: yes    I appreciate the opportunity to be involved in Ms. Chavez. Please see note below for details. Please do not hesitate to contact us with questions or concerns. Miguel Vega MD    Cardiac Testing/ Procedures: A. Cardiac Cath/PCI:    B.ECHO/FREEMAN:    C.StressNuclear/Stress ECHO/Stress test:    D.Vascular:    E. EP:    F. Miscellaneous:    History of present illness:    Patient is a 78-year-old CF who is referred for palpitations/hypertension/chest pressure. History of intermittent palpitations, chest pressure for the past few weeks. Has been under stress because her  has Alzheimer's. History of high blood pressure but has not been willing to take medications previously.   She is worried about the side effects of medications. She has been prescribed losartan 50 mg daily and has the medication at home but has not taken it. Chest pressure is retrosternal, mild to moderate intensity, can occur with rest or exertion but especially is brought on by stress. Takes care of her  but is fairly active for her age. Used to swim previously. No swelling lower extremities. No family history of premature CAD      Past Medical History:   Diagnosis Date    Benign essential HTN 10/05/2017    declines tx    History of breast cancer 11/2004    right  - s/p lumpectomy w sentinal node 11/2004. s/p XRT 1 mo.  could not tolerate tamoxifen    History of nephrolithiasis     IBS (irritable bowel syndrome)     Left femoral shaft fracture ([facility] [address] 06/16/2019    Pinning Dr. Dejah Moran    Osteoporosis 01/2019    T- 3.3 lumbar spine, T- 2.4 left radius, T- 2.8 of left femoral neck and T- 2.3 right femoral neck. 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   last 9/2017      Past Surgical History:   Procedure Laterality Date    HX CHOLECYSTECTOMY      HX COLONOSCOPY  2016    states benign polyp - repeat 2019    HX HIP FRACTURE TX Left 06/16/2019    HX KNEE ARTHROSCOPY Right 04/11/2017    partial knee replacement in [place]    HX KNEE REPLACEMENT      bilat knee replacement    HX THYROIDECTOMY  2004    nodules, goiter     Family History   Problem Relation Age of Onset    Stroke Mother     Heart Disease Father     Diabetes Father     Thyroid Cancer Brother       Social History     Tobacco Use    Smoking status: Never Smoker    Smokeless tobacco: Never Used   Substance Use Topics    Alcohol use: Not Currently     Alcohol/week: 2.0 standard drinks     Types: 2 Glasses of wine per week    Drug use: No          Medications before admission:    Current Outpatient Medications   Medication Sig Dispense    multivit,iron,minerals/lutein (CENTRUM SILVER ULTRA WOMEN'S PO) Take  by mouth.  calcium-vitamin D (Oyster Shell Calcium-Vit D3) 250 mg-125 unit per tablet Take 1 Tab by mouth daily.  losartan (COZAAR) 50 mg tablet Take 1 Tab by mouth daily. 30 Tab     No current facility-administered medications for this visit. Review of Systems:  (bold if positive, if negative)    Gen:  Eyes:  ENT:  CVS:  Pulm:  GI:  :  MS:  arthritisSkin:  Psych:  Endo:  Hem:  Renal:  Neuro:      Physical Exam:  Visit Vitals  BP (!) 140/82 (BP 1 Location: Left arm, BP Patient Position: Sitting)   Pulse 84   Resp 18   Ht 5' 5\" (1.651 m)   Wt 183 lb (83 kg)   SpO2 97%   BMI 30.45 kg/m²          Gen: Well-developed, well-nourished, in no acute distress  HEENT:  Pink conjunctivae, hearing intact to voice, moist mucous membranes  Neck: Supple,No JVD, No Carotid Bruit, Thyroid- non tender  Resp: No accessory muscle use, Clear breath sounds, No rales or rhonchi  Card: Regular Rate,Rythm,Normal S1, S2, No murmurs, rubs or gallop. No thrills. Abd:  Soft, non-tender, non-distended, normoactive bowel sounds are present,   MSK: No cyanosis or clubbing  Skin: No rashes or ulcers  Neuro:  moving all four extremities, no focal deficit, follows commands appropriately  Psych:  Good insight, oriented to person, place and time, alert, Nml Affect  LE: No edema  Vascular: Radial Pulses 2+ and symmetric        EKG: Sinus rhythm, normal axis, normal intervals, nonspecific ST-T changes      Cxray:    LABS:    No results for input(s): CPK, TROIQ in the last 72 hours.     No lab exists for component: CKQMB, CPKMB    Lab Results   Component Value Date/Time    WBC 7.6 08/23/2019 03:18 PM    HGB 13.2 08/23/2019 03:18 PM    HCT 40.5 08/23/2019 03:18 PM    PLATELET 296 22/55/6700 03:18 PM    MCV 88 08/23/2019 03:18 PM     Lab Results   Component Value Date/Time    Sodium 141 08/23/2019 03:18 PM    Potassium 4.7 08/23/2019 03:18 PM    Chloride 101 08/23/2019 03:18 PM    CO2 24 08/23/2019 03:18 PM    Anion gap 3 (L) 06/17/2019 05:57 AM    Glucose 85 08/23/2019 03:18 PM    BUN 15 08/23/2019 03:18 PM    Creatinine 0.77 08/23/2019 03:18 PM    BUN/Creatinine ratio 19 08/23/2019 03:18 PM    GFR est AA 86 08/23/2019 03:18 PM    GFR est non-AA 74 08/23/2019 03:18 PM    Calcium 10.5 (H) 08/23/2019 03:18 PM       ATTENTION:   This medical record was transcribed using an electronic medical records/speech recognition system. Although proofread, it may and can contain electronic, spelling and other errors. Corrections may be executed at a later time. Please feel free to contact us for any clarifications as needed.         Mary Jane Avina MD

## 2020-10-22 NOTE — PROGRESS NOTES
.  Visit Vitals  BP (!) 140/82 (BP 1 Location: Left arm, BP Patient Position: Sitting)   Pulse 84   Resp 18   Ht 5' 5\" (1.651 m)   Wt 183 lb (83 kg)   SpO2 97%   BMI 30.45 kg/m²     Patient has not seen Cardiologist in 20 years, felt palpitations since 6 months on/off. Stress due to  illness.    No recent complaints

## 2020-10-22 NOTE — Clinical Note
Jossy Caldwell,    Thanks for the consult. Will proceed with cardiac workup as per my note. As you are already aware, Audubonkristina Austin is moving on from [address] to pursue different career goals. However, I and my other partners are here and available to continue providing your ( and others in your group) patients the best cardiology care. Please let me know if you we can do anything better to help your patients.  Again, thanks for the referral.    AUDI CHOWDHURY

## 2020-10-22 NOTE — PROGRESS NOTES
Applied Holter and gave instructions. Pt verbalized understanding of its use and will return tomorrow 10/23/20.

## 2020-11-02 ENCOUNTER — DOCUMENTATION ONLY (OUTPATIENT)
Dept: CARDIOLOGY CLINIC | Age: 80
End: 2020-11-02

## 2020-11-02 NOTE — PROGRESS NOTES
10/22/20   Holter monitor  HR 52 to 105 bpm; PVC ( 17)/ PAC ( 4813 )   No sig arrhythmia    Next appt 11/23/20    Ric Daneilson.  MD, [facility] [place]

## 2020-11-09 ENCOUNTER — TELEPHONE (OUTPATIENT)
Dept: CARDIOLOGY CLINIC | Age: 80
End: 2020-11-09

## 2020-11-09 NOTE — TELEPHONE ENCOUNTER
Spoke with patient about scheduling conflict. She has a nuc at 12:30; physical at 1330, and a echo at 16:00. I explained to her that the nuclear test is 4 hours long and that would run into her time with   [facility]. She stated that she is still recovering from her extractions and prefer to reschedule her Nuclear test.  I explained to her the importance of having the nuclear test but still insisted on cancelling nuclear test and will call back to reschedule.   She will keep her Echo test.

## 2020-11-11 ENCOUNTER — TELEPHONE (OUTPATIENT)
Dept: CARDIOLOGY CLINIC | Age: 80
End: 2020-11-11

## 2020-11-11 NOTE — TELEPHONE ENCOUNTER
Returned patient's phone call. We confirmed her nuclear stress test is December 1, 0900. I also told her to plan on being here for 4 hours/ tried to go over instructions with her and she stated she already has them on a sheet.

## 2021-05-04 ENCOUNTER — TELEPHONE (OUTPATIENT)
Dept: CARDIOLOGY CLINIC | Age: 81
End: 2021-05-04

## 2021-05-04 DIAGNOSIS — R07.9 CHEST PAIN, UNSPECIFIED TYPE: ICD-10-CM

## 2021-05-04 DIAGNOSIS — R00.2 PALPITATIONS: ICD-10-CM

## 2021-05-04 DIAGNOSIS — I10 BENIGN ESSENTIAL HTN: Primary | ICD-10-CM

## 2021-05-04 NOTE — TELEPHONE ENCOUNTER
Patient called she has been sick as well as her  and was unable to do Echo & [place] that was ordered at [address] in October 2020. Patient would like to get test done in the hospital.  I informed patient I would reorder tests and provided her with the phone number to scheduling [phone]. Patient verbalized understanding.

## 2021-06-02 ENCOUNTER — APPOINTMENT (OUTPATIENT)
Dept: NUCLEAR MEDICINE | Age: 81
End: 2021-06-02
Attending: EMERGENCY MEDICINE
Payer: MEDICARE

## 2021-06-02 ENCOUNTER — APPOINTMENT (OUTPATIENT)
Dept: NON INVASIVE DIAGNOSTICS | Age: 81
End: 2021-06-02
Attending: EMERGENCY MEDICINE
Payer: MEDICARE

## 2021-06-02 ENCOUNTER — HOSPITAL ENCOUNTER (EMERGENCY)
Age: 81
Discharge: HOME | End: 2021-06-02
Attending: EMERGENCY MEDICINE
Payer: MEDICARE

## 2021-06-02 ENCOUNTER — APPOINTMENT (OUTPATIENT)
Dept: GENERAL RADIOLOGY | Age: 81
End: 2021-06-02
Attending: EMERGENCY MEDICINE
Payer: MEDICARE

## 2021-06-02 VITALS
SYSTOLIC BLOOD PRESSURE: 142 MMHG | TEMPERATURE: 98.1 F | OXYGEN SATURATION: 97 % | RESPIRATION RATE: 17 BRPM | HEART RATE: 66 BPM | BODY MASS INDEX: 30.49 KG/M2 | DIASTOLIC BLOOD PRESSURE: 68 MMHG | WEIGHT: 183 LBS | HEIGHT: 65 IN

## 2021-06-02 DIAGNOSIS — I10 ESSENTIAL HYPERTENSION: ICD-10-CM

## 2021-06-02 DIAGNOSIS — R07.9 CHEST PAIN, UNSPECIFIED TYPE: Primary | ICD-10-CM

## 2021-06-02 LAB
ALBUMIN SERPL-MCNC: 3.6 G/DL (ref 3.5–5)
ALBUMIN/GLOB SERPL: 1.2 {RATIO} (ref 1.1–2.2)
ALP SERPL-CCNC: 90 U/L (ref 45–117)
ALT SERPL-CCNC: 22 U/L (ref 12–78)
ANION GAP SERPL CALC-SCNC: 3 MMOL/L (ref 5–15)
AST SERPL-CCNC: 12 U/L (ref 15–37)
ATRIAL RATE: 66 BPM
BASOPHILS # BLD: 0 K/UL (ref 0–0.1)
BASOPHILS NFR BLD: 1 % (ref 0–1)
BILIRUB SERPL-MCNC: 0.3 MG/DL (ref 0.2–1)
BUN SERPL-MCNC: 19 MG/DL (ref 6–20)
BUN/CREAT SERPL: 29 (ref 12–20)
CALCIUM SERPL-MCNC: 9.3 MG/DL (ref 8.5–10.1)
CALCULATED P AXIS, ECG09: 51 DEGREES
CALCULATED R AXIS, ECG10: 11 DEGREES
CALCULATED T AXIS, ECG11: 49 DEGREES
CHLORIDE SERPL-SCNC: 109 MMOL/L (ref 97–108)
CO2 SERPL-SCNC: 28 MMOL/L (ref 21–32)
COMMENT, HOLDF: NORMAL
CREAT SERPL-MCNC: 0.66 MG/DL (ref 0.55–1.02)
DIAGNOSIS, 93000: NORMAL
DIFFERENTIAL METHOD BLD: NORMAL
EOSINOPHIL # BLD: 0.4 K/UL (ref 0–0.4)
EOSINOPHIL NFR BLD: 6 % (ref 0–7)
ERYTHROCYTE [DISTWIDTH] IN BLOOD BY AUTOMATED COUNT: 13.3 % (ref 11.5–14.5)
GLOBULIN SER CALC-MCNC: 2.9 G/DL (ref 2–4)
GLUCOSE SERPL-MCNC: 93 MG/DL (ref 65–100)
HCT VFR BLD AUTO: 38 % (ref 35–47)
HGB BLD-MCNC: 12.1 G/DL (ref 11.5–16)
IMM GRANULOCYTES # BLD AUTO: 0 K/UL (ref 0–0.04)
IMM GRANULOCYTES NFR BLD AUTO: 0 % (ref 0–0.5)
LYMPHOCYTES # BLD: 2.3 K/UL (ref 0.8–3.5)
LYMPHOCYTES NFR BLD: 37 % (ref 12–49)
MCH RBC QN AUTO: 29.3 PG (ref 26–34)
MCHC RBC AUTO-ENTMCNC: 31.8 G/DL (ref 30–36.5)
MCV RBC AUTO: 92 FL (ref 80–99)
MONOCYTES # BLD: 0.6 K/UL (ref 0–1)
MONOCYTES NFR BLD: 9 % (ref 5–13)
NEUTS SEG # BLD: 3 K/UL (ref 1.8–8)
NEUTS SEG NFR BLD: 47 % (ref 32–75)
NRBC # BLD: 0 K/UL (ref 0–0.01)
NRBC BLD-RTO: 0 PER 100 WBC
P-R INTERVAL, ECG05: 150 MS
PLATELET # BLD AUTO: 247 K/UL (ref 150–400)
PMV BLD AUTO: 9.2 FL (ref 8.9–12.9)
POTASSIUM SERPL-SCNC: 4.3 MMOL/L (ref 3.5–5.1)
PROT SERPL-MCNC: 6.5 G/DL (ref 6.4–8.2)
Q-T INTERVAL, ECG07: 404 MS
QRS DURATION, ECG06: 84 MS
QTC CALCULATION (BEZET), ECG08: 423 MS
RBC # BLD AUTO: 4.13 M/UL (ref 3.8–5.2)
SAMPLES BEING HELD,HOLD: NORMAL
SODIUM SERPL-SCNC: 140 MMOL/L (ref 136–145)
STRESS BASELINE DIAS BP: 69 MMHG
STRESS BASELINE HR: 64 BPM
STRESS BASELINE SYS BP: 170 MMHG
STRESS ESTIMATED WORKLOAD: 1 METS
STRESS EXERCISE DUR MIN: NORMAL
STRESS PEAK DIAS BP: 67 MMHG
STRESS PEAK SYS BP: 178 MMHG
STRESS PERCENT HR ACHIEVED: 64 %
STRESS POST PEAK HR: 90 BPM
STRESS RATE PRESSURE PRODUCT: NORMAL BPM*MMHG
STRESS ST DEPRESSION: 0 MM
STRESS ST ELEVATION: 0 MM
STRESS TARGET HR: 141 BPM
TROPONIN I SERPL-MCNC: <0.05 NG/ML
TROPONIN I SERPL-MCNC: <0.05 NG/ML
VENTRICULAR RATE, ECG03: 66 BPM
WBC # BLD AUTO: 6.3 K/UL (ref 3.6–11)

## 2021-06-02 PROCEDURE — 36415 COLL VENOUS BLD VENIPUNCTURE: CPT

## 2021-06-02 PROCEDURE — 84484 ASSAY OF TROPONIN QUANT: CPT

## 2021-06-02 PROCEDURE — 78452 HT MUSCLE IMAGE SPECT MULT: CPT | Performed by: INTERNAL MEDICINE

## 2021-06-02 PROCEDURE — 93016 CV STRESS TEST SUPVJ ONLY: CPT | Performed by: INTERNAL MEDICINE

## 2021-06-02 PROCEDURE — 93018 CV STRESS TEST I&R ONLY: CPT | Performed by: INTERNAL MEDICINE

## 2021-06-02 PROCEDURE — 96374 THER/PROPH/DIAG INJ IV PUSH: CPT

## 2021-06-02 PROCEDURE — 78452 HT MUSCLE IMAGE SPECT MULT: CPT

## 2021-06-02 PROCEDURE — 99285 EMERGENCY DEPT VISIT HI MDM: CPT

## 2021-06-02 PROCEDURE — 85025 COMPLETE CBC W/AUTO DIFF WBC: CPT

## 2021-06-02 PROCEDURE — 71046 X-RAY EXAM CHEST 2 VIEWS: CPT

## 2021-06-02 PROCEDURE — 96376 TX/PRO/DX INJ SAME DRUG ADON: CPT

## 2021-06-02 PROCEDURE — 74011250636 HC RX REV CODE- 250/636: Performed by: EMERGENCY MEDICINE

## 2021-06-02 PROCEDURE — 80053 COMPREHEN METABOLIC PANEL: CPT

## 2021-06-02 PROCEDURE — 93005 ELECTROCARDIOGRAM TRACING: CPT

## 2021-06-02 PROCEDURE — 96375 TX/PRO/DX INJ NEW DRUG ADDON: CPT

## 2021-06-02 RX ORDER — NITROGLYCERIN 0.4 MG/1
0.4 TABLET SUBLINGUAL
Status: DISCONTINUED | OUTPATIENT
Start: 2021-06-02 | End: 2021-06-02 | Stop reason: HOSPADM

## 2021-06-02 RX ADMIN — REGADENOSON 0.4 MG: 0.08 INJECTION, SOLUTION INTRAVENOUS at 09:15

## 2021-06-02 NOTE — ED TRIAGE NOTES
Triage: Pt advises that she has had left sided chest pain since this morning. Pt also advises of leg cramps since this am as well.

## 2021-06-02 NOTE — ED PROVIDER NOTES
44-year-old female presenting ER with complaint of chest pain. Patient reports that she was sleeping and around 3 PM started having some cramping in her left leg and then cramping in the left arm pain that radiates in the left arm to the chest.  Patient reports having continued chest pain. Took 2 baby aspirin's without improvement. Tried drinking half a glass of wine which did not help her symptoms and then took 2 more baby aspirins. Patient reports still having the chest pain described as pressure sensation. Patient is followed by cardiology Dr. Leandra Castellon and has been scheduled for stress test and echo however due to Covid has not gotten this done. No previous history of ACS. No history of abnormal heart rhythms. Patient has history of hypertension but declines treatment. Patient denies any burping or belching. Patient reports having some nausea and sweating with this and some mild dizziness. Patient does endorse having history of anxiety. Past Medical History:   Diagnosis Date    Benign essential HTN 10/05/2017    declines tx    History of breast cancer 11/2004    right  - s/p lumpectomy w sentinal node 11/2004. s/p XRT 1 mo.  could not tolerate tamoxifen    History of nephrolithiasis     IBS (irritable bowel syndrome)     Left femoral shaft fracture ([facility] [address] 06/16/2019    Pinning Dr. Waters Gromartha    Osteoporosis 01/2019    T- 3.3 lumbar spine, T- 2.4 left radius, T- 2.8 of left femoral neck and T- 2.3 right femoral neck. 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   last 9/2017       Past Surgical History:   Procedure Laterality Date    HX CHOLECYSTECTOMY      HX COLONOSCOPY  2016    states benign polyp - repeat 2019    HX HIP FRACTURE TX Left 06/16/2019    HX KNEE ARTHROSCOPY Right 04/11/2017    partial knee replacement in [address] [place] [address] HX KNEE REPLACEMENT      bilat knee replacement    HX THYROIDECTOMY  2004    nodules, goiter         Family History:   Problem Relation Age of Onset    Stroke Mother     Heart Disease Father     Diabetes Father     Thyroid Cancer Brother        Social History     Socioeconomic History    Marital status:      Spouse name: Not on file    Number of children: Not on file    Years of education: Not on file    Highest education level: Not on file   Occupational History    Not on file   Tobacco Use    Smoking status: Never Smoker    Smokeless tobacco: Never Used   Substance and Sexual Activity    Alcohol use: Not Currently     Alcohol/week: 2.0 standard drinks     Types: 2 Glasses of wine per week    Drug use: No    Sexual activity: Never   Other Topics Concern    Not on file   Social History Narrative    Not on file     Social Determinants of Health     Financial Resource Strain:     Difficulty of Paying Living Expenses:    Food Insecurity:     Worried About Running Out of Food in the Last Year:     Ran Out of Food in the Last Year:    Transportation Needs:     Lack of Transportation (Medical):  Lack of Transportation (Non-Medical):    Physical Activity:     Days of Exercise per Week:     Minutes of Exercise per Session:    Stress:     Feeling of Stress :    Social Connections:     Frequency of Communication with Friends and Family:     Frequency of Social Gatherings with Friends and Family:     Attends Temple Services:     Active Member of Clubs or Organizations:     Attends Club or Organization Meetings:     Marital Status:    Intimate Partner Violence:     Fear of Current or Ex-Partner:     Emotionally Abused:     Physically Abused:     Sexually Abused: ALLERGIES: Hydrocortisone, Lidocaine, and Metronidazole    Review of Systems   Constitutional: Positive for diaphoresis. Negative for chills and fever.    HENT: Negative for congestion and sore throat. Eyes: Negative for pain. Respiratory: Negative for shortness of breath. Cardiovascular: Positive for chest pain. Negative for palpitations and leg swelling. Gastrointestinal: Positive for nausea. Negative for abdominal pain, diarrhea and vomiting. Genitourinary: Negative for dysuria and flank pain. Musculoskeletal: Negative for back pain and neck pain. Skin: Negative for rash. Neurological: Positive for dizziness. Negative for headaches. Vitals:    06/02/21 0625   BP: (!) 159/81   Pulse: 73   Resp: 12   Temp: 98.1 °F (36.7 °C)   SpO2: 97%            Physical Exam  Constitutional:       Appearance: She is well-developed. HENT:      Head: Normocephalic. Eyes:      Conjunctiva/sclera: Conjunctivae normal.   Cardiovascular:      Rate and Rhythm: Normal rate and regular rhythm. Pulmonary:      Effort: Pulmonary effort is normal. No respiratory distress. Breath sounds: Normal breath sounds. Abdominal:      General: Bowel sounds are normal.      Palpations: Abdomen is soft. Tenderness: There is no abdominal tenderness. Musculoskeletal:         General: Normal range of motion. Cervical back: Normal range of motion and neck supple. Skin:     General: Skin is warm. Capillary Refill: Capillary refill takes less than 2 seconds. Findings: No rash. Neurological:      Mental Status: She is alert and oriented to person, place, and time. Comments: No gross motor or sensory deficits          MDM  Number of Diagnoses or Management Options  Chest pain, unspecified type  Essential hypertension  Diagnosis management comments: Patient presenting ER with atypical chest pain. Pain resolved with a dose of nitro. Patient took aspirin at home. Patient followed by Dr. Shae Lacey supposed to have gotten a stress test 1 year ago however has not gotten it done yet.   We will order second troponin if negative will receive stress test.        Amount and/or Complexity of Data Reviewed  Clinical lab tests: reviewed  Tests in the radiology section of CPT®: reviewed      ED Course as of Jun 02 0747 Wed Jun 02, 2021   0650 6:50 AM  EKG: NSR. Rate 66. Normal intervals, normal axis, no ST-elevations or depressions. No signs of ischemia. Interpreted by Milton Moise MD          [ZD]   6292 7:47 AM  7:47 AM  Change of shift. Care of patient signed over to Dr. Gely Arevalo. Bedside handoff complete.    Pending T2 and stress test        [ZD]      ED Course User Index  [ZD] Forest Skiff, MD       Procedures

## 2021-06-02 NOTE — ED NOTES
Pt reports chest pain has resolved at this time, pt reports has some \"tingling in arm that is when the blood pressure machine goes off\" it resolves once the machine is done taking blood pressure.

## 2021-06-02 NOTE — ED NOTES
Verbal/Bedside report was given to [facility], RN who will assume care of patient at this time. SBAR report consisted of ED summary, MAR, recent results, and additional pertinent information. Receiving nurse given opportunity to ask questions and seek clarifications. Receiving nurse aware of pending lab results and orders that are to be completed.

## 2021-06-02 NOTE — ED NOTES
Bedside and Verbal shift change report given to baldemar (oncoming nurse) by Shyana Walsh (offgoing nurse). Report included the following information SBAR, ED Summary and MAR.

## 2021-06-09 ENCOUNTER — OFFICE VISIT (OUTPATIENT)
Dept: INTERNAL MEDICINE CLINIC | Age: 81
End: 2021-06-09
Payer: MEDICARE

## 2021-06-09 VITALS
HEART RATE: 74 BPM | WEIGHT: 190.2 LBS | RESPIRATION RATE: 13 BRPM | HEIGHT: 65 IN | BODY MASS INDEX: 31.69 KG/M2 | DIASTOLIC BLOOD PRESSURE: 73 MMHG | SYSTOLIC BLOOD PRESSURE: 144 MMHG | OXYGEN SATURATION: 97 %

## 2021-06-09 DIAGNOSIS — I10 BENIGN ESSENTIAL HTN: ICD-10-CM

## 2021-06-09 DIAGNOSIS — Z00.00 MEDICARE ANNUAL WELLNESS VISIT, SUBSEQUENT: Primary | ICD-10-CM

## 2021-06-09 DIAGNOSIS — R07.89 CHEST PRESSURE: ICD-10-CM

## 2021-06-09 DIAGNOSIS — H25.019 CORTICAL AGE-RELATED CATARACT, UNSPECIFIED LATERALITY: ICD-10-CM

## 2021-06-09 DIAGNOSIS — Z23 ENCOUNTER FOR IMMUNIZATION: ICD-10-CM

## 2021-06-09 DIAGNOSIS — M25.552 LEFT HIP PAIN: ICD-10-CM

## 2021-06-09 PROCEDURE — G8417 CALC BMI ABV UP PARAM F/U: HCPCS | Performed by: INTERNAL MEDICINE

## 2021-06-09 PROCEDURE — 1101F PT FALLS ASSESS-DOCD LE1/YR: CPT | Performed by: INTERNAL MEDICINE

## 2021-06-09 PROCEDURE — G8536 NO DOC ELDER MAL SCRN: HCPCS | Performed by: INTERNAL MEDICINE

## 2021-06-09 PROCEDURE — 90732 PPSV23 VACC 2 YRS+ SUBQ/IM: CPT | Performed by: INTERNAL MEDICINE

## 2021-06-09 PROCEDURE — G8754 DIAS BP LESS 90: HCPCS | Performed by: INTERNAL MEDICINE

## 2021-06-09 PROCEDURE — G0439 PPPS, SUBSEQ VISIT: HCPCS | Performed by: INTERNAL MEDICINE

## 2021-06-09 PROCEDURE — G8753 SYS BP > OR = 140: HCPCS | Performed by: INTERNAL MEDICINE

## 2021-06-09 PROCEDURE — G8510 SCR DEP NEG, NO PLAN REQD: HCPCS | Performed by: INTERNAL MEDICINE

## 2021-06-09 PROCEDURE — G8427 DOCREV CUR MEDS BY ELIG CLIN: HCPCS | Performed by: INTERNAL MEDICINE

## 2021-06-09 RX ORDER — LOSARTAN POTASSIUM 25 MG/1
25 TABLET ORAL DAILY
Qty: 90 TABLET | Refills: 0 | Status: SHIPPED | OUTPATIENT
Start: 2021-06-09 | End: 2022-03-28 | Stop reason: ALTCHOICE

## 2021-06-09 RX ORDER — TRIAMCINOLONE ACETONIDE 1 MG/G
CREAM TOPICAL
COMMUNITY
End: 2021-06-09 | Stop reason: ALTCHOICE

## 2021-06-09 RX ORDER — CHOLECALCIFEROL (VITAMIN D3) 125 MCG
CAPSULE ORAL
COMMUNITY
End: 2022-03-28 | Stop reason: ALTCHOICE
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
## 2021-08-24 ENCOUNTER — TELEPHONE (OUTPATIENT)
Dept: INTERNAL MEDICINE CLINIC | Age: 81
End: 2021-08-24

## 2021-08-24 NOTE — TELEPHONE ENCOUNTER
Return call made to patient to get more information. When reviewing chart previous colonoscopy not provided in chart. I asked patient for information regarding provider that did her last colonoscopy patient could not provide information and stated that she will have to look for it and get back with me. I advised patient that she can call me back with the information then I can request last colonoscopy for provider to review.

## 2021-08-24 NOTE — TELEPHONE ENCOUNTER
----- Message from Jackeline Stephenson sent at 8/24/2021 12:49 PM EDT -----  Regarding: Dr. Farhat Dominguez Message/Vendor Calls    Caller's first and last name:  N/A      Reason for call: Requesting a Referral for a Colonoscopy      Callback required yes/no and why:  Yes      Best contact number(s):[phone] or [phone]      Details to clarify the request: Please call the patient for referral.      Jackeline Stephenson

## 2022-01-25 ENCOUNTER — TELEPHONE (OUTPATIENT)
Dept: CARDIOLOGY CLINIC | Age: 82
End: 2022-01-25

## 2022-01-25 NOTE — TELEPHONE ENCOUNTER
Spoke with patient. Notified her of normal stress test results from 6/2/21. No further concerns voiced. Declined follow up scheduling. Understanding expressed.

## 2022-03-18 PROBLEM — S72.009A HIP FRACTURE (HCC): Status: ACTIVE | Noted: 2019-06-15

## 2022-03-18 PROBLEM — E78.00 HYPERCHOLESTEROLEMIA: Status: ACTIVE | Noted: 2019-08-23

## 2022-03-19 PROBLEM — G89.29 CHRONIC PAIN: Status: ACTIVE | Noted: 2020-04-23

## 2022-03-19 PROBLEM — J30.9 ALLERGIC RHINITIS: Status: ACTIVE | Noted: 2020-04-23

## 2022-03-19 PROBLEM — D72.829 LEUKOCYTOSIS: Status: ACTIVE | Noted: 2019-06-15

## 2022-03-19 PROBLEM — M85.80 OSTEOPENIA: Status: ACTIVE | Noted: 2020-04-23

## 2022-03-19 PROBLEM — I10 BENIGN ESSENTIAL HTN: Status: ACTIVE | Noted: 2017-10-05

## 2022-03-20 PROBLEM — G43.909 MIGRAINE: Status: ACTIVE | Noted: 2020-04-23

## 2022-03-28 ENCOUNTER — OFFICE VISIT (OUTPATIENT)
Dept: INTERNAL MEDICINE CLINIC | Age: 82
End: 2022-03-28
Payer: MEDICARE

## 2022-03-28 VITALS
WEIGHT: 188.4 LBS | RESPIRATION RATE: 12 BRPM | BODY MASS INDEX: 31.39 KG/M2 | SYSTOLIC BLOOD PRESSURE: 161 MMHG | HEIGHT: 65 IN | OXYGEN SATURATION: 98 % | HEART RATE: 72 BPM | TEMPERATURE: 98.1 F | DIASTOLIC BLOOD PRESSURE: 73 MMHG

## 2022-03-28 DIAGNOSIS — R53.83 FATIGUE, UNSPECIFIED TYPE: ICD-10-CM

## 2022-03-28 DIAGNOSIS — Z80.0 FAMILY HISTORY OF COLORECTAL CANCER: ICD-10-CM

## 2022-03-28 DIAGNOSIS — F43.9 STRESS AT HOME: ICD-10-CM

## 2022-03-28 DIAGNOSIS — I10 BENIGN ESSENTIAL HTN: Primary | ICD-10-CM

## 2022-03-28 PROCEDURE — G8427 DOCREV CUR MEDS BY ELIG CLIN: HCPCS | Performed by: INTERNAL MEDICINE

## 2022-03-28 PROCEDURE — G8432 DEP SCR NOT DOC, RNG: HCPCS | Performed by: INTERNAL MEDICINE

## 2022-03-28 PROCEDURE — G8536 NO DOC ELDER MAL SCRN: HCPCS | Performed by: INTERNAL MEDICINE

## 2022-03-28 PROCEDURE — G8417 CALC BMI ABV UP PARAM F/U: HCPCS | Performed by: INTERNAL MEDICINE

## 2022-03-28 PROCEDURE — 1090F PRES/ABSN URINE INCON ASSESS: CPT | Performed by: INTERNAL MEDICINE

## 2022-03-28 PROCEDURE — G8754 DIAS BP LESS 90: HCPCS | Performed by: INTERNAL MEDICINE

## 2022-03-28 PROCEDURE — 99214 OFFICE O/P EST MOD 30 MIN: CPT | Performed by: INTERNAL MEDICINE

## 2022-03-28 PROCEDURE — 1101F PT FALLS ASSESS-DOCD LE1/YR: CPT | Performed by: INTERNAL MEDICINE

## 2022-03-28 PROCEDURE — G0463 HOSPITAL OUTPT CLINIC VISIT: HCPCS | Performed by: INTERNAL MEDICINE

## 2022-03-28 PROCEDURE — G8753 SYS BP > OR = 140: HCPCS | Performed by: INTERNAL MEDICINE

## 2022-03-28 NOTE — Clinical Note
Can you call and schedule her with Dr. Kalia Barragan for follow-up in 1 month as we discussed during my visit. She did not make this appointment before leaving.

## 2022-03-28 NOTE — PROGRESS NOTES
Elli Westbrook  Identified pt with two pt identifiers(name and ). Chief Complaint   Patient presents with    Follow-up     RM18// pt wants to discuss on going issues       1. Have you been to the ER, urgent care clinic since your last visit? Hospitalized since your last visit? NO    2. Have you seen or consulted any other health care providers outside of the [address] [place] [address] since your last visit? Include any pap smears or colon screening. NO      Provider notified of reason for visit, vitals and flowsheets obtained on patients.      Patient received paperwork for advance directive during previous visit but has not completed at this time     Reviewed record In preparation for visit, huddled with provider and have obtained necessary documentation      Health Maintenance Due   Topic    DTaP/Tdap/Td series (1 - Tdap)    Shingrix Vaccine Age 50> (1 of 2)    Flu Vaccine (1)       Wt Readings from Last 3 Encounters:   22 188 lb 6.4 oz (85.5 kg)   21 190 lb 3.2 oz (86.3 kg)   21 183 lb (83 kg)     Temp Readings from Last 3 Encounters:   22 98.1 °F (36.7 °C) (Oral)   21 98.1 °F (36.7 °C)   10/05/20 97.9 °F (36.6 °C) (Oral)     BP Readings from Last 3 Encounters:   22 (!) 161/73   21 (!) 144/73   21 (!) 142/68     Pulse Readings from Last 3 Encounters:   22 72   21 74   21 66     Vitals:    22 0859   BP: (!) 161/73   Pulse: 72   Resp: 12   Temp: 98.1 °F (36.7 °C)   TempSrc: Oral   SpO2: 98%   Weight: 188 lb 6.4 oz (85.5 kg)   Height: 5' 5\" (1.651 m)         Learning Assessment:  :     Learning Assessment 2018   PRIMARY LEARNER Patient   PRIMARY LANGUAGE ENGLISH   LEARNER PREFERENCE PRIMARY DEMONSTRATION   ANSWERED BY Patient   RELATIONSHIP SELF       Depression Screening:  :     3 most recent PHQ Screens 2021   Little interest or pleasure in doing things Not at all   Feeling down, depressed, irritable, or hopeless Not at all Total Score PHQ 2 0       Fall Risk Assessment:  :     Fall Risk Assessment, last 12 mths 6/9/2021   Able to walk? Yes   Fall in past 12 months? 0   Do you feel unsteady? 0   Are you worried about falling 0       Abuse Screening:  :     Abuse Screening Questionnaire 6/9/2021 8/23/2019 7/26/2019 4/19/2018   Do you ever feel afraid of your partner? N N N N   Are you in a relationship with someone who physically or mentally threatens you? N N N N   Is it safe for you to go home? Y Y Y Y       ADL Screening:  :     ADL Assessment 6/9/2021   Feeding yourself No Help Needed   Getting from bed to chair No Help Needed   Getting dressed No Help Needed   Bathing or showering No Help Needed   Walk across the room (includes cane/walker) No Help Needed   Using the telphone No Help Needed   Taking your medications No Help Needed   Preparing meals No Help Needed   Managing money (expenses/bills) No Help Needed   Moderately strenuous housework (laundry) No Help Needed   Shopping for personal items (toiletries/medicines) No Help Needed   Shopping for groceries No Help Needed   Driving No Help Needed   Climbing a flight of stairs No Help Needed   Getting to places beyond walking distances No Help Needed         Medication reconciliation up to date and corrected with patient at this time.

## 2022-03-28 NOTE — PROGRESS NOTES
Reji Ramachandran is a [de-identified] y.o. female who presents today for Follow-up (RM18// pt wants to discuss on going issues)  . 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, T50.A95A    Osteoporosis M81.0    History of nephrolithiasis Z87.442    Primary osteoarthritis of right knee M17.11    Hypercholesterolemia E78.00    Allergic rhinitis J30.9    Back pain M54.9    Chronic pain G89.29    Difficulty walking R26.2    History of total knee replacement Z96.659    Impairment of balance R26.89    Migraine G43.909    Neoplasm of uncertain behavior of skin D48.5    Nontoxic uninodular goiter E04.1    Osteopenia M85.80    Other malaise and fatigue R53.81, R53.83    SBE (subacute bacterial endocarditis) prophylaxis candidate Z29.8    Vitamin D deficiency E55.9   . Today patient is here for an acute visit. she does have other concerns. Comes in with multiple different concerns. We discussed addressing blood pressure today and then deferring other issues back to her PCP    She is under a lot of stress with her . Appears that he is having progressive memory issues and some hallucinations. We discussed that she needs to get more help at home and will talk to his PCP. She is asking for other referrals and orders. Again, we discussed with her the importance of following back up with her PCP. Reports she does have a family history of colorectal cancer and would like another colonoscopy. Hypertension-has been an ongoing issue. It appears that low-dose ARB is causing a lot of side effects.   Unclear what her home blood pressure readings are. She is not currently monitoring them. We discussed the importance of monitoring home blood pressures to make sure that she needs blood pressure medication. reports that she has never smoked. She has never used smokeless tobacco.    reports previous alcohol use of about 2.0 standard drinks of alcohol per week. BP Readings from Last 2 Encounters:   03/28/22 (!) 161/73   06/09/21 (!) 144/73         ROS  Review of Systems   Constitutional: Positive for malaise/fatigue. Negative for chills, fever and weight loss. HENT: Negative for congestion and sore throat. Eyes: Negative for blurred vision, double vision and photophobia. Respiratory: Positive for cough (mild). Negative for shortness of breath. Cardiovascular: Negative for chest pain, palpitations and leg swelling. Gastrointestinal: Negative for abdominal pain, constipation, diarrhea, heartburn, nausea and vomiting. Genitourinary: Negative for dysuria, frequency and urgency. Musculoskeletal: Negative for back pain, joint pain and myalgias. Skin: Negative for rash. Neurological: Negative. Negative for headaches. Endo/Heme/Allergies: Does not bruise/bleed easily. Psychiatric/Behavioral: Negative for memory loss and suicidal ideas. The patient is nervous/anxious (Due to her ). Visit Vitals  BP (!) 161/73 (BP 1 Location: Left upper arm, BP Patient Position: Sitting, BP Cuff Size: Large adult)   Pulse 72   Temp 98.1 °F (36.7 °C) (Oral)   Resp 12   Ht 5' 5\" (1.651 m)   Wt 188 lb 6.4 oz (85.5 kg)   SpO2 98%   BMI 31.35 kg/m²       Physical Exam  Constitutional:       Appearance: She is well-developed. HENT:      Head: Normocephalic and atraumatic. Cardiovascular:      Rate and Rhythm: Normal rate and regular rhythm. Heart sounds: No murmur heard. Pulmonary:      Effort: Pulmonary effort is normal. No respiratory distress. Skin:     General: Skin is warm and dry.    Neurological:      Mental Status: She is alert and oriented to person, place, and time. Psychiatric:         Behavior: Behavior normal.      Comments: Appears anxious. Thoughts are linear but she does have multiple complaints. Current Outpatient Medications   Medication Sig    cholecalciferol, vitamin D3, (Vitamin D3) 50 mcg (2,000 unit) tab Take  by mouth. 2 tabs a day    losartan (COZAAR) 25 mg tablet Take 1 Tablet by mouth daily. Re-start 6/9/21 (lower dose)    multivit,iron,minerals/lutein (CENTRUM SILVER ULTRA WOMEN'S PO) Take  by mouth.  calcium-vitamin D (Oyster Shell Calcium-Vit D3) 250 mg-125 unit per tablet Take 1 Tab by mouth daily. No current facility-administered medications for this visit. Past Medical History:   Diagnosis Date    Benign essential HTN 10/05/2017    declines tx    Chest pain     nuclear stress test 6/2/21    History of breast cancer 11/2004    right  - s/p lumpectomy w sentinal node 11/2004. s/p XRT 1 mo.  could not tolerate tamoxifen    History of nephrolithiasis     IBS (irritable bowel syndrome)     Left femoral shaft fracture ([address] 06/16/2019    Pinning Dr. Sherly Alvarez    Osteoporosis 01/2019    T- 3.3 lumbar spine, T- 2.4 left radius, T- 2.8 of left femoral neck and T- 2.3 right femoral neck. 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   last 9/2017      Past Surgical History:   Procedure Laterality Date    HX CHOLECYSTECTOMY      HX COLONOSCOPY  2016    states benign polyp - repeat 2019    HX HIP FRACTURE TX Left 06/16/2019    HX KNEE ARTHROSCOPY Right 04/11/2017    partial knee replacement in [place]    HX KNEE REPLACEMENT      bilat knee replacement    HX THYROIDECTOMY  2004    nodules, goiter      Social History     Tobacco Use    Smoking status: Never Smoker    Smokeless tobacco: Never Used Substance Use Topics    Alcohol use: Not Currently     Alcohol/week: 2.0 standard drinks     Types: 2 Glasses of wine per week      Family History   Problem Relation Age of Onset    Stroke Mother     Heart Disease Father     Diabetes Father     Thyroid Cancer Brother         Allergies   Allergen Reactions    Hydrocortisone Other (comments)     throat closed up, ear felt clogged. throat closed up, ear felt clogged.  Lidocaine Unknown (comments)     \"throat swells up\"  However patient says this is a distant and unclear memory. She has had many dental procedures under local anesthetetic without problem.  Metronidazole Other (comments)        Assessment/Plan  Diagnoses and all orders for this visit:    1. Benign essential HTN-patient presents today with multiple concerns. I believe that she is under a lot of stress regarding the care for her . She will try to get this addressed with his PCP and come up with a solution. In regards to her blood pressure, we discussed the importance of monitoring this at home. We discussed monitoring blood pressures for a month and coming in to see her PCP with these readings. She has not tolerated therapies and therefore I would keep her off these medications. Check thyroid levels. Routine health maintenance orders can be done through PCP, but we will give her referral to gastroenterology given her family history. She is agreeable to this plan. -     METABOLIC PANEL, COMPREHENSIVE; Future  -     CBC WITH AUTOMATED DIFF; Future    2. Stress at home    3. Fatigue, unspecified type  -     TSH 3RD GENERATION; Future    4. Family history of colorectal cancer  -     REFERRAL TO GASTROENTEROLOGY            Tura Collet, MD  3/28/2022    This note was created with the help of speech recognition software Handle) and may contain some 'sound alike' errors.

## 2022-03-30 ENCOUNTER — PATIENT MESSAGE (OUTPATIENT)
Dept: INTERNAL MEDICINE CLINIC | Age: 82
End: 2022-03-30

## 2022-03-30 LAB
ALBUMIN SERPL-MCNC: 3.9 G/DL (ref 3.5–5)
ALBUMIN/GLOB SERPL: 1.4 {RATIO} (ref 1.1–2.2)
ALP SERPL-CCNC: 90 U/L (ref 45–117)
ALT SERPL-CCNC: 23 U/L (ref 12–78)
ANION GAP SERPL CALC-SCNC: 5 MMOL/L (ref 5–15)
AST SERPL-CCNC: 17 U/L (ref 15–37)
BASOPHILS # BLD: 0 K/UL (ref 0–0.1)
BASOPHILS NFR BLD: 0 % (ref 0–1)
BILIRUB SERPL-MCNC: 0.3 MG/DL (ref 0.2–1)
BUN SERPL-MCNC: 20 MG/DL (ref 6–20)
BUN/CREAT SERPL: 28 (ref 12–20)
CALCIUM SERPL-MCNC: 10.2 MG/DL (ref 8.5–10.1)
CHLORIDE SERPL-SCNC: 105 MMOL/L (ref 97–108)
CO2 SERPL-SCNC: 29 MMOL/L (ref 21–32)
CREAT SERPL-MCNC: 0.72 MG/DL (ref 0.55–1.02)
DIFFERENTIAL METHOD BLD: NORMAL
EOSINOPHIL # BLD: 0.3 K/UL (ref 0–0.4)
EOSINOPHIL NFR BLD: 4 % (ref 0–7)
ERYTHROCYTE [DISTWIDTH] IN BLOOD BY AUTOMATED COUNT: 13.2 % (ref 11.5–14.5)
GLOBULIN SER CALC-MCNC: 2.7 G/DL (ref 2–4)
GLUCOSE SERPL-MCNC: 97 MG/DL (ref 65–100)
HCT VFR BLD AUTO: 39.8 % (ref 35–47)
HGB BLD-MCNC: 12.5 G/DL (ref 11.5–16)
IMM GRANULOCYTES # BLD AUTO: 0 K/UL (ref 0–0.04)
IMM GRANULOCYTES NFR BLD AUTO: 0 % (ref 0–0.5)
LYMPHOCYTES # BLD: 2.5 K/UL (ref 0.8–3.5)
LYMPHOCYTES NFR BLD: 36 % (ref 12–49)
MCH RBC QN AUTO: 29.8 PG (ref 26–34)
MCHC RBC AUTO-ENTMCNC: 31.4 G/DL (ref 30–36.5)
MCV RBC AUTO: 95 FL (ref 80–99)
MONOCYTES # BLD: 0.6 K/UL (ref 0–1)
MONOCYTES NFR BLD: 8 % (ref 5–13)
NEUTS SEG # BLD: 3.5 K/UL (ref 1.8–8)
NEUTS SEG NFR BLD: 52 % (ref 32–75)
NRBC # BLD: 0 K/UL (ref 0–0.01)
NRBC BLD-RTO: 0 PER 100 WBC
PLATELET # BLD AUTO: 281 K/UL (ref 150–400)
PMV BLD AUTO: 10.1 FL (ref 8.9–12.9)
POTASSIUM SERPL-SCNC: 4.6 MMOL/L (ref 3.5–5.1)
PROT SERPL-MCNC: 6.6 G/DL (ref 6.4–8.2)
RBC # BLD AUTO: 4.19 M/UL (ref 3.8–5.2)
SODIUM SERPL-SCNC: 139 MMOL/L (ref 136–145)
TSH SERPL DL<=0.05 MIU/L-ACNC: 0.94 UIU/ML (ref 0.36–3.74)
WBC # BLD AUTO: 6.8 K/UL (ref 3.6–11)

## 2022-04-08 ENCOUNTER — APPOINTMENT (OUTPATIENT)
Dept: GENERAL RADIOLOGY | Age: 82
End: 2022-04-08
Attending: NURSE PRACTITIONER
Payer: MEDICARE

## 2022-04-08 ENCOUNTER — HOSPITAL ENCOUNTER (EMERGENCY)
Age: 82
Discharge: HOME | End: 2022-04-08
Attending: EMERGENCY MEDICINE
Payer: MEDICARE

## 2022-04-08 ENCOUNTER — APPOINTMENT (OUTPATIENT)
Dept: CT IMAGING | Age: 82
End: 2022-04-08
Attending: NURSE PRACTITIONER
Payer: MEDICARE

## 2022-04-08 VITALS
HEART RATE: 61 BPM | DIASTOLIC BLOOD PRESSURE: 89 MMHG | HEIGHT: 65 IN | RESPIRATION RATE: 18 BRPM | BODY MASS INDEX: 30.49 KG/M2 | SYSTOLIC BLOOD PRESSURE: 155 MMHG | OXYGEN SATURATION: 99 % | WEIGHT: 183 LBS | TEMPERATURE: 97.5 F

## 2022-04-08 DIAGNOSIS — S42.492A OTHER CLOSED DISPLACED FRACTURE OF DISTAL END OF LEFT HUMERUS, INITIAL ENCOUNTER: Primary | ICD-10-CM

## 2022-04-08 PROCEDURE — 99284 EMERGENCY DEPT VISIT MOD MDM: CPT

## 2022-04-08 PROCEDURE — 73080 X-RAY EXAM OF ELBOW: CPT

## 2022-04-08 PROCEDURE — 74011250636 HC RX REV CODE- 250/636: Performed by: NURSE PRACTITIONER

## 2022-04-08 PROCEDURE — 96375 TX/PRO/DX INJ NEW DRUG ADDON: CPT

## 2022-04-08 PROCEDURE — 73200 CT UPPER EXTREMITY W/O DYE: CPT

## 2022-04-08 PROCEDURE — 96374 THER/PROPH/DIAG INJ IV PUSH: CPT

## 2022-04-08 PROCEDURE — 75810000053 HC SPLINT APPLICATION

## 2022-04-08 PROCEDURE — 74011250637 HC RX REV CODE- 250/637: Performed by: NURSE PRACTITIONER

## 2022-04-08 RX ORDER — KETOROLAC TROMETHAMINE 30 MG/ML
15 INJECTION, SOLUTION INTRAMUSCULAR; INTRAVENOUS
Status: COMPLETED | OUTPATIENT
Start: 2022-04-08 | End: 2022-04-08

## 2022-04-08 RX ORDER — NAPROXEN 500 MG/1
500 TABLET ORAL
Qty: 30 TABLET | Refills: 0 | Status: SHIPPED | OUTPATIENT
Start: 2022-04-08 | End: 2022-04-23

## 2022-04-08 RX ORDER — IBUPROFEN 600 MG/1
600 TABLET ORAL
Status: DISCONTINUED | OUTPATIENT
Start: 2022-04-08 | End: 2022-04-08

## 2022-04-08 RX ORDER — MORPHINE SULFATE 4 MG/ML
4 INJECTION INTRAVENOUS
Status: COMPLETED | OUTPATIENT
Start: 2022-04-08 | End: 2022-04-08

## 2022-04-08 RX ORDER — HYDROCODONE BITARTRATE AND ACETAMINOPHEN 7.5; 325 MG/1; MG/1
1 TABLET ORAL
Status: COMPLETED | OUTPATIENT
Start: 2022-04-08 | End: 2022-04-08

## 2022-04-08 RX ORDER — HYDROCODONE BITARTRATE AND ACETAMINOPHEN 5; 325 MG/1; MG/1
1 TABLET ORAL
Status: DISCONTINUED | OUTPATIENT
Start: 2022-04-08 | End: 2022-04-08

## 2022-04-08 RX ORDER — HYDROCODONE BITARTRATE AND ACETAMINOPHEN 5; 325 MG/1; MG/1
1 TABLET ORAL
Qty: 10 TABLET | Refills: 0 | Status: SHIPPED | OUTPATIENT
Start: 2022-04-08 | End: 2022-04-11

## 2022-04-08 RX ADMIN — MORPHINE SULFATE 4 MG: 4 INJECTION, SOLUTION INTRAMUSCULAR; INTRAVENOUS at 18:49

## 2022-04-08 RX ADMIN — HYDROCODONE BITARTRATE AND ACETAMINOPHEN 1 TABLET: 7.5; 325 TABLET ORAL at 20:51

## 2022-04-08 RX ADMIN — KETOROLAC TROMETHAMINE 15 MG: 30 INJECTION, SOLUTION INTRAMUSCULAR at 18:49

## 2022-04-08 NOTE — ED TRIAGE NOTES
Pt arrives stating that she fell in the garden. [facility] to [facility] States that she believes may be broken based on their x rays. Doesn't take any medications.

## 2022-04-08 NOTE — ED PROVIDER NOTES
[de-identified] female with past medical history of hypertension, breast CA s/p lumpectomy, IBS, and anxiety presents to the ER today for evaluation of acute left elbow pain after a mechanical fall that occurred earlier this afternoon. Patient states that she was gardening today when she accidentally fell backwards and landed directly onto her left elbow. She was in urgent care facility who completed x-rays that were somewhat inconclusive. They were attempting to splint her when she became acutely nauseous and dizzy, and they referred her here for further management. Patient believes that her nauseousness and dizziness was secondary to the amount of pain that she was in; she states that with her arm immobilized her pain is minimal and does not trigger any of her aforementioned symptoms. Past Medical History:   Diagnosis Date    Benign essential HTN 10/05/2017    declines tx    Chest pain     nuclear stress test 6/2/21    History of breast cancer 11/2004    right  - s/p lumpectomy w sentinal node 11/2004. s/p XRT 1 mo.  could not tolerate tamoxifen    History of nephrolithiasis     IBS (irritable bowel syndrome)     Left femoral shaft fracture ([address] 06/16/2019    Pinning Dr. Mackenzie Ann    Osteoporosis 01/2019    T- 3.3 lumbar spine, T- 2.4 left radius, T- 2.8 of left femoral neck and T- 2.3 right femoral neck. 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   last 9/2017       Past Surgical History:   Procedure Laterality Date    HX CHOLECYSTECTOMY      HX COLONOSCOPY  2016    states benign polyp - repeat 2019    HX HIP FRACTURE TX Left 06/16/2019    HX KNEE ARTHROSCOPY Right 04/11/2017    partial knee replacement in [place]    HX KNEE REPLACEMENT      bilat knee replacement    HX THYROIDECTOMY  2004    nodules, goiter Family History:   Problem Relation Age of Onset    Stroke Mother     Heart Disease Father     Diabetes Father     Thyroid Cancer Brother        Social History     Socioeconomic History    Marital status:      Spouse name: Not on file    Number of children: Not on file    Years of education: Not on file    Highest education level: Not on file   Occupational History    Not on file   Tobacco Use    Smoking status: Never Smoker    Smokeless tobacco: Never Used   Substance and Sexual Activity    Alcohol use: Not Currently     Alcohol/week: 2.0 standard drinks     Types: 2 Glasses of wine per week    Drug use: No    Sexual activity: Never   Other Topics Concern    Not on file   Social History Narrative    Not on file     Social Determinants of Health     Financial Resource Strain:     Difficulty of Paying Living Expenses: Not on file   Food Insecurity:     Worried About Running Out of Food in the Last Year: Not on file    [place] of Food in the Last Year: Not on file   Transportation Needs:     Lack of Transportation (Medical): Not on file    Lack of Transportation (Non-Medical):  Not on file   Physical Activity:     Days of Exercise per Week: Not on file    Minutes of Exercise per Session: Not on file   Stress:     Feeling of Stress : Not on file   Social Connections:     Frequency of Communication with Friends and Family: Not on file    Frequency of Social Gatherings with Friends and Family: Not on file    Attends Mandaen Services: Not on file    Active Member of Clubs or Organizations: Not on file    Attends Club or Organization Meetings: Not on file    Marital Status: Not on file   Intimate Partner Violence:     Fear of Current or Ex-Partner: Not on file    Emotionally Abused: Not on file    Physically Abused: Not on file    Sexually Abused: Not on file   Housing Stability:     Unable to Pay for Housing in the Last Year: Not on file    Number of [place] in the Last Year: Not on file    Unstable Housing in the Last Year: Not on file         ALLERGIES: Hydrocortisone, Lidocaine, and Metronidazole    Review of Systems   Constitutional: Negative for fever. HENT: Negative for sore throat. Eyes: Negative for visual disturbance. Respiratory: Negative for shortness of breath. Cardiovascular: Negative for palpitations. Gastrointestinal: Negative for vomiting. Genitourinary: Negative for dysuria. Musculoskeletal: Positive for arthralgias. Skin: Negative for rash. Neurological: Positive for weakness. Negative for dizziness. Psychiatric/Behavioral: Negative for dysphoric mood. Vitals:    04/08/22 1813   BP: (!) 155/89   Pulse: 61   Resp: 18   Temp: 97.5 °F (36.4 °C)   SpO2: 99%   Weight: 83 kg (183 lb)   Height: 5' 5\" (1.651 m)            Physical Exam  Vitals and nursing note reviewed. Constitutional:       General: She is not in acute distress. Appearance: Normal appearance. She is not ill-appearing. HENT:      Head: Normocephalic. Nose: Nose normal.   Eyes:      General: No scleral icterus. Extraocular Movements: Extraocular movements intact. Cardiovascular:      Rate and Rhythm: Normal rate and regular rhythm. Pulmonary:      Effort: Pulmonary effort is normal.   Abdominal:      General: There is no distension. Tenderness: There is no guarding. Musculoskeletal:      Left shoulder: Normal.      Left elbow: Decreased range of motion. Tenderness present. Left forearm: Normal.      Left wrist: Normal.      Cervical back: Normal range of motion and neck supple. Skin:     General: Skin is warm and dry. Neurological:      Mental Status: She is alert and oriented to person, place, and time. Mental status is at baseline. Psychiatric:         Mood and Affect: Mood normal.         Behavior: Behavior normal.         Thought Content:  Thought content normal.         Judgment: Judgment normal.          MDM      VITAL SIGNS:  Patient Vitals for the past 4 hrs:   Temp Pulse Resp BP SpO2   04/08/22 1813 97.5 °F (36.4 °C) 61 18 (!) 155/89 99 %         LABS:  No results found for this or any previous visit (from the past 6 hour(s)). IMAGING:  XR ELBOW LT MIN 3 V   Final Result      Mildly displaced supracondylar distal humerus fracture. CT UP EXT LT WO CONT    (Results Pending)         Medications During Visit:  Medications   HYDROcodone-acetaminophen (NORCO) 7.5-325 mg per tablet 1 Tablet (has no administration in time range)   morphine injection 4 mg (4 mg IntraVENous Given 4/8/22 1849)   ketorolac (TORADOL) injection 15 mg (15 mg IntraVENous Given 4/8/22 1849)         DECISION MAKING:  Bethany Magdaleno is a [de-identified] y.o. female who comes in as above. Work-up remarkable for distal humeral fracture. Discussed case with orthopedic surgery who recommended adding on a noncontrasted CT, long-arm posterior splint, sling, and follow-up Monday morning in the office. The clinical decision making for this encounter included ordering and interpreting the above diagnostic tests with comparison to prior studies that are within our EMR. Past medical and surgical histories were reviewed, as were records from recent outpatient and emergency department visits. The above results discussed and reviewed with the patient. Patient verbalized understanding of the care plan, including any changes to current outpatient medication regimen, discussed disease process, symptom control, and follow-up care. Return precautions reviewed. IMPRESSION:  1. Other closed displaced fracture of distal end of left humerus, initial encounter        DISPOSITION:  Discharged      Current Discharge Medication List      START taking these medications    Details   HYDROcodone-acetaminophen (Lorcet, HYDROcodone,) 5-325 mg per tablet Take 1 Tablet by mouth every six (6) hours as needed for Pain for up to 3 days. Max Daily Amount: 4 Tablets.   Qty: 10 Tablet, Refills: 0  Start date: 4/8/2022, End date: 4/11/2022    Associated Diagnoses: Other closed displaced fracture of distal end of left humerus, initial encounter      naproxen (NAPROSYN) 500 mg tablet Take 1 Tablet by mouth two (2) times daily as needed for Pain for up to 15 days. Qty: 30 Tablet, Refills: 0  Start date: 4/8/2022, End date: 4/23/2022              Follow-up Information     Follow up With Specialties Details Why Contact Info    Darin Guo MD Orthopedic Surgery Go in 3 days Please show up around 8 AM for your appointment with Dr. Cyndee Brandon [address] [place] [address] Drive  [phone]              The patient is asked to follow-up with their primary care provider in the next several days. They are to call tomorrow for an appointment. The patient is asked to return promptly for any increased concerns or worsening of symptoms. They can return to this emergency department or any other emergency department.       Procedures

## 2022-04-13 ENCOUNTER — TELEPHONE (OUTPATIENT)
Dept: INTERNAL MEDICINE CLINIC | Age: 82
End: 2022-04-13

## 2022-04-15 ENCOUNTER — TELEPHONE (OUTPATIENT)
Dept: INTERNAL MEDICINE CLINIC | Age: 82
End: 2022-04-15

## 2022-04-15 NOTE — TELEPHONE ENCOUNTER
Spoke with Umu Johns and he reports that patient is in the [facility] for a broken arm. He says they tried to do surgery but her B/P was too high. She will be there over the weekend until they can get her B/P under control to do surgery. Asked him to relay to her that her appt on 4/26/22 was changed to 1:00 PM. He states understanding and grateful for the call.

## 2022-06-22 ENCOUNTER — PATIENT MESSAGE (OUTPATIENT)
Dept: INTERNAL MEDICINE CLINIC | Age: 82
End: 2022-06-22

## 2022-06-27 PROBLEM — S42.309A HUMERUS FRACTURE: Status: ACTIVE | Noted: 2022-04-08

## 2022-06-27 NOTE — TELEPHONE ENCOUNTER
----- Message from Rodriguez Hernandez sent at 6/27/2022  1:57 PM EDT -----  Regarding: headache, blood pressure up due to healing of broken elbow,  7 week  in recovery  Thank you.   Rodriguez Hernandez

## 2022-06-27 NOTE — TELEPHONE ENCOUNTER
Spoke with patient and she requests and appt for follow up  Lab work. Scheduled for 6/28/22 at 0800. Grateful for the call.

## 2022-06-28 ENCOUNTER — OFFICE VISIT (OUTPATIENT)
Dept: INTERNAL MEDICINE CLINIC | Age: 82
End: 2022-06-28
Payer: MEDICARE

## 2022-06-28 VITALS
BODY MASS INDEX: 30.22 KG/M2 | TEMPERATURE: 98.3 F | OXYGEN SATURATION: 98 % | DIASTOLIC BLOOD PRESSURE: 82 MMHG | SYSTOLIC BLOOD PRESSURE: 136 MMHG | HEIGHT: 65 IN | WEIGHT: 181.4 LBS | RESPIRATION RATE: 15 BRPM | HEART RATE: 72 BPM

## 2022-06-28 DIAGNOSIS — E53.8 B12 DEFICIENCY: ICD-10-CM

## 2022-06-28 DIAGNOSIS — E61.1 IRON DEFICIENCY: ICD-10-CM

## 2022-06-28 DIAGNOSIS — E55.9 VITAMIN D DEFICIENCY: ICD-10-CM

## 2022-06-28 DIAGNOSIS — I10 BENIGN ESSENTIAL HTN: ICD-10-CM

## 2022-06-28 DIAGNOSIS — R53.83 FATIGUE, UNSPECIFIED TYPE: Primary | ICD-10-CM

## 2022-06-28 DIAGNOSIS — S42.402S CLOSED FRACTURE OF DISTAL END OF LEFT HUMERUS, UNSPECIFIED FRACTURE MORPHOLOGY, SEQUELA: ICD-10-CM

## 2022-06-28 DIAGNOSIS — E83.52 HYPERCALCEMIA: ICD-10-CM

## 2022-06-28 DIAGNOSIS — E04.1 NONTOXIC UNINODULAR GOITER: ICD-10-CM

## 2022-06-28 DIAGNOSIS — Z78.0 ASYMPTOMATIC MENOPAUSAL STATE: ICD-10-CM

## 2022-06-28 DIAGNOSIS — M80.00XA AGE-RELATED OSTEOPOROSIS WITH CURRENT PATHOLOGICAL FRACTURE, INITIAL ENCOUNTER: ICD-10-CM

## 2022-06-28 LAB
25(OH)D3 SERPL-MCNC: 24.4 NG/ML (ref 30–100)
ALBUMIN SERPL-MCNC: 3.9 G/DL (ref 3.5–5)
ALBUMIN/GLOB SERPL: 1.3 {RATIO} (ref 1.1–2.2)
ALP SERPL-CCNC: 115 U/L (ref 45–117)
ALT SERPL-CCNC: 22 U/L (ref 12–78)
ANION GAP SERPL CALC-SCNC: 5 MMOL/L (ref 5–15)
AST SERPL-CCNC: 16 U/L (ref 15–37)
BASOPHILS # BLD: 0 K/UL (ref 0–0.1)
BASOPHILS NFR BLD: 1 % (ref 0–1)
BILIRUB SERPL-MCNC: 0.4 MG/DL (ref 0.2–1)
BUN SERPL-MCNC: 22 MG/DL (ref 6–20)
BUN/CREAT SERPL: 29 (ref 12–20)
CALCIUM SERPL-MCNC: 9.8 MG/DL (ref 8.5–10.1)
CALCIUM SERPL-MCNC: 9.8 MG/DL (ref 8.5–10.1)
CHLORIDE SERPL-SCNC: 106 MMOL/L (ref 97–108)
CHOLEST SERPL-MCNC: 240 MG/DL
CO2 SERPL-SCNC: 28 MMOL/L (ref 21–32)
COMMENT, HOLDF: NORMAL
CREAT SERPL-MCNC: 0.76 MG/DL (ref 0.55–1.02)
DIFFERENTIAL METHOD BLD: NORMAL
EOSINOPHIL # BLD: 0.3 K/UL (ref 0–0.4)
EOSINOPHIL NFR BLD: 4 % (ref 0–7)
ERYTHROCYTE [DISTWIDTH] IN BLOOD BY AUTOMATED COUNT: 13.1 % (ref 11.5–14.5)
FERRITIN SERPL-MCNC: 62 NG/ML (ref 26–388)
GLOBULIN SER CALC-MCNC: 3 G/DL (ref 2–4)
GLUCOSE SERPL-MCNC: 96 MG/DL (ref 65–100)
HCT VFR BLD AUTO: 43 % (ref 35–47)
HDLC SERPL-MCNC: 70 MG/DL
HDLC SERPL: 3.4 {RATIO} (ref 0–5)
HGB BLD-MCNC: 13.6 G/DL (ref 11.5–16)
IMM GRANULOCYTES # BLD AUTO: 0 K/UL (ref 0–0.04)
IMM GRANULOCYTES NFR BLD AUTO: 0 % (ref 0–0.5)
LDLC SERPL CALC-MCNC: 148 MG/DL (ref 0–100)
LYMPHOCYTES # BLD: 2.1 K/UL (ref 0.8–3.5)
LYMPHOCYTES NFR BLD: 31 % (ref 12–49)
MCH RBC QN AUTO: 29.1 PG (ref 26–34)
MCHC RBC AUTO-ENTMCNC: 31.6 G/DL (ref 30–36.5)
MCV RBC AUTO: 92.1 FL (ref 80–99)
MONOCYTES # BLD: 0.5 K/UL (ref 0–1)
MONOCYTES NFR BLD: 7 % (ref 5–13)
NEUTS SEG # BLD: 3.8 K/UL (ref 1.8–8)
NEUTS SEG NFR BLD: 57 % (ref 32–75)
NRBC # BLD: 0 K/UL (ref 0–0.01)
NRBC BLD-RTO: 0 PER 100 WBC
PLATELET # BLD AUTO: 315 K/UL (ref 150–400)
PMV BLD AUTO: 9.8 FL (ref 8.9–12.9)
POTASSIUM SERPL-SCNC: 4.3 MMOL/L (ref 3.5–5.1)
PROT SERPL-MCNC: 6.9 G/DL (ref 6.4–8.2)
PTH-INTACT SERPL-MCNC: 182 PG/ML (ref 18.4–88)
RBC # BLD AUTO: 4.67 M/UL (ref 3.8–5.2)
SAMPLES BEING HELD,HOLD: NORMAL
SODIUM SERPL-SCNC: 139 MMOL/L (ref 136–145)
T3FREE SERPL-MCNC: 3.2 PG/ML (ref 2.2–4)
T4 FREE SERPL-MCNC: 0.9 NG/DL (ref 0.8–1.5)
TRIGL SERPL-MCNC: 110 MG/DL (ref ?–150)
VIT B12 SERPL-MCNC: 464 PG/ML (ref 193–986)
VLDLC SERPL CALC-MCNC: 22 MG/DL
WBC # BLD AUTO: 6.7 K/UL (ref 3.6–11)

## 2022-06-28 PROCEDURE — 1101F PT FALLS ASSESS-DOCD LE1/YR: CPT | Performed by: INTERNAL MEDICINE

## 2022-06-28 PROCEDURE — 1090F PRES/ABSN URINE INCON ASSESS: CPT | Performed by: INTERNAL MEDICINE

## 2022-06-28 PROCEDURE — G8754 DIAS BP LESS 90: HCPCS | Performed by: INTERNAL MEDICINE

## 2022-06-28 PROCEDURE — G8752 SYS BP LESS 140: HCPCS | Performed by: INTERNAL MEDICINE

## 2022-06-28 PROCEDURE — G8417 CALC BMI ABV UP PARAM F/U: HCPCS | Performed by: INTERNAL MEDICINE

## 2022-06-28 PROCEDURE — G0463 HOSPITAL OUTPT CLINIC VISIT: HCPCS | Performed by: INTERNAL MEDICINE

## 2022-06-28 PROCEDURE — 99214 OFFICE O/P EST MOD 30 MIN: CPT | Performed by: INTERNAL MEDICINE

## 2022-06-28 PROCEDURE — G8510 SCR DEP NEG, NO PLAN REQD: HCPCS | Performed by: INTERNAL MEDICINE

## 2022-06-28 PROCEDURE — G8536 NO DOC ELDER MAL SCRN: HCPCS | Performed by: INTERNAL MEDICINE

## 2022-06-28 PROCEDURE — G8427 DOCREV CUR MEDS BY ELIG CLIN: HCPCS | Performed by: INTERNAL MEDICINE

## 2022-06-28 RX ORDER — ASPIRIN 81 MG/1
TABLET ORAL
COMMUNITY

## 2022-06-28 RX ORDER — ALENDRONATE SODIUM 70 MG/1
70 TABLET ORAL
Qty: 4 TABLET | Refills: 5 | Status: SHIPPED | OUTPATIENT
Start: 2022-06-28

## 2022-06-28 NOTE — PROGRESS NOTES
HISTORY OF PRESENT ILLNESS    Chief Complaint   Patient presents with    Fall     8 wks ago - broke elbow    Labs     Retake labs while off of supplements - fasting. Presents for follow-up    She was driven here by her . She says he has improved some with his dementia/ behavior and they are getting along more. He is caring for her some now. She is walking and swimming. She says she is focusing on self care more. She is happy that they can have conversations again, but admits he is irritable. 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 Left elbow fracture s/p surgery 4/18//22. Completed PT. Doing home PT. Has pain in elbow still which is limiting. Can not tolerate tylenol and is taking only asa 81 mg prn. No    Chronic fatigue. She was asked by Dr. Kun Fenton to stop all supplements and repeat labs in 3 months. Osteoporosis  Patient was diagnosed with osteoporosis DEXA scan on January 14 2019  T- 3.3 lumbar spine, T- 2.4 left radius, T- 2.8 of left femoral neck and T- 2.3 right femoral neck. Left hip total T- 2.1 and right hip total T- 1.5. She elected no therapy. History of borderline hypertension. On no medications. 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    has a past surgical history that includes hx cholecystectomy; hx colonoscopy (2016); hx thyroidectomy (2004); hx knee arthroscopy (Right, 04/11/2017); hx knee replacement; hx hip fracture tx (Left, 06/16/2019); and hx other surgical (04/2022). reports that she has never smoked. She has never used smokeless tobacco. She reports previous alcohol use of about 2.0 standard drinks of alcohol per week. She reports that she does not use drugs. family history includes Diabetes in her father; Heart Disease in her father; Stroke in her mother; Thyroid Cancer in her brother. Physical Exam   Nursing note and vitals reviewed. Blood pressure 136/82, pulse 72, temperature 98.3 °F (36.8 °C), temperature source Oral, resp. rate 15, height 5' 5\" (1.651 m), weight 181 lb 6.4 oz (82.3 kg), SpO2 98 %. Constitutional:  No distress. Eyes: Conjunctivae are normal.   Ears:  Hearing grossly intact  Cardiovascular: Normal rate. regular rhythm, no murmurs or gallops  No edema  Pulmonary/Chest: Effort normal.   CTAB  Musculoskeletal: moves all 4 extremities   Neurological: Alert and oriented to person, place, and time. Skin: No visible rash noted. Psychiatric: Normal mood and affect. Behavior is normal.     Assessment and Plan    Diagnoses and all orders for this visit:    1. Fatigue, unspecified type  She is overall doing much better. Has complained of ongoing fatigue which is relatively stable. We will check labs as below. Taking no supplements which I think is a good idea, unless any significant deficiencies are found. 2. Benign essential HTN  Blood pressure is reasonably controlled but borderline on no medication. Continue to monitor. She does have a fall risk  -     CBC WITH AUTOMATED DIFF; Future  -     METABOLIC PANEL, COMPREHENSIVE; Future  -     LIPID PANEL; Future    3. Age-related osteoporosis with current pathological fracture, initial encounter  Significant osteoporosis based on DEXA in 2019 and is currently on no medication therapy including no vitamin D or calcium supplementation.   I strongly recommend evaluation and treatment of this condition due to recurrent fractures. We will check vitamin D levels and then resume vitamin D, then start alendronate in 1 month. Side effects discussed and instructions given. -     DEXA BONE DENSITY STUDY AXIAL; Future  -     alendronate (FOSAMAX) 70 mg tablet; Take 1 Tablet by mouth every seven (7) days. Take on empty stomach with 8 oz water in the morning. Indications: decreased bone mass following menopause    4. Asymptomatic menopausal state  -     DEXA BONE DENSITY STUDY AXIAL; Future    5. Closed fracture of distal end of left humerus, unspecified fracture morphology, sequela  Ongoing significant neuropathic pain in left upper extremity. Following up with orthopedics. Consider gabapentin although side effects may be limiting. Trial of topical therapy with lidocaine would be helpful. 6. Vitamin D deficiency  History. Not currently taking a supplement. Very likely need to resume.  -     VITAMIN D, 25 HYDROXY; Future    7. Hypercalcemia  Mild hypercalcemia based on labs last time. Check PTH. May have been taking a supplement at that time.  -     PTH INTACT; Future    8. B12 deficiency  History? Not taking a supplement at this time. -     VITAMIN B12; Future    9. Iron deficiency  History of iron deficiency. Not taking iron at this time. -     FERRITIN; Future    10. Nontoxic uninodular goiter  States that she has a history of a goiter. No palpable goiter on exam.  TSH was normal 3 months ago. She requests complete thyroid panel.  -     T4, FREE; Future  -     T3, FREE; Future        lab results and schedule of future lab studies reviewed with patient  reviewed medications and side effects in detail    Return to clinic for further evaluation if new symptoms develop        Current Outpatient Medications   Medication Sig    aspirin delayed-release 81 mg tablet Take  by mouth every four (4) hours as needed for Pain.     alendronate (FOSAMAX) 70 mg tablet Take 1 Tablet by mouth every seven (7) days. Take on empty stomach with 8 oz water in the morning. Indications: decreased bone mass following menopause     No current facility-administered medications for this visit.

## 2022-07-02 DIAGNOSIS — R31.9 URINARY TRACT INFECTION WITH HEMATURIA, SITE UNSPECIFIED: Primary | ICD-10-CM

## 2022-07-02 DIAGNOSIS — I10 BENIGN ESSENTIAL HTN: ICD-10-CM

## 2022-07-02 DIAGNOSIS — N39.0 URINARY TRACT INFECTION WITH HEMATURIA, SITE UNSPECIFIED: Primary | ICD-10-CM

## 2022-10-19 ENCOUNTER — TELEPHONE (OUTPATIENT)
Dept: INTERNAL MEDICINE CLINIC | Age: 82
End: 2022-10-19

## 2022-10-19 NOTE — TELEPHONE ENCOUNTER
T/ C to patient to schedule an appointment for her physical for Pre-Op Ophthalmic surgery 11/16 and 12/5/22. No answer and LVM.

## 2022-10-24 ENCOUNTER — TELEPHONE (OUTPATIENT)
Dept: INTERNAL MEDICINE CLINIC | Age: 82
End: 2022-10-24

## 2022-10-24 NOTE — TELEPHONE ENCOUNTER
RTC to [facility] regarding patients pending surgery. Advised that patient has not made a pre op appt and last seen in June of this year. Inquired as to when is surgery. No answer and LVM.

## 2022-10-24 NOTE — TELEPHONE ENCOUNTER
[facility] calling needs pt's [phone] order this morning for her surgery.  Please fax it to [phone]

## 2022-10-24 NOTE — TELEPHONE ENCOUNTER
Spoke with Anum Meek at [facility]. And updated that a letter for Medical Clearance has been faxed to [phone] as pe rtheir request . Patient is there now for her surgery. Grateful for the call.

## 2022-10-24 NOTE — TELEPHONE ENCOUNTER
T/C to home and mobile #-no answer to the home and unable to LVM.  T/C to mibile -patient regarding her upcoming eye surgery and need for pre-op exam. No answer and LVM

## 2022-10-26 NOTE — PROGRESS NOTES
HISTORY OF PRESENT ILLNESS    Chief Complaint   Patient presents with    Transitions Of Care            Presents for follow-up of left hip fracture 6/15/19. Patient suffered a ground-level fall on her deck and was on the ground for about 2 hours until a family member arrived and called EMS. She complained of left hip pain and swelling since the fall. History of sciatica and she had been fairly active, swimming prior to this fall. Denies any syncope or loss of consciousness. Denies any head injury. On June 16, she tolerated left hip. Percutaneous pinning w Dr. Shea Braun.  Was discharged to SNF on June 18  She was ambulating the day after surgery. She was discharged from SNF [address] on 7/3/2019. Doing well  She reports she is doing will with no problems. She is being followed by [facility] Amrita Hebert is her nurse. 21 , she has SN, PT, and OT. NN spoke to [facility] 7/9  and reported patient is well and has no complaints    Patient was diagnosed with osteoporosis DEXA scan on January 14. T- 3.3 lumbar spine, T- 2.4 left radius, T- 2.8 of left femoral neck and T- 2.3 right femoral neck. Left hip total T- 2.1 and right hip total T- 1.5. Patient was prescribed 50,000 units of vitamin D for 8 weeks for severe vitamin D deficiency starting in November 2018. She is currently taking vitamin D and calcium. She was asked to make an appointment to schedule to discuss further treatments for osteoporosis, But did not. Hypertension, no TIA's, no chest pain on exertion, no   dyspnea on exertion, no swelling of ankles     reports that she has never smoked. She has never used smokeless tobacco.    reports that she drinks about 2.0 standard drinks of alcohol per week.    BP Readings from Last 2 Encounters:   07/26/19 148/88   06/18/19 125/86           Review of Systems   All other systems reviewed and are negative, except as noted in HPI    Past Medical and Surgical History   has a past medical Patient Instructions from Today's Visit    Reason for Visit:  Follow up visit    Diagnosis Information:  [email or web address]    Plan:  -This is a bladder cancer that has wide spread. This is a stage 4 bladder cancer.  -The goal of therapy is to try and stop the growth of cancer cells and tumors and extend quantity and quality of life  -You will have chemotherapy education prior to chemo start of therapy  -You will need a port placement. This will be at [facility] and someone will need to be with you    Follow Up:  -as followed    Recent Lab Results:  Hospital Outpatient Visit on 10/25/2022   Component Date Value Ref Range Status    POC Glucose 10/25/2022 116 (A)  65 - 100 mg/dL Final    Comment: 47 - 60 mg/dl Consistent with, but not fully diagnostic of hypoglycemia.   101 - 125 mg/dl Impaired fasting glucose/consistent with pre-diabetes mellitus  > 126 mg/dl Fasting glucose consistent with overt diabetes mellitus      Performed by: 10/25/2022 GilliamEmmaGraceNucMed/Pet   Final   Hospital Outpatient Visit on 10/25/2022   Component Date Value Ref Range Status    WBC 10/25/2022 13.4 (A)  4.3 - 11.1 K/uL Final    RBC 10/25/2022 3.48 (A)  4.23 - 5.6 M/uL Final    Hemoglobin 10/25/2022 10.4 (A)  13.6 - 17.2 g/dL Final    Hematocrit 10/25/2022 32.5  % Final    MCV 10/25/2022 93.4  82.0 - 102.0 FL Final    MCH 10/25/2022 29.9  26.1 - 32.9 PG Final    MCHC 10/25/2022 32.0  31.4 - 35.0 g/dL Final    RDW 10/25/2022 13.2  11.9 - 14.6 % Final    Platelets 32/40/4711 330  150 - 450 K/uL Final    MPV 10/25/2022 8.3 (A)  9.4 - 12.3 FL Final    nRBC 10/25/2022 0.00  0.0 - 0.2 K/uL Final    **Note: Absolute NRBC parameter is now reported with Hemogram**    Differential Type 10/25/2022 AUTOMATED    Final    Seg Neutrophils 10/25/2022 74  43 - 78 % Final    Lymphocytes 10/25/2022 13  13 - 44 % Final    Monocytes 10/25/2022 9  4.0 - 12.0 % Final    Eosinophils % 10/25/2022 3  0.5 - 7.8 % Final    Basophils 10/25/2022 0  0.0 - 2.0 % Final    Immature Granulocytes 10/25/2022 1  0.0 - 5.0 % Final    Segs Absolute 10/25/2022 9.9 (A)  1.7 - 8.2 K/UL Final    Absolute Lymph # 10/25/2022 1.7  0.5 - 4.6 K/UL Final    Absolute Mono # 10/25/2022 1.2  0.1 - 1.3 K/UL Final    Absolute Eos # 10/25/2022 0.5  0.0 - 0.8 K/UL Final    Basophils Absolute 10/25/2022 0.1  0.0 - 0.2 K/UL Final    Absolute Immature Granulocyte 10/25/2022 0.1  0.0 - 0.5 K/UL Final    Sodium 10/25/2022 135  133 - 143 mmol/L Final    Potassium 10/25/2022 4.5  3.5 - 5.1 mmol/L Final    Chloride 10/25/2022 103  101 - 110 mmol/L Final    CO2 10/25/2022 25  21 - 32 mmol/L Final    Anion Gap 10/25/2022 7  2 - 11 mmol/L Final    Glucose 10/25/2022 117 (A)  65 - 100 mg/dL Final    BUN 10/25/2022 40 (A)  8 - 23 MG/DL Final    Creatinine 10/25/2022 1.40  0.8 - 1.5 MG/DL Final    Est, Glom Filt Rate 10/25/2022 54 (A)  >60 ml/min/1.73m2 Final    Comment:      Pediatric calculator link: [email or web address]       Effective Oct 3, 2022       These results are not intended for use in patients <25years of age. eGFR results are calculated without a race factor using  the 2021 CKD-EPI equation. Careful clinical correlation is recommended, particularly when comparing to results calculated using previous equations. The CKD-EPI equation is less accurate in patients with extremes of muscle mass, extra-renal metabolism of creatinine, excessive creatine ingestion, or following therapy that affects renal tubular secretion.       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 has a past surgical history that includes hx cholecystectomy; hx colonoscopy (2016); hx thyroidectomy (2004); hx knee arthroscopy (Right, 04/11/2017); hx knee replacement; and hx hip fracture tx (Left, 06/16/2019). reports that she has never smoked. She has never used smokeless tobacco. She reports that she drinks about 2.0 standard drinks of alcohol per week. She reports that she does not use drugs. family history includes Diabetes in her father; Heart Disease in her father; Stroke in her mother; Thyroid Cancer in her brother. Physical Exam   Nursing note and vitals reviewed. Blood pressure 148/88, pulse 68, temperature 97.5 °F (36.4 °C), temperature source Oral, resp. rate 18, height 5' 5\" (1.651 m), weight 187 lb (84.8 kg), SpO2 96 %. Constitutional:  No distress. Eyes: Conjunctivae are normal.   Ears:  Hearing grossly intact  Cardiovascular: Normal rate. regular rhythm, no murmurs or gallops  No edema  Pulmonary/Chest: Effort normal.   CTAB  Musculoskeletal: moves all 4 extremities   Neurological: Alert and oriented to person, place, and time. Skin: No rash noted. Psychiatric: Normal mood and affect. Behavior is normal.    Ambulating well and slowly    ASSESSMENT and PLAN  Diagnoses and all orders for this visit:    1. Closed fracture of left hip with routine healing, subsequent encounter  Healing very well. Finishing up on physical therapy. Ambulating without great difficulty. 2. Benign essential HTN  Blood pressure is above goal, but just had recent surgery and she is very nervous about starting additional medications. Monitor for now.   Low-sodium diet and try to (A)  3.2 - 4.6 g/dL Final    Globulin 10/25/2022 4.1  2.8 - 4.5 g/dL Final    Albumin/Globulin Ratio 10/25/2022 0.8  0.4 - 1.6   Final        Treatment Summary has been discussed and given to patient: n/a        -------------------------------------------------------------------------------------------------------------------  Please call our office at [phone] if you have any  of the following symptoms:   Fever of 100.5 or greater  Chills  Shortness of breath  Swelling or pain in one leg    After office hours an answering service is available and will contact a provider for emergencies or if you are experiencing any of the above symptoms. Patient does express an interest in My Chart. My Chart log in information explained on the after visit summary printout at the [facility] [employer] desk. Carboplatin        Trade name: Paraplatin ®    Drug type:    Carboplatin is an anticancer drug (\"antineoplastic\" or \"cytotoxic\") chemotherapy drug. Carboplatin is classified as an \"alkylating agent. \"    What Carboplatin Is Used For:  Carboplatin is used to treat ovarian cancer. Carboplatin is also used for other types of cancer, including lung, head and neck, endometrial, esophageal, bladder, breast, and cervical; central nervous system or germ cell tumors; osteogenic sarcoma; and as preparation for a stem cell or bone marrow transplant. Note:  If a drug has been approved for one use, physicians may elect to use this same drug for other problems if they believe it may be helpful. How Carboplatin Is Given:  Carboplatin is usually given by infusion into a vein (intravenous, IV). Carboplatin can also be given intra-peritoneal, directly into the peritoneal cavity in the abdomen. The amount of Carboplatin you receive depends on many factors, including your height and weight, your general health or other health problems, and how your body responds to it. Your doctor will determine your dose and schedule.    Carboplatin Side increase exercise as tolerable. Remain off of any medications for this time. 3. Vitamin D insufficiency continue vitamin D and calcium supplementation. We did  High-dose vitamin D last fall. 4. Age-related osteoporosis with current pathological fracture, initial encounter  Significant osteoporosis with recent hip fracture from trauma. Strongly recommend therapy. Start alendronate once weekly. Continue vitamin D and calcium. I have. we already discussed this at a prior visit, so was not discussed during this visit. Will ask nurse to call her to inform her of the importance of starting this medication to reduce future fracture risk. Repeat bone density in 2 years. -     alendronate (FOSAMAX) 70 mg tablet; Take 1 Tab by mouth every seven (7) days. lab results and schedule of future lab studies reviewed with patient  reviewed medications and side effects in detail    Return to clinic for further evaluation if new symptoms develop        Current Outpatient Medications   Medication Sig    alendronate (FOSAMAX) 70 mg tablet Take 1 Tab by mouth every seven (7) days.  calcium-vitamin D (OYSTER SHELL) 500 mg(1,250mg) -200 unit per tablet Take 1 Tab by mouth three (3) times daily (with meals). No current facility-administered medications for this visit. Discussed the patient's BMI with her. The BMI follow up plan is as follows:     dietary management education, guidance, and counseling  encourage exercise  monitor weight  prescribed dietary intake    An After Visit Summary was printed and given to the patient. Effects:  Important things to remember about the side effects of Carboplatin:    Most people do not experience all of the side effects listed. Side effects are often predictable in terms of their onset and duration. Side effects are almost always reversible and will go away after treatment is complete. There are many options to help minimize or prevent side effects. There is no relationship between the presence or severity of side effects and the effectiveness of Carboplatin. The side effects of Carboplatin and their severity depend on how much of Carboplatin is given. In other words, high doses may produce more severe side effects). The following side effects are common (occurring in greater than 30%) for patients taking Carboplatin:    Low blood counts (including red blood cells, white blood cells and platelets)  Irwin: Meaning low point, irwin is the point in time between chemotherapy cycles in which you experience low blood counts. Onset: None reported  Irwin: 21 days  Recovery: 28 days    Nausea and vomiting usually occurring within 24 hours of treatment  Taste changes  Hair loss  Weakness  Blood test abnormalities: Abnormal magnesium level  These are less common (occurring in 10-29%) side effects for patients receiving Carboplatin:    Burning sensation at the injection site  Abdominal pain  Diarrhea  Constipation  Mouth sores  Infection  Peripheral neuropathy: Although uncommon, a serious side effect of decreased sensation and paresthesia (numbness and tingling of the extremities) may be noted. Sensory loss, numbness and tingling, and difficulty in walking may last for at least as long as therapy is continued. These side effects may become progressively more severe with continued treatment, and your doctor may decide to decrease your dose. Central neurotoxicity:  Infrequent but patients over age 72 are at increased risk.   Symptoms include dizziness, confusion, visual changes, ringing in the ears.  Nephrotoxicity (see kidney problems): More frequent when Carboplatin is given in high doses or to people with kidney problems. Hearing loss (ototoxicity) - loss of high pitched sounds. Abnormal blood electrolyte levels (sodium, potassium, calcium). Abnormal blood liver enzymes (SGOT, Alkaline phosphatase) (see liver problems). Cardiovascular events. Although infrequent, heart failure, blood clots and strokes have been reported with Carboplatin use. Less than 1% were life-threatening. Allergic reaction may occur. It would occur during the actual transfusion. This may include itching, rash, shortness of breath or dizziness (especially in patients who have received cisplatin). Not all side effects are listed above, some that are rare (occurring in less than 10% of patients) are not listed here. However, you should always inform your health care provider if you experience any unusual symptoms. When To Contact Your Doctor or Health Care Provider:  Contact your health care provider immediately, day or night, if you should experience any of the following symptoms:    Fever of 100.4(F (38(C) or higher, or chills (possible signs of infection). Difficulty breathing or shortness of breath. Chest pain. The following symptoms require medical attention, but are not an emergency. Contact your health care provider within 24 hours of noticing any of the following:    Unusual bleeding or bruising  Black or tarry stools, or blood in your stools or urine  Diarrhea (4-6 episodes in a 24-hour period)  Nausea (interferes with ability to eat and unrelieved with prescribed medications).   Vomiting (vomiting more than 4-5 times in a 24-hour period)  Severe abdominal pain  Lip or mouth sores (painful redness, swelling or ulcers)  Extreme fatigue (unable to carry on self-care activities)  Muscle cramps or twitching  Change in hearing  Dizziness, confusion or visual changes  Always inform your health care provider if you experience any unusual symptoms. Carboplatin Precautions:  Before starting Carboplatin treatment, make sure you tell your doctor about any other medications you are taking (including prescription, over-the-counter, vitamins, herbal remedies, etc.   Do not take aspirin, products containing aspirin unless your doctor specifically permits this. Carboplatin may be inadvisable if you have a history of severe allergic reaction to cisplatin, Carboplatin, other platinum-containing formulations or mannitol. Do not receive any kind of immunization or vaccination without your doctor's approval while taking Carboplatin. Decreased sensation, numbness and tingling in fingers and toes may become progressively worse with repeated doses of Carboplatin. It is important to report this to your doctor. Inform your health care professional if you are pregnant or may be pregnant prior to starting this treatment. Pregnancy category D (Carboplatin may be hazardous to the fetus. Women who are pregnant or become pregnant must be advised of the potential hazard to the fetus). For both men and women: Do not conceive a child (get pregnant) while taking Carboplatin. Barrier methods of contraception, such as condoms, are recommended. Discuss with your doctor when you may safely become pregnant or conceive a child after therapy. Do not breast feed while taking Carboplatin. Carboplatin Self-Care Tips:  Drink at least two to three quarts of fluid every 24 hours, unless you are instructed otherwise. You may be at risk of infection so try to avoid crowds or people with colds, and report fever or any other signs of infection immediately to your health care provider. Wash your hands often. To help treat/prevent mouth sores, use a soft toothbrush, and rinse three times a day with 1/2 to 1 teaspoon of baking soda and/or 1/2 to 1 teaspoon of salt mixed with 8 ounces of water.   Use an electric razor and a soft toothbrush to minimize bleeding. Avoid contact sports or activities that could cause injury. To reduce nausea, take anti-nausea medications as prescribed by your doctor, and eat small, frequent meals. Avoid sun exposure. Wear SPF 13 (or higher) sunblock and protective clothing. In general, drinking alcoholic beverages should be kept to a minimum or avoided completely. You should discuss this with your doctor. Get plenty of rest.   Maintain good nutrition. If you experience symptoms or side effects, be sure to discuss them with your health care team.  They can prescribe medications and/or offer other suggestions that are effective in managing such problems. Monitoring and Testing While Taking Carboplatin:  You will be monitored regularly by your doctor while you are taking Carboplatin. Tests will include complete blood counts, electrolytes, kidney function tests and liver enzymes. Because drug toxicity is seen as numbness and tingling of fingers and toes, a periodic physical examination, which includes a check of your reflexes, is necessary to detect the need for decreased dosages. How Carboplatin Works:  Cancerous tumors are characterized by cell division, which is no longer controlled as it is in normal tissue. \"Normal\" cells stop dividing when they come into contact with like cells, a mechanism known as contact inhibition. Cancerous cells lose this ability. Cancer cells no longer have the normal checks and balances in place that control and limit cell division. The process of cell division, whether normal or cancerous cells, is through the cell cycle. The cell cycle goes from the resting phase, through active growing phases, and then to mitosis (division). The ability of chemotherapy to kill cancer cells depends on its ability to halt cell division. Usually, the drugs work by damaging the RNA or DNA that tells the cell how to copy itself in division. If the cells are unable to divide, they die.   The faster the cells are dividing, the more likely it is that chemotherapy will kill the cells, causing the tumor to shrink. They also induce cell suicide (self-death or apoptosis). Chemotherapy drugs that affect cells only when they are dividing are called cell-cycle specific. Chemotherapy drugs that affect cells when they are at rest are called cell-cycle non-specific. The scheduling of chemotherapy is set based on the type of cells, rate at which they divide, and the time at which a given drug is likely to be effective. This is why chemotherapy is typically given in cycles. Chemotherapy is most effective at killing cells that are rapidly dividing. Unfortunately, chemotherapy does not know the difference between the cancerous cells and the normal cells. The \"normal\" cells will grow back and be healthy but in the meantime, side effects occur. The \"normal\" cells most commonly affected by chemotherapy are the blood cells, the cells in the mouth, stomach and bowel, and the hair follicles; resulting in low blood counts, mouth sores, nausea, diarrhea, and/or hair loss. Different drugs may affect different parts of the body. Chemotherapy (anti-neoplastic drugs) is divided into five classes based on how they work to kill cancer. Although these drugs are divided into groups, there is some overlap among some of the specific drugs. The following are the types of chemotherapy:    Alkylating Agents  Alkylating agents are most active in the resting phase of the cell. These drugs are cell-cycle non-specific. There are several types of alkylating agents. Mustard gas derivatives:  Mechlorethamine, Cyclophosphamide, Chlorambucil, Melphalan, and Ifosfamide. Ethylenimines: Thiotepa and Hexamethylmelamine. Alkylsulfonates:  Busulfan. Hydrazines and Triazines:  Procarbazine, Dacarbazine and Temozolomide. Nitrosureas:  Carmustine, Lomustine and Streptozocin.   Nitrosureas are unique because, unlike most chemotherapy, they can cross the blood-brain barrier. They can be useful in treating brain tumors. Metal salts:  Carboplatin, Cisplatin, and Oxaliplatin. Note:  We strongly encourage you to talk with your health care professional about your specific medical condition and treatments. The information contained in this website is meant to be helpful and educational, but is not a substitute for medical advice. Gemzar        Generic Name: Gemcitabine    Drug Type:    Gemzar is an anti-cancer (\"antineoplastic\" or \"cytotoxic\") chemotherapy drug. Gemzar is classified as an antimetabolite. For more detail, see \"How Gemzar Works\" below. What Gemzar Is Used For:  Pancreas cancer  Non-small cell lung cancer  Bladder cancer  Soft-tissue sarcoma  Metastatic breast cancer  Ovarian cancer  Note:  If a drug has been approved for one use, physicians sometimes elect to use this same drug for other problems if they believe it might be helpful. How Gemzar Is Given:  Gemzar is given by infusion through a vein (intravenously, by IV). There is no pill form of Gemzar. The amount of Gemzar you will receive depends on many factors, including your height and weight, your general health or other health problems, and the type of cancer you have. Your doctor will determine your exact dosage and schedule. Side Effects:  Important things to remember about Gemzar side effects:    Most people do not experience all of the Gemzar side effects listed. Gemzar side effects are often predictable in terms of their onset, duration and severity. Gemzar side effects are almost always reversible and will go away after treatment is complete. There are many options to help minimize or prevent Gemzar side effects. There is no relationship between the presence or severity of Gemzar side effects and the effectiveness of Gemzar.   The following Gemzar side effects are common (occurring in more than 30%) for patients taking Gemzar:    Flu-like symptoms (muscle pain, fever, headache, chills, fatigue)  Fever  (within 6-12 hours of first dose)  Fatigue  Nausea (mild)  Vomiting  Poor appetite  Skin rash  Low blood counts. Your white and red blood cells and platelets may temporarily decrease. This can put you at increased risk for infection, anemia and/or bleeding. Irwin: Meaning low point, irwin is the point in time between chemotherapy cycles in which you experience low blood counts. Onset: none noted  Irwin: 10-14 days  Recovery: day 21    Temporary increases in liver enzymes  Blood or protein in the urine  These are less common Gemzar side effects (occurring in 10-29%) for patients receiving Gemzar:    Diarrhea  Weakness  Hair loss  Mouth sores  Difficulty sleeping  Shortness of breath (see lung problems)  Not all Gemzar side effects are listed above, some that are rare (occurring in less than 10% of patients) are not listed here. However, you should always inform your health care provider if you experience any unusual symptoms. When to contact your doctor or health care provider:  Contact your health care provider immediately, day or night, if you should experience any of the following symptoms:    Fever of 100.4°F (38°C) or higher, chills (possible signs of infection)  The following symptoms require medical attention, but are not emergency situations. Contact your health care provider within 24 hours of noticing any of the following:    Nausea that interferes with eating and is not relieved by medications prescribed by your doctor. Vomiting (more than 4-5 episodes within a 24-hour period)  Extreme fatigue (inability to perform self-care activities)  Diarrhea (more than 4-6 episodes in a 24-hour period)  Unusual bleeding or bruising  Black or tarry stools, or blood in your stools or urine  Always inform your health care provider if you experience any unusual symptoms.     Precautions:  Before starting Gemzar treatment, make sure you tell your doctor about any other medications you are taking (including prescription, over-the-counter, vitamins, herbal remedies, etc.). Do not take aspirin or products containing aspirin unless your doctor specifically permits this. Do not receive any kind of vaccination without your doctor's approval while taking Gemzar. Inform your health care professional if you are pregnant or may be pregnant prior to starting this treatment. Pregnancy category D (Gemzar may be hazardous to the fetus. Women who are pregnant or become pregnant must be advised of the potential hazard to the fetus). For both men and women: Do not conceive a child (get pregnant) while taking Gemzar. Barrier methods of contraception, such as condoms, are recommended. Discuss with your doctor when you may safely become pregnant or conceive a child after therapy. Do not breast feed while taking Gemzar. Self-Care Tips:  For flu-like symptoms, keep warm with blankets and drink plenty of liquids. Drink at least two to three quarts of fluid every 24 hours, unless you are instructed otherwise. You may be at risk of infection so try to avoid crowds or people with colds, and report fever or any other signs of infection immediately to your health care provider. Wash your hands often. To help treat/prevent mouth sores, use a soft toothbrush, and rinse three times a day with 1/2 to 1 teaspoon of baking soda and/or salt mixed with 8 ounces of water. Use an electric razor and a soft toothbrush to minimize bleeding. Avoid contact sports or activities that could cause injury. To reduce nausea, take anti-nausea medications as prescribed by your doctor, and eat small, frequent meals. Avoid sun exposure. Wear SPF 13 (or higher) sunblock and protective clothing. In general, drinking alcoholic beverages should be kept to a minimum or avoided completely. You should discuss this with your doctor.   You may experience drowsiness or dizziness; avoid driving or engaging in tasks that require alertness until your response to Gemzar is known. Get plenty of rest.   Maintain good nutrition. If you experience symptoms or side effects, be sure to discuss them with your health care team.  They can prescribe medications and/or offer other suggestions that are effective in managing such problems. Monitoring and Testing: You will be checked regularly by your doctor while you are taking Gemzar, to monitor side effects and check your response to therapy. Periodic blood work will be obtained to monitor your complete blood count (CBC) as well as the function of other organs (such as your kidneys and liver) will also be ordered by your doctor. How Gemzar Works:  Cancerous tumors are characterized by cell division, which is no longer controlled as it is in normal tissue. \"Normal\" cells stop dividing when they come into contact with like cells, a mechanism known as contact inhibition. Cancerous cells lose this ability. Cancer cells no longer have the normal checks and balances in place that control and limit cell division. The process of cell division, whether normal or cancerous cells, is through the cell cycle. The cell cycle goes from the resting phase, through active growing phases, and then to mitosis (division). The ability of chemotherapy to kill cancer cells depends on its ability to halt cell division. Usually, the drugs work by damaging the RNA or DNA that tells the cell how to copy itself in division. If the cells are unable to divide, they die. The faster the cells are dividing, the more likely it is that chemotherapy will kill the cells, causing the tumor to shrink. They also induce cell suicide (self-death or apoptosis). Chemotherapy drugs that affect cells only when they are dividing are called cell-cycle specific. Chemotherapy drugs that affect cells when they are at rest are called cell-cycle non-specific.   The scheduling of chemotherapy is set based on the type of cells, rate at which they divide, and the time at which a given drug is likely to be effective. This is why chemotherapy is typically given in cycles. Unfortunately, chemotherapy does not know the difference between the cancerous cells and the normal cells. Chemotherapy will kill all cells that are rapidly dividing. The \"normal\" cells will grow back and be healthy but in the meantime, side effects occur. The \"normal\" cells most commonly affected by chemotherapy are the blood cells, the cells in the mouth, stomach and bowel, and the hair follicles; resulting in low blood counts, mouth sores, nausea, diarrhea, and/or hair loss. Different drugs may affect different parts of the body. Gemzar belongs to the family of drugs called antimetabolites. Antimetabolites are very similar to normal substances within the cell. When the cells incorporate these substances into the cellular metabolism, they are unable to divide. Antimetabolites are cell-cycle specific. They attack cells at very specific phases in the cycle. Antimetabolites are classified according to the substances with which they interfere: Folic acid antagonist:  Methotrexate  Pyrimidine antagonist:  5-Fluorouracil, Foxuridine, Cytarabine, Capecitabine, and Gemzar  Purine antagonist:  6-Mercaptopurine and 6-Thioguanine  Adenosine deaminase inhibitor:  Cladribine, Fludarabine and Pentostatin  Note: We strongly encourage you to talk with your health care professional about your specific medical condition and treatments. The information contained in this website is meant to be helpful and educational, but is not a substitute for medical advice.     KVNG Ha RN, BSN  Nurse Navigator  [phone] [email or web address]

## 2022-11-10 ENCOUNTER — OFFICE VISIT (OUTPATIENT)
Dept: INTERNAL MEDICINE CLINIC | Age: 82
End: 2022-11-10
Payer: MEDICARE

## 2022-11-10 VITALS
DIASTOLIC BLOOD PRESSURE: 88 MMHG | HEART RATE: 79 BPM | SYSTOLIC BLOOD PRESSURE: 139 MMHG | BODY MASS INDEX: 30.39 KG/M2 | TEMPERATURE: 98.5 F | HEIGHT: 65 IN | RESPIRATION RATE: 15 BRPM | WEIGHT: 182.4 LBS | OXYGEN SATURATION: 97 %

## 2022-11-10 DIAGNOSIS — E21.3 HYPERPARATHYROIDISM (HCC): ICD-10-CM
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
## 2022-11-10 DIAGNOSIS — H25.019 CORTICAL AGE-RELATED CATARACT, UNSPECIFIED LATERALITY: Primary | ICD-10-CM

## 2022-11-10 DIAGNOSIS — M26.609 TMJ (TEMPOROMANDIBULAR JOINT SYNDROME): ICD-10-CM

## 2022-11-10 DIAGNOSIS — M79.602 LEFT ARM PAIN: ICD-10-CM

## 2022-11-10 DIAGNOSIS — I10 BENIGN ESSENTIAL HTN: ICD-10-CM

## 2022-11-10 DIAGNOSIS — Z87.81 HISTORY OF HUMERUS FRACTURE: ICD-10-CM

## 2022-11-10 DIAGNOSIS — F41.8 SITUATIONAL ANXIETY: ICD-10-CM

## 2022-11-10 DIAGNOSIS — T74.91XA VICTIM OF SPOUSAL OR PARTNER ABUSE, INITIAL ENCOUNTER: ICD-10-CM

## 2022-11-10 DIAGNOSIS — Z23 NEEDS FLU SHOT: ICD-10-CM

## 2022-11-10 PROCEDURE — G8510 SCR DEP NEG, NO PLAN REQD: HCPCS | Performed by: INTERNAL MEDICINE

## 2022-11-10 PROCEDURE — G8427 DOCREV CUR MEDS BY ELIG CLIN: HCPCS | Performed by: INTERNAL MEDICINE

## 2022-11-10 PROCEDURE — G8752 SYS BP LESS 140: HCPCS | Performed by: INTERNAL MEDICINE

## 2022-11-10 PROCEDURE — 1090F PRES/ABSN URINE INCON ASSESS: CPT | Performed by: INTERNAL MEDICINE

## 2022-11-10 PROCEDURE — G8417 CALC BMI ABV UP PARAM F/U: HCPCS | Performed by: INTERNAL MEDICINE

## 2022-11-10 PROCEDURE — 99214 OFFICE O/P EST MOD 30 MIN: CPT | Performed by: INTERNAL MEDICINE

## 2022-11-10 PROCEDURE — 90694 VACC AIIV4 NO PRSRV 0.5ML IM: CPT | Performed by: INTERNAL MEDICINE

## 2022-11-10 PROCEDURE — 1101F PT FALLS ASSESS-DOCD LE1/YR: CPT | Performed by: INTERNAL MEDICINE

## 2022-11-10 PROCEDURE — G0463 HOSPITAL OUTPT CLINIC VISIT: HCPCS | Performed by: INTERNAL MEDICINE

## 2022-11-10 PROCEDURE — G8536 NO DOC ELDER MAL SCRN: HCPCS | Performed by: INTERNAL MEDICINE

## 2022-11-10 PROCEDURE — G8754 DIAS BP LESS 90: HCPCS | Performed by: INTERNAL MEDICINE

## 2022-11-10 RX ORDER — MELATONIN
1000 DAILY
Qty: 30 TABLET | Refills: 5
Start: 2022-11-10

## 2022-11-10 NOTE — PATIENT INSTRUCTIONS
Vaccine Information Statement    Influenza (Flu) Vaccine (Inactivated or Recombinant): What You Need to Know    Many vaccine information statements are available in [place] and other languages. See www.immunize.org/vis. Hojas de información sobre vacunas están disponibles en español y en muchos otros idiomas. Visite www.immunize.org/vis. 1. Why get vaccinated? Influenza vaccine can prevent influenza (flu). Flu is a contagious disease that spreads around the [facility] every year, usually between October and May. Anyone can get the flu, but it is more dangerous for some people. Infants and young children, people 72 years and older, pregnant people, and people with certain health conditions or a weakened immune system are at greatest risk of flu complications. Pneumonia, bronchitis, sinus infections, and ear infections are examples of flu-related complications. If you have a medical condition, such as heart disease, cancer, or diabetes, flu can make it worse. Flu can cause fever and chills, sore throat, muscle aches, fatigue, cough, headache, and runny or stuffy nose. Some people may have vomiting and diarrhea, though this is more common in children than adults. In an average year, thousands of people in the [facility] die from flu, and many more are hospitalized. Flu vaccine prevents millions of illnesses and flu-related visits to the doctor each year. 2. Influenza vaccines     CDC recommends everyone 6 months and older get vaccinated every flu season. Children 6 months through 6years of age may need 2 doses during a single flu season. Everyone else needs only 1 dose each flu season. It takes about 2 weeks for protection to develop after vaccination. There are many flu viruses, and they are always changing. Each year a new flu vaccine is made to protect against the influenza viruses believed to be likely to cause disease in the upcoming flu season.  Even when the vaccine doesnt exactly match these viruses, it may still provide some protection. Influenza vaccine does not cause flu. Influenza vaccine may be given at the same time as other vaccines. 3. Talk with your health care provider    Tell your vaccination provider if the person getting the vaccine:  Has had an allergic reaction after a previous dose of influenza vaccine, or has any severe, life-threatening allergies   Has ever had Guillain-Barré Syndrome (also called GBS)    In some cases, your health care provider may decide to postpone influenza vaccination until a future visit. Influenza vaccine can be administered at any time during pregnancy. People who are or will be pregnant during influenza season should receive inactivated influenza vaccine. People with minor illnesses, such as a cold, may be vaccinated. People who are moderately or severely ill should usually wait until they recover before getting influenza vaccine. Your health care provider can give you more information. 4. Risks of a vaccine reaction    Soreness, redness, and swelling where the shot is given, fever, muscle aches, and headache can happen after influenza vaccination. There may be a very small increased risk of Guillain-Barré Syndrome (GBS) after inactivated influenza vaccine (the flu shot). [employer] children who get the flu shot along with pneumococcal vaccine (PCV13) and/or DTaP vaccine at the same time might be slightly more likely to have a seizure caused by fever. Tell your health care provider if a child who is getting flu vaccine has ever had a seizure. People sometimes faint after medical procedures, including vaccination. Tell your provider if you feel dizzy or have vision changes or ringing in the ears. As with any medicine, there is a very remote chance of a vaccine causing a severe allergic reaction, other serious injury, or death. 5. What if there is a serious problem?     An allergic reaction could occur after the vaccinated person leaves the clinic. If you see signs of a severe allergic reaction (hives, swelling of the face and throat, difficulty breathing, a fast heartbeat, dizziness, or weakness), call 9-1-1 and get the person to the nearest hospital.    For other signs that concern you, call your health care provider. Adverse reactions should be reported to the Vaccine Adverse Event Reporting System (VAERS). Your health care provider will usually file this report, or you can do it yourself. Visit the VAERS website at www.vaers. [facility].gov or call 5-640.630.2569. VAERS is only for reporting reactions, and VAERS staff members do not give medical advice. 6. The National Vaccine Injury Compensation Program    The [facility] Vaccine Injury Compensation Program (VICP) is a federal program that was created to compensate people who may have been injured by certain vaccines. Claims regarding alleged injury or death due to vaccination have a time limit for filing, which may be as short as two years. Visit the VICP website at www.[facility].gov/vaccinecompensation or call 9-213.357.2780 to learn about the program and about filing a claim. 7. How can I learn more? Ask your health care provider. Call your local or state health department. Visit the website of the Food and Drug Administration (FDA) for vaccine package inserts and additional information at www.fda.gov/vaccines-blood-biologics/vaccines. Contact the Centers for Disease Control and Prevention (CDC): Call 6-690.633.2691 (1-800-CDC-INFO) or  Visit CDCs influenza website at www.cdc.gov/flu. Vaccine Information Statement   Inactivated Influenza Vaccine   8/6/2021  42 MAXIMUS Martinez [id number]   Department of Health and Human Services  Centers for Disease Control and Prevention    Office Use Only
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
## 2022-11-10 NOTE — PROGRESS NOTES
Patient present for routine immunizations. Pt denies any symptoms , reactions or allergies that would exclude them from being immunized today. Risks and adverse reactions were discussed and the VIS was given to them. All questions were addressed. Pt was observed for 10 min post injection. There were no reactions observed.   Emma England LPN

## 2022-11-11 ENCOUNTER — PATIENT OUTREACH (OUTPATIENT)
Dept: CASE MANAGEMENT | Age: 82
End: 2022-11-11
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
## 2022-11-11 NOTE — ACP (ADVANCE CARE PLANNING)
Advance Care Planning (ACP)     Patient does not have ACP documents on file. Discussed ACP education and offered referral for ACP services, however the patient declines referral at this time.

## 2022-11-14 ENCOUNTER — HOSPITAL ENCOUNTER (OUTPATIENT)
Dept: PHYSICAL THERAPY | Age: 82
Discharge: HOME | End: 2022-11-14
Payer: MEDICARE

## 2022-11-14 ENCOUNTER — PATIENT OUTREACH (OUTPATIENT)
Dept: CASE MANAGEMENT | Age: 82
End: 2022-11-14

## 2022-11-14 PROCEDURE — 97162 PT EVAL MOD COMPLEX 30 MIN: CPT

## 2022-11-14 PROCEDURE — 97110 THERAPEUTIC EXERCISES: CPT

## 2022-11-14 NOTE — ACP (ADVANCE CARE PLANNING)
Advance Care Planning     General Advance Care Planning (ACP) Conversation    Date of Conversation: 11/14/2022  Conducted with: Patient with Decision Making Capacity    Healthcare Decision Maker:     Primary Decision Maker: Radha Keene - [facility] - [phone]  Click here to complete [address] including selection of the Healthcare Decision Maker Relationship (ie \"Primary\")    Today we discussed [address]. The patient is considering options.      Content/Action Overview:   DECLINED ACP conversation - will revisit periodically     Length of Voluntary ACP Conversation in minutes:  <16 minutes (Non-Billable)    Radha Altman, LCSW

## 2022-11-14 NOTE — PROGRESS NOTES
Initial Contact Social Work Note - Ambulatory  11/14/2022    Date of referral: 11/14/22  Referral received from: Mariann Gillespie RN/ACM   Reason for referral: Resources for Caregiver Support     Previous SW Referral: No    If yes, brief summary and outcome:  N/A    Two Identifiers Verified: Yes    Insurance Provider: Medicare A&B     Support System:  Patient has one son in [place], [place] (PARDEEP), and one son in [place] [facility] (Nehal Funez). Her  also has a daughter from his first marriage, who lives in [place], but he has limited to no contact with her. [place] Status: N/A    Community Providers: Outpatient PT with [facility]. Patient has an appointment today, 11/14/22 at 2:30PM.  She reports no barriers to attending this appointment. ADL Assistance: N/A Patient is independent with all ADL's, but struggles with nerve pain from a left humeral fracture in June 2022. She explained that she plans to restart outpatient physical therapy to work on these challenges. Housing/Living Concerns or Home Modification Needs: Patient lives in a two story home with her . She has used DME in the past, following a partial hip replacement, but reports that she does not currently use any DME. Transportation Concern: Patient and her  both drive. However, the patient reported concerns regarding her 's driving abilities, related to his memory/cognitive deficits. Medication Cost Concern: No concerns reported at this time. Medication Education or Adherence Concern: No concerns reported at this time. Financial Concern(s): Patient is on a fixed income through [employer]. Patient reports that her  suffers from dementia, and can be angry and controlling, as a result of his diagnosis. She shared that he continues to attempt to manage their finances, but has missed some of the bills recently.   She has access to their checking account, and has her name on the bills as well. Brainstormed methods to problem solve this situation, and advised that she follow up by reviewing suggestions provided by the Alzheimer's Association. Income (only if applicable): Patient is on a fixed income through [employer] ($751/month). 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 Ability to Read/Write: Yes    Advance Care Plan:  Pt declined Patient declined at this time. Patient is agreeable to sending a referral to [facility] in two weeks, to complete Medical Power of  and Financial Power of  paperwork. Other: Patient reports that her  can be verbally abusive, related to his memory challenges/cognitive deficits. Patient states that she does not feel that she is in any type of imminent danger, and her safety is not at risk. The patient reports that her children, along with other family members, will be visiting from out of town starting on Wednesday, 11/16/22. She would prefer that this  follow up in two weeks to address some of her concerns. Identified Needs:     Caregiver Resources   Outpatient PT to manage physical challenges     Social Work Plan:    Ongoing psychosocial support as desired by the patient. Method of Communication with Provider (if appropriate): chart routing        Goals Addressed                      This Visit's Progress      Connect with [employer] (pt-stated)   On track      11/14/22  Connected with the patient via phone today. Introduced self and role, and offered support. The patient identified that her primary concerns are related to her 's memory impairment. Specifically, she is concerned about her safety, and that of her 's, as it relates to his driving, as well as her 's management and control of their financial affairs.   Patient states that she does not feel that she is in any imminent danger, or safety risk. Provided the 24/7 Helpline through the Alzheimer's Association, P#1-634.741.8688, and directed her to the website [employer]. Also sent this information through [address], and included the link to [employer]Se[employer]. org/> that allows caregivers to create an action plan, and links information to support and local resources. Plan:  Ongoing psychosocial support and resource referral, as desired by the patient. This  will follow up with the patient in two weeks, to check on her status, and progress with the resources provided today.      EPC               MITRA Foster/CHARLY, [facility]   Y#[phone]
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
## 2022-11-15 NOTE — THERAPY EVALUATION
Physical Therapy at [facility],   a part of  [facility]  [address]  Phone: [phone]  Fax: [phone]    Plan of Care/Statement of Necessity for Physical Therapy Services  -15    Patient name: Daphne Gonzales  : 1940  Provider#: [id number]  Referral source: Garrick Patel MD      Medical/Treatment Diagnosis: Pain in left arm [M79.602]     Prior Hospitalization: see medical history     Comorbidities: anxiety, IBS, UTI, HTN, breast CA, TMJ, L femoral shaft fracture, PPD+, osteoporosis, nephrolithiasis, chest pain, hyperparathyroidism,   Prior Level of Function: Able to swim for exercise, swimming laps   Medications: Verified on Patient Summary List  Start of Care: 22      Onset Date: 22   The Plan of Care and following information is based on the information from the initial evaluation. Assessment/ key information: Patient is an 81year old female presenting with L elbow pain and extension AROM deficits s/p humeral fracture and surgery . Pain is exacerbated by multiple psychosocial factors and affects her entire LUE- will administer FABQ next session. She will benefit from pain neuroscience education, graded exposure, and progressive stretching/strengthening interventions to regain elbow extension ROM, strength for lifting, and confidence in utilizing her LUE. Patient will benefit from skilled PT to address all previously listed deficits. Evaluation Complexity History HIGH Complexity :3+ comorbidities / personal factors will impact the outcome/ POC ; Examination LOW Complexity : 1-2 Standardized tests and measures addressing body structure, function, activity limitation and / or participation in recreation  ;Presentation HIGH Complexity : Unstable and unpredictable characteristics  ; Clinical Decision Making MEDIUM Complexity : FOTO score of 26-74  Overall Complexity Rating: MEDIUM    Problem List: pain affecting function, decrease ROM, decrease strength, impaired gait/ balance, decrease ADL/ functional abilitiies, decrease activity tolerance, and decrease flexibility/ joint mobility   Treatment Plan may include any combination of the following: Therapeutic exercise, Neuromuscular reeducation, Manual therapy, Therapeutic activity, Self care/home management, Electric stim unattended , Vasopneumatic device, Gait training, Ultrasound, Electric stim attended, and Needle insertion w/o injection (1 or 2 muscles)  Patient / Family readiness to learn indicated by: asking questions, trying to perform skills, and interest  Persons(s) to be included in education: patient (P)  Barriers to Learning/Limitations: None  Patient Goal (s): swimming   Patient Self Reported Health Status: fair  Rehabilitation Potential: fair    Short Term Goals: To be accomplished in 4 weeks:  Patient will be independent with initial HEP in order to transition to general wellness program.  Patient will demonstrate L elbow extension to -30 degrees or better to ease dressing activities. Patient will demonstrate L shoulder flexion to 155 degrees or better to ease overhead reaching into high cabinets. Long Term Goals: To be accomplished in 12 weeks:  Patient will demonstrate elbow extension to -5 degrees or better to ease overhead reaching into high cabinets. Patient will be able to lift 10# without increase in pain to ease carrying and unloading groceries. Patient will demonstrate L shoulder flexion to 180 to ease showering and overhead reaching. Frequency / Duration: Patient to be seen 2 times per week for up to 12 weeks.     Patient/ Caregiver education and instruction: self care, activity modification, and exercises    [x]  Plan of care has been reviewed with PTA        Certification Period: 11/14/22-2/14/23  Rex Palafox DPT 11/15/2022     ________________________________________________________________________    I certify that the above Therapy Services are being furnished while the patient is under my care. I agree with the treatment plan and certify that this therapy is necessary.     [de-identified] Signature:____________________  Date:____________Time: _________         Bridgett Del Castillo MD

## 2022-11-28 ENCOUNTER — HOSPITAL ENCOUNTER (OUTPATIENT)
Dept: PHYSICAL THERAPY | Age: 82
Discharge: HOME | End: 2022-11-28
Payer: MEDICARE

## 2022-11-28 PROCEDURE — 97110 THERAPEUTIC EXERCISES: CPT | Performed by: PHYSICAL MEDICINE & REHABILITATION

## 2022-11-28 NOTE — PROGRESS NOTES
PT DAILY TREATMENT NOTE - [facility] 2-15    Patient Name: Pauly Barker  Date:2022  : 1940  [x]  Patient  Verified  Payor: Sarath Tillman / Plan: [place] MEDICARE PART A & B / Product Type: Medicare /    In time:1050a  Out time:1140a  Total Treatment Time (min): 50  Total Timed Codes (min): 40  1:1 Treatment Time ( only): 40   Visit #: 2      Treatment Area: Pain in left arm [M79.602]    SUBJECTIVE  Pain Level (0-10 scale): 3/10  Any medication changes, allergies to medications, adverse drug reactions, diagnosis change, or new procedure performed?: [x] No    [] Yes (see summary sheet for update)  Subjective functional status/changes:   [] No changes reported  Patient reported she is doing okay today. Still having trouble with ADLs such as sweeping and vacuuming. OBJECTIVE    Modality rationale: decrease inflammation, decrease pain, and increase tissue extensibility to improve the patients ability to ADLs, standing and walking tolerance.     Min Type Additional Details    [] Estim: []Att   []Unatt        []TENS instruct                  []IFC  []Premod   []NMES                     []Other:  []w/US   []w/ice   []w/heat  Position:  Location:    []  Traction: [] Cervical       []Lumbar                       [] Prone          []Supine                       []Intermittent   []Continuous Lbs:  [] before manual  [] after manual  []w/heat    []  Ultrasound: []Continuous   [] Pulsed at:                           []1MHz   []3MHz Location:  W/cm2:    [] Paraffin         Location:   []w/heat   10 []  Ice     [x]  Heat  []  Ice massage Position: seated  Location: L elbow    []  Laser  []  Other: Position:  Location:      []  Vasopneumatic Device Pressure:       [] lo [] med [] hi   Temperature:      [x] Skin assessment post-treatment:  [x]intact []redness- no adverse reaction    []redness - adverse reaction:     40 min Therapeutic Exercise:  [x] See flow sheet :   Rationale: increase ROM, increase strength, and improve coordination to improve the patients ability to ADLs, lift and carry          With   [x] TE   [] TA   [] neuro   [] other: Patient Education: [x] Review HEP    [] Progressed/Changed HEP based on:   [] positioning   [] body mechanics   [] transfers   [] heat/ice application    [] other:      Other Objective/Functional Measures:   Great tolerance to today's exercises    Full elbow flexion noted today     Pain Level (0-10 scale) post treatment: \"better\"    ASSESSMENT/Changes in Function:     Patient will continue to benefit from skilled PT services to modify and progress therapeutic interventions, address functional mobility deficits, address ROM deficits, address strength deficits, analyze and address soft tissue restrictions, analyze and cue movement patterns, analyze and modify body mechanics/ergonomics, and assess and modify postural abnormalities to attain remaining goals. []  See Plan of Care  []  See progress note/recertification  []  See Discharge Summary         Progress towards goals / Updated goals:  Patient tolerated today's session very well with no increase in pain present.     PLAN  [x]  Upgrade activities as tolerated     [x]  Continue plan of care  [x]  Update interventions per flow sheet       []  Discharge due to:_  []  Other:_      Jacky Koyanagi PTA, OPT[facility], CPT  11/28/2022

## 2022-11-29 ENCOUNTER — PATIENT OUTREACH (OUTPATIENT)
Dept: CASE MANAGEMENT | Age: 82
End: 2022-11-29

## 2022-11-29 NOTE — PROGRESS NOTES
Ambulatory Care Social Work   Follow Up Note  11/29/2022     Goals Addressed                      This Visit's Progress      Connect with [employer] (pt-stated)   On track      11/29/22  Received a notice that the patient has not yet read the [employer] message sent by this  on 11/14/22. Called and left a VM message for the patient today. Plan:  Ongoing psychosocial support and resource referral, as desired by the patient. This  will follow up with the patient next week, to check on her status. EPC     11/14/22  Connected with the patient via phone today. Introduced self and role, and offered support. The patient identified that her primary concerns are related to her 's memory impairment. Specifically, she is concerned about her safety, and that of her 's, as it relates to his driving, as well as her 's management and control of their financial affairs. Patient states that she does not feel that she is in any imminent danger, or safety risk. Provided the 24/7 Helpline through the Alzheimer's Association, P#1-784.746.3365, and directed her to the website [employer]. Also sent this information through [address], and included the link to ContestSeeker[employer]. org/> that allows caregivers to create an action plan, and links information to support and local resources. Plan:  Ongoing psychosocial support and resource referral, as desired by the patient. This  will follow up with the patient in two weeks, to check on her status, and progress with the resources provided today.      MITRA Chan/CHARLY, [facility]   L#[phone]

## 2022-12-01 ENCOUNTER — HOSPITAL ENCOUNTER (OUTPATIENT)
Dept: PHYSICAL THERAPY | Age: 82
Discharge: HOME | End: 2022-12-01
Payer: MEDICARE

## 2022-12-01 PROCEDURE — 97110 THERAPEUTIC EXERCISES: CPT | Performed by: PHYSICAL MEDICINE & REHABILITATION

## 2022-12-01 NOTE — PROGRESS NOTES
PT DAILY TREATMENT NOTE - [facility] 2-15    Patient Name: John Rodgers  Date:2022  : 1940  [x]  Patient  Verified  Payor: [place] MEDICARE / Plan: [place] MEDICARE PART A & B / Product Type: Medicare /    In time:1130a  Out time:1220p  Total Treatment Time (min): 50  Total Timed Codes (min): 40  1:1 Treatment Time ([facility] only): 40   Visit #: 3      Treatment Area: Pain in left arm [M49.792]    SUBJECTIVE  Pain Level (0-10 scale): 3/10  Any medication changes, allergies to medications, adverse drug reactions, diagnosis change, or new procedure performed?: [x] No    [] Yes (see summary sheet for update)  Subjective functional status/changes:   [] No changes reported  Patient reported she had trouble reproducing HEP    OBJECTIVE    Modality rationale: decrease inflammation, decrease pain, and increase tissue extensibility to improve the patients ability to ADLs, standing and walking tolerance.     Min Type Additional Details    [] Estim: []Att   []Unatt        []TENS instruct                  []IFC  []Premod   []NMES                     []Other:  []w/US   []w/ice   []w/heat  Position:  Location:    []  Traction: [] Cervical       []Lumbar                       [] Prone          []Supine                       []Intermittent   []Continuous Lbs:  [] before manual  [] after manual  []w/heat    []  Ultrasound: []Continuous   [] Pulsed at:                           []1MHz   []3MHz Location:  W/cm2:    [] Paraffin         Location:   []w/heat   10 []  Ice     [x]  Heat  []  Ice massage Position: seated  Location: L elbow    []  Laser  []  Other: Position:  Location:      []  Vasopneumatic Device Pressure:       [] lo [] med [] hi   Temperature:      [x] Skin assessment post-treatment:  [x]intact []redness- no adverse reaction    []redness - adverse reaction:     40 min Therapeutic Exercise:  [x] See flow sheet :   Rationale: increase ROM, increase strength, and improve coordination to improve the patients ability to ADLs, lift and carry          With   [x] TE   [] TA   [] neuro   [] other: Patient Education: [x] Review HEP    [] Progressed/Changed HEP based on:   [] positioning   [] body mechanics   [] transfers   [] heat/ice application    [] other:      Other Objective/Functional Measures:   Great tolerance of today's exercises    Pain Level (0-10 scale) post treatment: 2/10    ASSESSMENT/Changes in Function:     Patient will continue to benefit from skilled PT services to modify and progress therapeutic interventions, address functional mobility deficits, address ROM deficits, address strength deficits, analyze and address soft tissue restrictions, analyze and cue movement patterns, analyze and modify body mechanics/ergonomics, and assess and modify postural abnormalities to attain remaining goals. []  See Plan of Care  []  See progress note/recertification  []  See Discharge Summary         Progress towards goals / Updated goals:  Patient with difficulty following directions for HEP performance.     PLAN  [x]  Upgrade activities as tolerated     [x]  Continue plan of care  [x]  Update interventions per flow sheet       []  Discharge due to:_  []  Other:_      Charles Rueda PTA, OPTA, CPT  12/1/2022

## 2022-12-05 ENCOUNTER — HOSPITAL ENCOUNTER (OUTPATIENT)
Dept: PHYSICAL THERAPY | Age: 82
Discharge: HOME | End: 2022-12-05
Payer: MEDICARE

## 2022-12-05 PROCEDURE — 97110 THERAPEUTIC EXERCISES: CPT | Performed by: PHYSICAL MEDICINE & REHABILITATION

## 2022-12-05 NOTE — PROGRESS NOTES
PT DAILY TREATMENT NOTE - [facility] 2-15    Patient Name: Ivelisse Bell  Date:2022  : 1940  [x]  Patient  Verified  Payor: Yamel Nones / Plan: [place] MEDICARE PART A & B / Product Type: Medicare /    In time:1140a  Out time:1230p  Total Treatment Time (min): 50  Total Timed Codes (min): 40  1:1 Treatment Time ( only): 40   Visit #: 4      Treatment Area: Pain in left arm [V43.648]    SUBJECTIVE  Pain Level (0-10 scale): 2/10  Any medication changes, allergies to medications, adverse drug reactions, diagnosis change, or new procedure performed?: [x] No    [] Yes (see summary sheet for update)  Subjective functional status/changes:   [] No changes reported  Patient reported she had trouble reproducing HEP    OBJECTIVE    Modality rationale: decrease inflammation, decrease pain, and increase tissue extensibility to improve the patients ability to ADLs, standing and walking tolerance.     Min Type Additional Details    [] Estim: []Att   []Unatt        []TENS instruct                  []IFC  []Premod   []NMES                     []Other:  []w/US   []w/ice   []w/heat  Position:  Location:    []  Traction: [] Cervical       []Lumbar                       [] Prone          []Supine                       []Intermittent   []Continuous Lbs:  [] before manual  [] after manual  []w/heat    []  Ultrasound: []Continuous   [] Pulsed at:                           []1MHz   []3MHz Location:  W/cm2:    [] Paraffin         Location:   []w/heat   10 []  Ice     [x]  Heat  []  Ice massage Position: seated  Location: L elbow    []  Laser  []  Other: Position:  Location:      []  Vasopneumatic Device Pressure:       [] lo [] med [] hi   Temperature:      [x] Skin assessment post-treatment:  [x]intact []redness- no adverse reaction    []redness - adverse reaction:     40 min Therapeutic Exercise:  [x] See flow sheet :   Rationale: increase ROM, increase strength, and improve coordination to improve the patients ability to ADLs, lift and carry          With   [x] TE   [] TA   [] neuro   [] other: Patient Education: [x] Review HEP    [] Progressed/Changed HEP based on:   [] positioning   [] body mechanics   [] transfers   [] heat/ice application    [] other:      Other Objective/Functional Measures:   No increase in pain with today's new strength exercises    Pain Level (0-10 scale) post treatment: 1/10    ASSESSMENT/Changes in Function:     Patient will continue to benefit from skilled PT services to modify and progress therapeutic interventions, address functional mobility deficits, address ROM deficits, address strength deficits, analyze and address soft tissue restrictions, analyze and cue movement patterns, analyze and modify body mechanics/ergonomics, and assess and modify postural abnormalities to attain remaining goals. []  See Plan of Care  []  See progress note/recertification  []  See Discharge Summary         Progress towards goals / Updated goals: Will continue to progress as tolerated.      PLAN  [x]  Upgrade activities as tolerated     [x]  Continue plan of care  [x]  Update interventions per flow sheet       []  Discharge due to:_  []  Other:_      Jayde Bowens PTA, OPTA, CPT  12/5/2022

## 2022-12-06 ENCOUNTER — PATIENT OUTREACH (OUTPATIENT)
Dept: CASE MANAGEMENT | Age: 82
End: 2022-12-06

## 2022-12-06 NOTE — LETTER
December 6, 2022     Ms. William Hanna  [address] [id number]    My name is Rosalie Fall. I am a  with [facility]. I often work with patients who could benefit from additional resources in the community. We are committed to providing you excellent care. I have been unable to reach you via phone or [address] Please contact me at P#[phone], if you would like additional help with community resources. We appreciate the confidence you've shown by selecting us to provide your healthcare needs and I look forward to hearing from you soon.                Sincerely,      Lisy Guzman LCSW

## 2022-12-06 NOTE — PROGRESS NOTES
Ambulatory Care Social Work   Follow Up Note  12/6/2022     Goals Addressed                      This Visit's Progress      Connect with [employer] (pt-stated)   On track      12/06/22  Unable to reach the patient via her home or mobile numbers today. Left a voicemail on her mobile number. Mailed a Get In Touch letter to the patient's home address. Plan:  Ongoing psychosocial support and resource referral, as desired by the patient. This  will close this referral if no response is received within the next two weeks. EPC     11/29/22  Received a notice that the patient has not yet read the [employer] message sent by this  on 11/14/22. Called and left a VM message for the patient today. Plan:  Ongoing psychosocial support and resource referral, as desired by the patient. This  will follow up with the patient next week, to check on her status. EPC     11/14/22  Connected with the patient via phone today. Introduced self and role, and offered support. The patient identified that her primary concerns are related to her 's memory impairment. Specifically, she is concerned about her safety, and that of her 's, as it relates to his driving, as well as her 's management and control of their financial affairs. Patient states that she does not feel that she is in any imminent danger, or safety risk. Provided the 24/7 Helpline through the [employer], P#[phone], and directed her to the website [employer]. Also sent this information through [address], and included the link to [employer]. org/> that allows caregivers to create an action plan, and links information to support and local resources. Plan:  Ongoing psychosocial support and resource referral, as desired by the patient.   This  will follow up with the patient in two weeks, to check on her status, and progress with the resources provided today.      MITRA Devi/CAHRLY, [facility]   V#[phone]

## 2022-12-08 ENCOUNTER — HOSPITAL ENCOUNTER (OUTPATIENT)
Dept: PHYSICAL THERAPY | Age: 82
Discharge: HOME | End: 2022-12-08
Payer: MEDICARE

## 2022-12-08 PROCEDURE — 97110 THERAPEUTIC EXERCISES: CPT

## 2022-12-08 NOTE — PROGRESS NOTES
PT DAILY TREATMENT NOTE - [facility] 2-15    Patient Name: Doris Beck  Date:2022  : 1940  [x]  Patient  Verified  Payor: Leida Kim / Plan: [place] MEDICARE PART A & B / Product Type: Medicare /    In time:11:00a  Out time:11:50 a  Total Treatment Time (min): 50  Total Timed Codes (min): 40  1:1 Treatment Time ( [address] only): 40   Visit #: 5      Treatment Area: Pain in left arm [M79.602]    SUBJECTIVE  Pain Level (0-10 scale): 210  Any medication changes, allergies to medications, adverse drug reactions, diagnosis change, or new procedure performed?: [x] No    [] Yes (see summary sheet for update)  Subjective functional status/changes:   [] No changes reported  Patient reports that she is feeling okay today. OBJECTIVE    Modality rationale: decrease inflammation, decrease pain, and increase tissue extensibility to improve the patients ability to ADLs, standing and walking tolerance.     Min Type Additional Details    [] Estim: []Att   []Unatt        []TENS instruct                  []IFC  []Premod   []NMES                     []Other:  []w/US   []w/ice   []w/heat  Position:  Location:    []  Traction: [] Cervical       []Lumbar                       [] Prone          []Supine                       []Intermittent   []Continuous Lbs:  [] before manual  [] after manual  []w/heat    []  Ultrasound: []Continuous   [] Pulsed at:                           []1MHz   []3MHz Location:  W/cm2:    [] Paraffin         Location:   []w/heat   10 []  Ice     [x]  Heat  []  Ice massage Position: seated  Location: L elbow    []  Laser  []  Other: Position:  Location:      []  Vasopneumatic Device Pressure:       [] lo [] med [] hi   Temperature:      [x] Skin assessment post-treatment:  [x]intact []redness- no adverse reaction    []redness - adverse reaction:     40 min Therapeutic Exercise:  [x] See flow sheet :   Rationale: increase ROM, increase strength, and improve coordination to improve the patients ability to ADLs, lift and carry          With   [x] TE   [] TA   [] neuro   [] other: Patient Education: [x] Review HEP    [] Progressed/Changed HEP based on:   [] positioning   [] body mechanics   [] transfers   [] heat/ice application    [] other:      Other Objective/Functional Measures: Mod fatigue with rows and wall push ups through increased range, no pain     Pain Level (0-10 scale) post treatment: 1/10    ASSESSMENT/Changes in Function:   Patient educated in at home over the door pulley's as she expressed significant pain relief with their use. Patient educated on goal to improve function even if some elbow flexion remains and decreased concerns that she was \"damage\" her elbow with exercise and stretches. Patient will continue to benefit from skilled PT services to modify and progress therapeutic interventions, address functional mobility deficits, address ROM deficits, address strength deficits, analyze and address soft tissue restrictions, analyze and cue movement patterns, analyze and modify body mechanics/ergonomics, and assess and modify postural abnormalities to attain remaining goals.      []  See Plan of Care  []  See progress note/recertification  []  See Discharge Summary         Progress towards goals / Updated goals:  Progress note next session    PLAN  [x]  Upgrade activities as tolerated     [x]  Continue plan of care  [x]  Update interventions per flow sheet       []  Discharge due to:_  []  Other:_      Vega Leal DPT  12/8/2022

## 2022-12-12 ENCOUNTER — HOSPITAL ENCOUNTER (OUTPATIENT)
Dept: PHYSICAL THERAPY | Age: 82
Discharge: HOME | End: 2022-12-12
Payer: MEDICARE

## 2022-12-12 PROCEDURE — 97110 THERAPEUTIC EXERCISES: CPT | Performed by: PHYSICAL MEDICINE & REHABILITATION

## 2022-12-12 NOTE — PROGRESS NOTES
PT DAILY TREATMENT NOTE - [facility] 2-15    Patient Name: Nicolle Dominguez  Date:2022  : 1940  [x]  Patient  Verified  Payor: Laurel Mendoza / Plan: [place] MEDICARE PART A & B / Product Type: Medicare /    In QCYU:[id number]  Out time:1135 a  Total Treatment Time (min): 50  Total Timed Codes (min): 40  1:1 Treatment Time ( only): 40   Visit #: 6      Treatment Area: Pain in left arm [M79.692]    SUBJECTIVE  Pain Level (0-10 scale): 2/10  Any medication changes, allergies to medications, adverse drug reactions, diagnosis change, or new procedure performed?: [x] No    [] Yes (see summary sheet for update)  Subjective functional status/changes:   [] No changes reported  Patient reports that she was sore after last visit    OBJECTIVE    Modality rationale: decrease inflammation, decrease pain, and increase tissue extensibility to improve the patients ability to ADLs, standing and walking tolerance.     Min Type Additional Details    [] Estim: []Att   []Unatt        []TENS instruct                  []IFC  []Premod   []NMES                     []Other:  []w/US   []w/ice   []w/heat  Position:  Location:    []  Traction: [] Cervical       []Lumbar                       [] Prone          []Supine                       []Intermittent   []Continuous Lbs:  [] before manual  [] after manual  []w/heat    []  Ultrasound: []Continuous   [] Pulsed at:                           []1MHz   []3MHz Location:  W/cm2:    [] Paraffin         Location:   []w/heat   10 []  Ice     [x]  Heat  []  Ice massage Position: seated  Location: L elbow    []  Laser  []  Other: Position:  Location:      []  Vasopneumatic Device Pressure:       [] lo [] med [] hi   Temperature:      [x] Skin assessment post-treatment:  [x]intact []redness- no adverse reaction    []redness - adverse reaction:     40 min Therapeutic Exercise:  [x] See flow sheet :   Rationale: increase ROM, increase strength, and improve coordination to improve the patients ability to ADLs, lift and carry          With   [x] TE   [] TA   [] neuro   [] other: Patient Education: [x] Review HEP    [] Progressed/Changed HEP based on:   [] positioning   [] body mechanics   [] transfers   [] heat/ice application    [] other:      Other Objective/Functional Measures: Only verbalize increase in pain with shoulder flexion AROM    Pain Level (0-10 scale) post treatment: 1/10    ASSESSMENT/Changes in Function:   Patient educated in at home over the door pulley's as she expressed significant pain relief with their use. Patient educated on goal to improve function even if some elbow flexion remains and decreased concerns that she was \"damage\" her elbow with exercise and stretches. Patient will continue to benefit from skilled PT services to modify and progress therapeutic interventions, address functional mobility deficits, address ROM deficits, address strength deficits, analyze and address soft tissue restrictions, analyze and cue movement patterns, analyze and modify body mechanics/ergonomics, and assess and modify postural abnormalities to attain remaining goals.      []  See Plan of Care  []  See progress note/recertification  []  See Discharge Summary         Progress towards goals / Updated goals:  Progress note next session    PLAN  [x]  Upgrade activities as tolerated     [x]  Continue plan of care  [x]  Update interventions per flow sheet       []  Discharge due to:_  []  Other:_      Jacky Koyanagi  PTA, OPTA, CPT  12/12/2022

## 2022-12-15 ENCOUNTER — HOSPITAL ENCOUNTER (OUTPATIENT)
Dept: PHYSICAL THERAPY | Age: 82
Discharge: HOME | End: 2022-12-15
Payer: MEDICARE

## 2022-12-15 PROCEDURE — 97110 THERAPEUTIC EXERCISES: CPT

## 2022-12-15 NOTE — PROGRESS NOTES
PT DAILY TREATMENT NOTE - [facility] 2-15    Patient Name: Dorota Pete  Date:12/15/2022  : 1940  [x]  Patient  Verified  Payor: Liane Valverde / Plan: [place] MEDICARE PART A & B / Product Type: Medicare /    In time:11:12 a  Out time: 12:00 a  Total Treatment Time (min): 48  Total Timed Codes (min): 38  1:1 Treatment Time ( [address] only): 38  Visit #: 7      Treatment Area: Pain in left arm [M79.602]    SUBJECTIVE  Pain Level (0-10 scale): 2/10  Any medication changes, allergies to medications, adverse drug reactions, diagnosis change, or new procedure performed?: [x] No    [] Yes (see summary sheet for update)  Subjective functional status/changes:   [] No changes reported  Patient reports that she has been very pleased with her therapy experience. \"I feel stronger and more empowered than I ever have. \"      OBJECTIVE    Modality rationale: decrease inflammation, decrease pain, and increase tissue extensibility to improve the patients ability to ADLs, standing and walking tolerance.     Min Type Additional Details    [] Estim: []Att   []Unatt        []TENS instruct                  []IFC  []Premod   []NMES                     []Other:  []w/US   []w/ice   []w/heat  Position:  Location:    []  Traction: [] Cervical       []Lumbar                       [] Prone          []Supine                       []Intermittent   []Continuous Lbs:  [] before manual  [] after manual  []w/heat    []  Ultrasound: []Continuous   [] Pulsed at:                           []1MHz   []3MHz Location:  W/cm2:    [] Paraffin         Location:   []w/heat   10 []  Ice     [x]  Heat  []  Ice massage Position: seated  Location: L elbow    []  Laser  []  Other: Position:  Location:      []  Vasopneumatic Device Pressure:       [] lo [] med [] hi   Temperature:      [x] Skin assessment post-treatment:  [x]intact []redness- no adverse reaction    []redness - adverse reaction:     38 min Therapeutic Exercise:  [x] See flow sheet :   Rationale: increase ROM, increase strength, and improve coordination to improve the patients ability to ADLs, lift and carry          With   [x] TE   [] TA   [] neuro   [] other: Patient Education: [x] Review HEP    [] Progressed/Changed HEP based on:   [] positioning   [] body mechanics   [] transfers   [] heat/ice application    [] other:      Other Objective/Functional Measures:   Finger ladder 25     Pain Level (0-10 scale) post treatment: 1/10    ASSESSMENT/Changes in Function:   Due to late arrival, progress note held until next session. Patient will continue to benefit from skilled PT services to modify and progress therapeutic interventions, address functional mobility deficits, address ROM deficits, address strength deficits, analyze and address soft tissue restrictions, analyze and cue movement patterns, analyze and modify body mechanics/ergonomics, and assess and modify postural abnormalities to attain remaining goals.      []  See Plan of Care  []  See progress note/recertification  []  See Discharge Summary         Progress towards goals / Updated goals:  Progress note next session    PLAN  [x]  Upgrade activities as tolerated     [x]  Continue plan of care  [x]  Update interventions per flow sheet       []  Discharge due to:_  []  Other:_      Thelma Bruner DPT   12/15/2022

## 2022-12-19 ENCOUNTER — HOSPITAL ENCOUNTER (OUTPATIENT)
Dept: PHYSICAL THERAPY | Age: 82
Discharge: HOME | End: 2022-12-19
Payer: MEDICARE

## 2022-12-19 PROCEDURE — 97110 THERAPEUTIC EXERCISES: CPT | Performed by: PHYSICAL MEDICINE & REHABILITATION

## 2022-12-19 NOTE — PROGRESS NOTES
PT DAILY TREATMENT NOTE - [facility] 2-15    Patient Name: Rahat Neither  Date:2022  : 1940  [x]  Patient  Verified  Payor: Robles Yu / Plan: [place] MEDICARE PART A & B / Product Type: Medicare /    In time:1050a  Out time: 1315 a  Total Treatment Time (min): 50  Total Timed Codes (min): 40  1:1 Treatment Time ( [address] only): 40  Visit #: 8      Treatment Area: Pain in left arm [M79.602]    SUBJECTIVE  Pain Level (0-10 scale): 2/10  Any medication changes, allergies to medications, adverse drug reactions, diagnosis change, or new procedure performed?: [x] No    [] Yes (see summary sheet for update)  Subjective functional status/changes:   [] No changes reported  Patient reports that she is doing well. OBJECTIVE    Modality rationale: decrease inflammation, decrease pain, and increase tissue extensibility to improve the patients ability to ADLs, standing and walking tolerance.     Min Type Additional Details    [] Estim: []Att   []Unatt        []TENS instruct                  []IFC  []Premod   []NMES                     []Other:  []w/US   []w/ice   []w/heat  Position:  Location:    []  Traction: [] Cervical       []Lumbar                       [] Prone          []Supine                       []Intermittent   []Continuous Lbs:  [] before manual  [] after manual  []w/heat    []  Ultrasound: []Continuous   [] Pulsed at:                           []1MHz   []3MHz Location:  W/cm2:    [] Paraffin         Location:   []w/heat   10 []  Ice     [x]  Heat  []  Ice massage Position: seated  Location: L elbow    []  Laser  []  Other: Position:  Location:      []  Vasopneumatic Device Pressure:       [] lo [] med [] hi   Temperature:      [x] Skin assessment post-treatment:  [x]intact []redness- no adverse reaction    []redness - adverse reaction:     40 min Therapeutic Exercise:  [x] See flow sheet :   Rationale: increase ROM, increase strength, and improve coordination to improve the patients ability to ADLs, lift and carry          With   [x] TE   [] TA   [] neuro   [] other: Patient Education: [x] Review HEP    [] Progressed/Changed HEP based on:   [] positioning   [] body mechanics   [] transfers   [] heat/ice application    [] other:      Other Objective/Functional Measures: Tolerated today's progressions well. Full shoulder AROM in all directions  Pain Level (0-10 scale) post treatment: 1/10    ASSESSMENT/Changes in Function:     Patient will continue to benefit from skilled PT services to modify and progress therapeutic interventions, address functional mobility deficits, address ROM deficits, address strength deficits, analyze and address soft tissue restrictions, analyze and cue movement patterns, analyze and modify body mechanics/ergonomics, and assess and modify postural abnormalities to attain remaining goals. []  See Plan of Care  [x]  See progress note/recertification  []  See Discharge Summary         Progress towards goals / Updated goals:  Patient has met all STG and 1/3 LTG at this time. Patient is able to demonstrate full shoulder AROM and full elbow flexion. Patient is still slightly limited with elbow extension but is demonstrating a significant improvement in ADL function. Will continue 2 x week for 3 weeks to prepare for discharge to HEP program.    Short Term Goals: To be accomplished in 4 weeks:  Patient will be independent with initial HEP in order to transition to general wellness program. MET  Patient will demonstrate L elbow extension to -30 degrees or better to ease dressing activities. MET  Patient will demonstrate L shoulder flexion to 155 degrees or better to ease overhead reaching into high cabinets. MET     Long Term Goals: To be accomplished in 12 weeks:  Patient will demonstrate elbow extension to -5 degrees or better to ease overhead reaching into high cabinets. Patient will be able to lift 10# without increase in pain to ease carrying and unloading groceries.    Patient will demonstrate L shoulder flexion to 180 to ease showering and overhead reaching.  MET    PLAN  [x]  Upgrade activities as tolerated     [x]  Continue plan of care  [x]  Update interventions per flow sheet       []  Discharge due to:_  []  Other:_      Fanny July DEANNA, OPTANN, CPT   12/19/2022

## 2022-12-20 ENCOUNTER — PATIENT OUTREACH (OUTPATIENT)
Dept: CASE MANAGEMENT | Age: 82
End: 2022-12-20

## 2022-12-20 NOTE — PROGRESS NOTES
Ambulatory Care Social Work   Follow Up Note  12/20/2022     Goals Addressed                      This Visit's Progress      Connect with Marsha Jackson (pt-stated)   On track      12/20/22  Patient has disengaged from Complex Case Management services. This  has been unable to reach her via phone, and received no response from the letter mailed to her home address. Plan:  No further social work outreach planned at this time. EPC     12/06/22  Unable to reach the patient via her home or mobile numbers today. Left a voicemail on her mobile number. Mailed a Get In Touch letter to the patient's home address. Plan:  Ongoing psychosocial support and resource referral, as desired by the patient. This  will close this referral if no response is received within the next two weeks. EPC     11/29/22  Received a notice that the patient has not yet read the Party[employer] message sent by this  on 11/14/22. Called and left a VM message for the patient today. Plan:  Ongoing psychosocial support and resource referral, as desired by the patient. This  will follow up with the patient next week, to check on her status. EPC     11/14/22  Connected with the patient via phone today. Introduced self and role, and offered support. The patient identified that her primary concerns are related to her 's memory impairment. Specifically, she is concerned about her safety, and that of her 's, as it relates to his driving, as well as her 's management and control of their financial affairs. Patient states that she does not feel that she is in any imminent danger, or safety risk. Provided the 24/7 Helpline through the [employer], P#1-577.573.6321, and directed her to the website [employer]. Also sent this information through [address], and included the link to [employer]. org/> that allows caregivers to create an action plan, and links information to support and local resources. Plan:  Ongoing psychosocial support and resource referral, as desired by the patient. This  will follow up with the patient in two weeks, to check on her status, and progress with the resources provided today.      EPC               MITRA Jean Baptiste/CHARLY, [facility]   S#[phone]

## 2022-12-20 NOTE — PROGRESS NOTES
12/20/2022  4:38 PM    Patient outreach attempt by this ACM today to perform CCM assessment. Attempts to reach patient were unsuccessful. Left voicemail requesting call back and ACM contact information.      Ishmael Berumen RN, BSN - Ambulatory Care Manager  [phone]

## 2022-12-22 ENCOUNTER — HOSPITAL ENCOUNTER (OUTPATIENT)
Dept: PHYSICAL THERAPY | Age: 82
Discharge: HOME | End: 2022-12-22
Payer: MEDICARE

## 2022-12-22 PROCEDURE — 97110 THERAPEUTIC EXERCISES: CPT

## 2022-12-22 NOTE — PROGRESS NOTES
Physical Therapy at [facility],   a part of  [facility]  [address] [facility] [place]  Phone: [phone]      Fax:  [phone]    Progress Note    Name: Darcy Hensley   : 1940   MD: Maren Wooten MD       Treatment Diagnosis: Pain in left arm [M79.602]  Start of Care: 22    Visits from Start of Care: 7  Missed Visits: 0    Summary of Care:therapeutic exercise, neuromuscular re-education, HEP and pain neuroscience education      Assessment / Recommendations: Patient has met all STG and 1/3 LTG at this time. Patient is able to demonstrate full shoulder AROM and full elbow flexion. Patient is still slightly limited with elbow extension but is demonstrating a significant improvement in ADL function. Will continue 2 x week for 4 weeks to prepare for discharge to HEP program.     Short Term Goals: To be accomplished in 4 weeks:  Patient will be independent with initial HEP in order to transition to general wellness program. MET  Patient will demonstrate L elbow extension to -30 degrees or better to ease dressing activities. MET  Patient will demonstrate L shoulder flexion to 155 degrees or better to ease overhead reaching into high cabinets. MET     Long Term Goals: To be accomplished in 12 weeks:  Patient will demonstrate elbow extension to -5 degrees or better to ease overhead reaching into high cabinets. Patient will be able to lift 10# without increase in pain to ease carrying and unloading groceries. Patient will demonstrate L shoulder flexion to 180 to ease showering and overhead reaching.  MET      Romel Girard DPT 2022

## 2022-12-22 NOTE — PROGRESS NOTES
PT DAILY TREATMENT NOTE - [facility] 2-15    Patient Name: Dorota Pete  Date:2022  : 1940  [x]  Patient  Verified  Payor: [place] MEDICARE / Plan: [place] MEDICARE PART A & B / Product Type: Medicare /    In time:11:00 a  Out time: 11:55 a   Total Treatment Time (min): 55  Total Timed Codes (min): 40  1:1 Treatment Time ( only): 40  Visit #: 9      Treatment Area: Pain in left arm [M79.790]    SUBJECTIVE  Pain Level (0-10 scale): 2/10  Any medication changes, allergies to medications, adverse drug reactions, diagnosis change, or new procedure performed?: [x] No    [] Yes (see summary sheet for update)  Subjective functional status/changes:   [] No changes reported    Patient reports that she is not doing well emotionally as her  is not doing well. She is often interrupted in her exercises needing to care for him. She has gotten pulley's but they wouldn't work on her door. OBJECTIVE    Modality rationale: decrease inflammation, decrease pain, and increase tissue extensibility to improve the patients ability to ADLs, standing and walking tolerance.     Min Type Additional Details    [] Estim: []Att   []Unatt        []TENS instruct                  []IFC  []Premod   []NMES                     []Other:  []w/US   []w/ice   []w/heat  Position:  Location:    []  Traction: [] Cervical       []Lumbar                       [] Prone          []Supine                       []Intermittent   []Continuous Lbs:  [] before manual  [] after manual  []w/heat    []  Ultrasound: []Continuous   [] Pulsed at:                           []1MHz   []3MHz Location:  W/cm2:    [] Paraffin         Location:   []w/heat   10 []  Ice     [x]  Heat  []  Ice massage Position: seated  Location: L elbow    []  Laser  []  Other: Position:  Location:      []  Vasopneumatic Device Pressure:       [] lo [] med [] hi   Temperature:      [x] Skin assessment post-treatment:  [x]intact []redness- no adverse reaction    []redness - adverse reaction:     45 min Therapeutic Exercise:  [x] See flow sheet :   Rationale: increase ROM, increase strength, and improve coordination to improve the patients ability to ADLs, lift and carry          With   [x] TE   [] TA   [] neuro   [] other: Patient Education: [x] Review HEP    [] Progressed/Changed HEP based on:   [] positioning   [] body mechanics   [] transfers   [] heat/ice application    [] other:      Other Objective/Functional Measures:   Patient noting onset of lisping and pain with shoulder flexion exercise, resolved with rest     Pain Level (0-10 scale) post treatment: 1/10    ASSESSMENT/Changes in Function:   Reviewed how to set up over the door pulley's, adjusted to be able to perform in standing. Excellent tolerance for therapy. Patient will continue to benefit from skilled PT services to modify and progress therapeutic interventions, address functional mobility deficits, address ROM deficits, address strength deficits, analyze and address soft tissue restrictions, analyze and cue movement patterns, analyze and modify body mechanics/ergonomics, and assess and modify postural abnormalities to attain remaining goals. []  See Plan of Care  [x]  See progress note/recertification  []  See Discharge Summary         Progress towards goals / Updated goals:    Short Term Goals: To be accomplished in 4 weeks:  Patient will be independent with initial HEP in order to transition to general wellness program. MET  Patient will demonstrate L elbow extension to -30 degrees or better to ease dressing activities. MET  Patient will demonstrate L shoulder flexion to 155 degrees or better to ease overhead reaching into high cabinets. MET     Long Term Goals: To be accomplished in 12 weeks:  Patient will demonstrate elbow extension to -5 degrees or better to ease overhead reaching into high cabinets. Patient will be able to lift 10# without increase in pain to ease carrying and unloading groceries.    Patient will demonstrate L shoulder flexion to 180 to ease showering and overhead reaching.  MET    PLAN  [x]  Upgrade activities as tolerated     [x]  Continue plan of care  [x]  Update interventions per flow sheet       []  Discharge due to:_  []  Other:_      Kp Kim DPT   12/22/2022

## 2022-12-27 ENCOUNTER — APPOINTMENT (OUTPATIENT)
Dept: PHYSICAL THERAPY | Age: 82
End: 2022-12-27
Payer: MEDICARE

## 2022-12-29 ENCOUNTER — HOSPITAL ENCOUNTER (OUTPATIENT)
Dept: PHYSICAL THERAPY | Age: 82
End: 2022-12-29
Payer: MEDICARE

## 2022-12-29 PROCEDURE — 97110 THERAPEUTIC EXERCISES: CPT

## 2022-12-29 NOTE — PROGRESS NOTES
PT DAILY TREATMENT NOTE - [facility] 2-15    Patient Name: Amanda Lovell  Date:2022  : 1940  [x]  Patient  Verified  Payor: Nelia Grit / Plan: [place] MEDICARE PART A & B / Product Type: Medicare /    In time:11:00 a  Out time: 11:50 a   Total Treatment Time (min): 50  Total Timed Codes (min): 40  1:1 Treatment Time ( only): 40  Visit #: 10      Treatment Area: Pain in left arm [M79.602]    SUBJECTIVE  Pain Level (0-10 scale): 10  Any medication changes, allergies to medications, adverse drug reactions, diagnosis change, or new procedure performed?: [x] No    [] Yes (see summary sheet for update)  Subjective functional status/changes:   [] No changes reported    Patient reports that she is feeling better today, had to cancel last session due to illness. She feels confident that she can perform her exercises at the gym after 2 sessions next week. She feels more balanced, only struggling with picking up pills with her L hand. OBJECTIVE    Modality rationale: decrease inflammation, decrease pain, and increase tissue extensibility to improve the patients ability to ADLs, standing and walking tolerance.     Min Type Additional Details    [] Estim: []Att   []Unatt        []TENS instruct                  []IFC  []Premod   []NMES                     []Other:  []w/US   []w/ice   []w/heat  Position:  Location:    []  Traction: [] Cervical       []Lumbar                       [] Prone          []Supine                       []Intermittent   []Continuous Lbs:  [] before manual  [] after manual  []w/heat    []  Ultrasound: []Continuous   [] Pulsed at:                           []1MHz   []3MHz Location:  W/cm2:    [] Paraffin         Location:   []w/heat   10 []  Ice     [x]  Heat  []  Ice massage Position: seated  Location: L elbow    []  Laser  []  Other: Position:  Location:      []  Vasopneumatic Device Pressure:       [] lo [] med [] hi   Temperature:      [x] Skin assessment post-treatment:  [x]intact []redness- no adverse reaction    []redness - adverse reaction:     40 min Therapeutic Exercise:  [x] See flow sheet :   Rationale: increase ROM, increase strength, and improve coordination to improve the patients ability to ADLs, lift and carry          With   [x] TE   [] TA   [] neuro   [] other: Patient Education: [x] Review HEP    [] Progressed/Changed HEP based on:   [] positioning   [] body mechanics   [] transfers   [] heat/ice application    [] other:      Other Objective/Functional Measures: Finger ladder 25    Pain Level (0-10 scale) post treatment: 1/10    ASSESSMENT/Changes in Function:     Patient will continue to benefit from skilled PT services to modify and progress therapeutic interventions, address functional mobility deficits, address ROM deficits, address strength deficits, analyze and address soft tissue restrictions, analyze and cue movement patterns, analyze and modify body mechanics/ergonomics, and assess and modify postural abnormalities to attain remaining goals. []  See Plan of Care  [x]  See progress note/recertification  []  See Discharge Summary         Progress towards goals / Updated goals: Patient able to add putty squeeze with increased gripping range noted to ease lifting tasks     Short Term Goals: To be accomplished in 4 weeks:  Patient will be independent with initial HEP in order to transition to general wellness program. MET  Patient will demonstrate L elbow extension to -30 degrees or better to ease dressing activities. MET  Patient will demonstrate L shoulder flexion to 155 degrees or better to ease overhead reaching into high cabinets. MET     Long Term Goals: To be accomplished in 12 weeks:  Patient will demonstrate elbow extension to -5 degrees or better to ease overhead reaching into high cabinets. Patient will be able to lift 10# without increase in pain to ease carrying and unloading groceries.    Patient will demonstrate L shoulder flexion to 180 to ease showering and overhead reaching.  MET    PLAN  [x]  Upgrade activities as tolerated     [x]  Continue plan of care  [x]  Update interventions per flow sheet       []  Discharge due to:_  []  Other:_      Myron De Leon DPT   12/29/2022

## 2023-01-03 ENCOUNTER — HOSPITAL ENCOUNTER (OUTPATIENT)
Dept: PHYSICAL THERAPY | Age: 83
Discharge: HOME | End: 2023-01-03
Payer: MEDICARE

## 2023-01-03 PROCEDURE — 97110 THERAPEUTIC EXERCISES: CPT

## 2023-01-03 NOTE — PROGRESS NOTES
PT DAILY TREATMENT NOTE - [facility] 2-15    Patient Name: Jia aNpier  Date:1/3/2023  : 1940  [x]  Patient  Verified  Payor: [place] MEDICARE / Plan: [place] MEDICARE PART A & B / Product Type: Medicare /    In time:10:45 a  Out time: 11:55 a   Total Treatment Time (min): 70  Total Timed Codes (min): 60  1:1 Treatment Time ( [address] only): 54  Visit #: 11      Treatment Area: Pain in left arm [M79.954]    SUBJECTIVE  Pain Level (0-10 scale): 210  Any medication changes, allergies to medications, adverse drug reactions, diagnosis change, or new procedure performed?: [x] No    [] Yes (see summary sheet for update)  Subjective functional status/changes:   [] No changes reported    Patient reports that she has come down with a cold. She has been coughing all weekend but is feeling somewhat better today. OBJECTIVE    Modality rationale: decrease inflammation, decrease pain, and increase tissue extensibility to improve the patients ability to ADLs, standing and walking tolerance.     Min Type Additional Details    [] Estim: []Att   []Unatt        []TENS instruct                  []IFC  []Premod   []NMES                     []Other:  []w/US   []w/ice   []w/heat  Position:  Location:    []  Traction: [] Cervical       []Lumbar                       [] Prone          []Supine                       []Intermittent   []Continuous Lbs:  [] before manual  [] after manual  []w/heat    []  Ultrasound: []Continuous   [] Pulsed at:                           []1MHz   []3MHz Location:  W/cm2:    [] Paraffin         Location:   []w/heat   10 []  Ice     [x]  Heat  []  Ice massage Position: seated  Location: L elbow    []  Laser  []  Other: Position:  Location:      []  Vasopneumatic Device Pressure:       [] lo [] med [] hi   Temperature:      [x] Skin assessment post-treatment:  [x]intact []redness- no adverse reaction    []redness - adverse reaction:     55 min Therapeutic Exercise:  [x] See flow sheet :   Rationale: increase ROM, increase strength, and improve coordination to improve the patients ability to ADLs, lift and carry          With   [x] TE   [] TA   [] neuro   [] other: Patient Education: [x] Review HEP    [] Progressed/Changed HEP based on:   [] positioning   [] body mechanics   [] transfers   [] heat/ice application    [] other:      Other Objective/Functional Measures: Finger ladder 25    Increased difficulty individual finger flexion     Pain Level (0-10 scale) post treatment: 1/10    ASSESSMENT/Changes in Function:     Patient will continue to benefit from skilled PT services to modify and progress therapeutic interventions, address functional mobility deficits, address ROM deficits, address strength deficits, analyze and address soft tissue restrictions, analyze and cue movement patterns, analyze and modify body mechanics/ergonomics, and assess and modify postural abnormalities to attain remaining goals. []  See Plan of Care  [x]  See progress note/recertification  []  See Discharge Summary         Progress towards goals / Updated goals: On track for d/c next session     Short Term Goals: To be accomplished in 4 weeks:  Patient will be independent with initial HEP in order to transition to general wellness program. MET  Patient will demonstrate L elbow extension to -30 degrees or better to ease dressing activities. MET  Patient will demonstrate L shoulder flexion to 155 degrees or better to ease overhead reaching into high cabinets. MET     Long Term Goals: To be accomplished in 12 weeks:  Patient will demonstrate elbow extension to -5 degrees or better to ease overhead reaching into high cabinets. Patient will be able to lift 10# without increase in pain to ease carrying and unloading groceries. Patient will demonstrate L shoulder flexion to 180 to ease showering and overhead reaching.  MET    PLAN  [x]  Upgrade activities as tolerated     [x]  Continue plan of care  [x]  Update interventions per flow sheet       [] Discharge due to:_  []  Other:_      Myron De Leon, DPT   1/3/2023

## 2023-01-05 ENCOUNTER — HOSPITAL ENCOUNTER (OUTPATIENT)
Dept: PHYSICAL THERAPY | Age: 83
Discharge: HOME | End: 2023-01-05
Payer: MEDICARE

## 2023-01-05 PROCEDURE — 97110 THERAPEUTIC EXERCISES: CPT

## 2023-01-05 NOTE — PROGRESS NOTES
PT DAILY TREATMENT NOTE - [facility] 2-15    Patient Name: Vane Sands  Date:2023  : 1940  [x]  Patient  Verified  Payor: [place] MEDICARE / Plan: VA MEDICARE PART A & B / Product Type: Medicare /    In time:11:00 a  Out time: 11:45 a   Total Treatment Time (min): 45  Total Timed Codes (min): 45  1:1 Treatment Time ( [address] only): 39  Visit #: 12      Treatment Area: Pain in left arm [M79.602]    SUBJECTIVE  Pain Level (0-10 scale): 2/10  Any medication changes, allergies to medications, adverse drug reactions, diagnosis change, or new procedure performed?: [x] No    [] Yes (see summary sheet for update)  Subjective functional status/changes:   [] No changes reported    Patient reports that she has been stressed in caring for her  but was able to drive to see friends yesterday. She believes her arm is doing better and she is ready to return to swimming. OBJECTIVE      45 min Therapeutic Exercise:  [x] See flow sheet :   Rationale: increase ROM, increase strength, and improve coordination to improve the patients ability to ADLs, lift and carry          With   [x] TE   [] TA   [] neuro   [] other: Patient Education: [x] Review HEP    [] Progressed/Changed HEP based on:   [] positioning   [] body mechanics   [] transfers   [] heat/ice application    [] other:      Other Objective/Functional Measures: Finger ladder 25  Elbow extension -10       Pain Level (0-10 scale) post treatment: 1/10    ASSESSMENT/Changes in Function:        []  See Plan of Care  []  See progress note/recertification  [x]  See Discharge Summary         Progress towards goals / Updated goals:     Short Term Goals: To be accomplished in 4 weeks:  Patient will be independent with initial HEP in order to transition to general wellness program. MET  Patient will demonstrate L elbow extension to -30 degrees or better to ease dressing activities.  MET  Patient will demonstrate L shoulder flexion to 155 degrees or better to ease overhead reaching into high cabinets. MET     Long Term Goals: To be accomplished in 12 weeks:  Patient will demonstrate elbow extension to -5 degrees or better to ease overhead reaching into high cabinets. Progressing toward   Patient will be able to lift 10# without increase in pain to ease carrying and unloading groceries. MET  Patient will demonstrate L shoulder flexion to 180 to ease showering and overhead reaching.  MET    PLAN  []  Upgrade activities as tolerated     []  Continue plan of care  []  Update interventions per flow sheet       [x]  Discharge due to:goals met   []  Other:_      Cleveland Lal DPT   1/5/2023

## 2023-01-05 NOTE — PROGRESS NOTES
Physical Therapy at [facility],   a part of  [facility]  [facility]  [facility]  [place]  Phone: [phone] Fax: [phone]      Discharge Summary 2-15    Patient name: Babak Zepeda  : 1940  Provider#: [id number]  Referral source: Cesar Mercado MD      Medical/Treatment Diagnosis: Pain in left arm [M79.602]     Prior Hospitalization: see medical history     Comorbidities: See Plan of Care  Prior Level of Function: See Plan of Care  Medications: Verified on Patient Summary List    Start of Care: 22      Onset Date:22   Visits from Start of Care: 12     Missed Visits: 1  Reporting Period : 22 to 23    Assessment/Summary of care: At time of discharge, patient has met 3/3 short term goals and 2/3 long term goals. She demonstrates excellent potential to continue improving her strength, range, and function with continued HEP and return to swimming for exercise. She has maximized therapeutic benefit at this time. Short Term Goals: To be accomplished in 4 weeks:  Patient will be independent with initial HEP in order to transition to general wellness program. MET  Patient will demonstrate L elbow extension to -30 degrees or better to ease dressing activities. MET  Patient will demonstrate L shoulder flexion to 155 degrees or better to ease overhead reaching into high cabinets. MET     Long Term Goals: To be accomplished in 12 weeks:  Patient will demonstrate elbow extension to -5 degrees or better to ease overhead reaching into high cabinets. Progressing toward   Patient will be able to lift 10# without increase in pain to ease carrying and unloading groceries. MET  Patient will demonstrate L shoulder flexion to 180 to ease showering and overhead reaching.  MET        RECOMMENDATIONS:  [x]Discontinue therapy: [x]Patient has reached or is progressing toward set goals     []Patient is non-compliant or has abdicated     []Due to lack of appreciable progress towards set goals     []Other  Tangela Martinez, DPT 1/5/2023

## 2023-01-26 ENCOUNTER — PATIENT OUTREACH (OUTPATIENT)
Dept: CASE MANAGEMENT | Age: 83
End: 2023-01-26
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
## 2023-01-27 NOTE — PROGRESS NOTES
1/27/2023  2:17 PM    Patient outreach attempt by this ACM today to perform CCM assessment. Attempts to reach patient were unsuccessful. Left voicemail requesting call back and ACM contact information.      Massiel Quiroz, RN, BSN - Ambulatory Care Manager  [phone]

## 2023-02-03 ENCOUNTER — PATIENT OUTREACH (OUTPATIENT)
Dept: CASE MANAGEMENT | Age: 83
End: 2023-02-03

## 2023-02-03 NOTE — PROGRESS NOTES
2/3/2023  3:29 PM    Patient outreach attempt by this ACM today to perform CCM assessment. Attempts to reach patient were unsuccessful. Left voicemail requesting call back and ACM contact information.      Lashell Hawley RN, BSN - Ambulatory Care Manager  [phone]

## 2023-02-14 ENCOUNTER — PATIENT OUTREACH (OUTPATIENT)
Dept: CASE MANAGEMENT | Age: 83
End: 2023-02-14
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
## 2023-03-20 ENCOUNTER — PATIENT OUTREACH (OUTPATIENT)
Dept: CASE MANAGEMENT | Age: 83
End: 2023-03-20

## 2023-03-20 NOTE — PROGRESS NOTES
3/20/2023  4:26 PM    Patient outreach attempt by this ACM today to perform CCM assessment/follow up. Attempts to reach patient were unsuccessful. Left voicemail requesting call back with ACM contact information.      Kimberly Cutler RN, BSN - Ambulatory Care Manager  [phone]
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
## 2023-04-19 ENCOUNTER — PATIENT MESSAGE (OUTPATIENT)
Dept: INTERNAL MEDICINE CLINIC | Age: 83
End: 2023-04-19

## 2023-04-19 NOTE — TELEPHONE ENCOUNTER
Pt called the office c/o left hand numbness with certain positions and its making it hard for her to do ADLs. Pt c/o left elbow pain with certain movements that will cause headache/dizziness/ear ache. Pt doesn't want to follow up with her Ortho Surgeon. If she needs to see an Ortho provider she would like pcp to refer her to another practice/provider. Pt would like to know what her next steps should be.

## 2023-04-21 ENCOUNTER — OFFICE VISIT (OUTPATIENT)
Dept: INTERNAL MEDICINE CLINIC | Age: 83
End: 2023-04-21
Payer: MEDICARE

## 2023-04-21 ENCOUNTER — TELEPHONE (OUTPATIENT)
Dept: INTERNAL MEDICINE CLINIC | Age: 83
End: 2023-04-21

## 2023-04-21 VITALS
OXYGEN SATURATION: 96 % | TEMPERATURE: 98.5 F | DIASTOLIC BLOOD PRESSURE: 84 MMHG | HEART RATE: 87 BPM | HEIGHT: 65 IN | WEIGHT: 179.4 LBS | SYSTOLIC BLOOD PRESSURE: 157 MMHG | BODY MASS INDEX: 29.89 KG/M2 | RESPIRATION RATE: 16 BRPM

## 2023-04-21 DIAGNOSIS — R20.0 ARM NUMBNESS LEFT: Primary | ICD-10-CM

## 2023-04-21 DIAGNOSIS — S42.402S CLOSED FRACTURE OF DISTAL END OF LEFT HUMERUS, UNSPECIFIED FRACTURE MORPHOLOGY, SEQUELA: ICD-10-CM

## 2023-04-21 DIAGNOSIS — E21.3 HYPERPARATHYROIDISM (HCC): ICD-10-CM

## 2023-04-21 PROCEDURE — G0463 HOSPITAL OUTPT CLINIC VISIT: HCPCS | Performed by: INTERNAL MEDICINE

## 2023-04-21 PROCEDURE — G8427 DOCREV CUR MEDS BY ELIG CLIN: HCPCS | Performed by: INTERNAL MEDICINE

## 2023-04-21 PROCEDURE — 1101F PT FALLS ASSESS-DOCD LE1/YR: CPT | Performed by: INTERNAL MEDICINE

## 2023-04-21 PROCEDURE — G8417 CALC BMI ABV UP PARAM F/U: HCPCS | Performed by: INTERNAL MEDICINE

## 2023-04-21 PROCEDURE — G8510 SCR DEP NEG, NO PLAN REQD: HCPCS | Performed by: INTERNAL MEDICINE

## 2023-04-21 PROCEDURE — 99213 OFFICE O/P EST LOW 20 MIN: CPT | Performed by: INTERNAL MEDICINE

## 2023-04-21 PROCEDURE — 1090F PRES/ABSN URINE INCON ASSESS: CPT | Performed by: INTERNAL MEDICINE

## 2023-04-21 PROCEDURE — G8536 NO DOC ELDER MAL SCRN: HCPCS | Performed by: INTERNAL MEDICINE

## 2023-04-21 RX ORDER — GABAPENTIN 100 MG/1
100 CAPSULE ORAL 2 TIMES DAILY
Qty: 60 CAPSULE | Refills: 0 | Status: SHIPPED | OUTPATIENT
Start: 2023-04-21

## 2023-04-21 NOTE — TELEPHONE ENCOUNTER
I called pt to advise pcp can see her today for numbness concern, pcp is working pt into the schedule between other pts. Will evaluate this concern today. Pt verbalized understanding and was thankful.

## 2023-04-21 NOTE — TELEPHONE ENCOUNTER
I called pt to see if she available anytime for an appt with pcp, pt states yes she is. She is going swimming now at the [facility], pt states to leave a detail voicemail message on cell phone with appt info. 28-Jan-2017 23:21

## 2023-04-21 NOTE — PROGRESS NOTES
Called and spoke with patient. Patient stated her appt was scheduled as a VV. Patient checked the [employer] neil after scheduling and it said it was a VV. Patient stated she then called office again to verify and was told it was virtual. When patient when to log in she saw it said to arrive by 10:40 AM and was confused. Patient waited for call from office and when didn't receive one, called office and was informed it was in person. Patient is very upset and wants Dr. Francis to know she did not blow off appt. Patient asking if Dr. Francis can do VV this week as she really needs referral and it was not her fault. Kindly advise.    Patient would like a call today.   HISTORY OF PRESENT ILLNESS    Chief Complaint   Patient presents with    Numbness    Other     Not functioning well due to nerves and pain in left arm. Presents with left arm numbness for months  Hurts more when using computer, cell phone  Left hand numbness to left 3rd fingers. Elbow is sore all day. Hx of left humeral fx 4/8/23 and hardware placement 4/18/23. Removal of hardware 2/3/23 Dr Ghazal Almonte. Pain leads to increased headaches. 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 has a past surgical history that includes hx cholecystectomy; hx colonoscopy (2016); hx thyroidectomy (2004); hx knee arthroscopy (Right, 04/11/2017); hx knee replacement; hx hip fracture tx (Left, 06/16/2019); and hx other surgical (04/2022). reports that she has never smoked. She has never used smokeless tobacco. She reports that she does not currently use alcohol after a past usage of about 2.0 standard drinks per week. She reports that she does not use drugs. family history includes Diabetes in her father; Heart Disease in her father; Stroke in her mother; Thyroid Cancer in her brother. Physical Exam   Nursing note and vitals reviewed. Blood pressure (!) 157/84, pulse 87, temperature 98.5 °F (36.9 °C), temperature source Oral, resp. rate 16, height 5' 5\" (1.651 m), weight 179 lb 6.4 oz (81.4 kg), SpO2 96 %. Constitutional: In no distress.     Eyes: Conjunctivae are normal.  HEENT:  No LAD or thyromegaly   Cardiovascular: Normal rate. regular rhythm. No murmurs  No edema  Pulmonary/Chest: Effort normal. clear to ausculation blaterally  Musculoskeletal:  no edema. Abd:  Neurological: Alert and oriented. Grossly intact cranial nerves and motor function. Skin: No visible rash noted. Psychiatric: Normal mood and affect. Behavior is normal.     ASSESSMENT and PLAN  1. Arm numbness left  2. Closed fracture of distal end of left humerus, unspecified fracture morphology, sequela  Complication of injury/fall with nerve damage. Not improved s/p hardware removal.   Refer for consult, EMG? I think s/s are unlikely \"repairable\" and she would do best try med rx.  -     REFERRAL TO ORTHOPEDICS  -     gabapentin (NEURONTIN) 100 mg capsule; Take 1 Capsule by mouth two (2) times a day. Max Daily Amount: 200 mg., Normal, Disp-60 Capsule, R-0    3. Hyperparathyroidism ([address]  Currently asymptomatic  Repeat calcium 6/2023      lab results and schedule of future lab studies reviewed with patient  reviewed medications and side effects in detail    Return to clinic for further evaluation if new symptoms develop or if current symptoms worsen or fail to resolve.

## 2023-04-26 ENCOUNTER — PATIENT OUTREACH (OUTPATIENT)
Dept: CASE MANAGEMENT | Age: 83
End: 2023-04-26
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
## 2023-05-01 ENCOUNTER — OFFICE VISIT (OUTPATIENT)
Dept: ORTHOPEDIC SURGERY | Age: 83
End: 2023-05-01

## 2023-05-01 VITALS — BODY MASS INDEX: 29.82 KG/M2 | HEIGHT: 65 IN | WEIGHT: 179 LBS

## 2023-05-01 DIAGNOSIS — G56.02 LEFT CARPAL TUNNEL SYNDROME: ICD-10-CM

## 2023-05-01 DIAGNOSIS — G56.22 CUBITAL TUNNEL SYNDROME ON LEFT: ICD-10-CM

## 2023-05-01 DIAGNOSIS — M25.522 LEFT ELBOW PAIN: Primary | ICD-10-CM

## 2023-05-01 DIAGNOSIS — S42.472S: ICD-10-CM

## 2023-05-01 NOTE — PATIENT INSTRUCTIONS
Elbow: Exercises  Introduction  Here are some examples of exercises for you to try. The exercises may be suggested for a condition or for rehabilitation. Start each exercise slowly. Ease off the exercises if you start to have pain. You will be told when to start these exercises and which ones will work best for you. How to do the exercises  Wrist flexor stretch  Extend your arm in front of you with your palm up. Bend your wrist, pointing your hand toward the floor. With your other hand, gently bend your wrist farther until you feel a mild to moderate stretch in your forearm. Hold for at least 15 to 30 seconds. Repeat 2 to 4 times. Wrist extensor stretch  Repeat steps 1 to 4 of the stretch above but begin with your extended hand palm down. Ball or sock squeeze  Hold a tennis ball (or a rolled-up sock) in your hand. Make a fist around the ball (or sock) and squeeze. Hold for about 6 seconds, and then relax for up to 10 seconds. Repeat 8 to 12 times. Switch the ball (or sock) to your other hand and do 8 to 12 times. Wrist deviation  Sit so that your arm is supported but your hand hangs off the edge of a flat surface, such as a table. Hold your hand out like you are shaking hands with someone. Move your hand up and down. Repeat this motion 8 to 12 times. Switch arms. Try to do this exercise twice with each hand. Wrist curls  Place your forearm on a table with your hand hanging over the edge of the table, palm up. Place a 1- to 2-pound weight in your hand. This may be a dumbbell, a can of food, or a filled water bottle. Slowly raise and lower the weight while keeping your forearm on the table and your palm facing up. Repeat this motion 8 to 12 times. Switch arms, and do steps 1 through 4. Repeat with your hand facing down toward the floor. Switch arms. Biceps curls  Sit leaning forward with your legs slightly spread and your left hand on your left thigh.   Place your right elbow on your right thigh, and hold the weight with your forearm horizontal.  Slowly curl the weight up and toward your chest.  Repeat this motion 8 to 12 times. Switch arms, and do steps 1 through 4. Follow-up care is a key part of your treatment and safety. Be sure to make and go to all appointments, and call your doctor if you are having problems. It's also a good idea to know your test results and keep a list of the medicines you take. Current as of: November 9, 2022               Content Version: 13.6  © 2006-2023 [employer]. Care instructions adapted under license by [employer] (which disclaims liability or warranty for this information). If you have questions about a medical condition or this instruction, always ask your healthcare professional. [address] any warranty or liability for your use of this information. Electromyogram (EMG) and Nerve Conduction Studies: About These Tests  What are they? An electromyogram (EMG) measures the electrical activity of your muscles when you are not using them (at rest) and when you tighten them (muscle contraction). Nerve conduction studies (NCS) measure how well and how fast the nerves can send electrical signals. EMG and nerve conduction studies are often done together. If they are done together, the nerve conduction studies are done before the EMG. Why are they done? You may need an EMG to find diseases that damage your muscles or nerves or to find out why you can't move your muscles (paralysis), why they feel weak, or why they twitch. These problems may include a herniated disc, amyotrophic lateral sclerosis (ALS), or myasthenia gravis (MG). You may need nerve conduction studies to find damage to the nerves that connect the brain and spinal cord to the rest of the body. (This is called the peripheral nervous system.) These studies are often used to help find nerve disorders, such as carpal tunnel syndrome.   How do you prepare for these tests? Wear loose-fitting clothing. You may be given a hospital gown to wear. The electrodes for the test are attached to your skin. Your skin needs to be clean and free of sprays, oils, creams, and lotions. You may be asked to sign a consent form that says you understand the risks of the test and agree to have it done. Tell your doctor ALL the medicines, vitamins, supplements, and herbal remedies you take. Some may increase the risk of problems during your test. Your doctor will tell you if you should stop taking any of them before the test and how soon to do it. If you take a medicine that prevents blood clots, your doctor may tell you to stop taking it before your test. Or your doctor may tell you to keep taking it. (These medicines include aspirin and other blood thinners.) Make sure that you understand exactly what your doctor wants you to do. How are the tests done? You lie on a table or bed or sit in a reclining chair so your muscles are relaxed. For an EMG:   Your doctor will insert a needle electrode into a muscle. This will record the electrical activity while the muscle is at rest.  Your doctor will ask you to tighten the same muscle slowly and steadily while the electrical activity is recorded. Your doctor may move the electrode to a different area of the muscle or a different muscle. For nerve conduction studies:   Your doctor will attach two types of electrodes to your skin. One type of electrode is placed over a nerve and will give the nerve an electrical pulse. The other type of electrode is placed over the muscle that the nerve controls. It will record how long it takes the muscle to react to the electrical pulse. How does having electromyogram (EMG) and nerve conduction studies feel? During an EMG test, you may feel a quick, sharp pain when the needle electrode is put into a muscle. With nerve conduction studies, you will be able to feel the electrical pulses.  The tests make some people anxious. Keep in mind that only a very low-voltage electrical current is used. And each electrical pulse is very quick. It lasts less than a second. How long do they take? An EMG may take 30 to 60 minutes. Nerve conduction tests may take from 15 minutes to 1 hour or more. It depends on how many nerves and muscles your doctor tests. What happens after these tests? After the test, you may be sore and feel a tingling in your muscles. This may last for up to 2 days. If any of the test areas are sore:  Put ice or a cold pack on the area for 10 to 20 minutes at a time. Put a thin cloth between the ice and your skin. Take an over-the-counter pain medicine, such as acetaminophen (Tylenol), ibuprofen (Advil, Motrin), or naproxen (Aleve). Be safe with medicines. Read and follow all instructions on the label. You will probably be able to go home right away. It depends on the reason for the test.  You can go back to your usual activities right away. When should you call for help? Watch closely for changes in your health, and be sure to contact your doctor if:  Muscle pain from an EMG test gets worse or you have swelling, tenderness, or pus at any of the needle sites. You have any problems that you think may be from the test.  You have any questions about the test or have not received your results. Follow-up care is a key part of your treatment and safety. Be sure to make and go to all appointments, and call your doctor if you are having problems. It's also a good idea to keep a list of the medicines you take. Ask your doctor when you can expect to have your test results. Where can you learn more? Go to http://www.gray.com/  Enter [id number] in the search box to learn more about \"Electromyogram (EMG) and Nerve Conduction Studies: About These Tests. \"  Current as of: August 25, 2022               Content Version: 13.6  © 9279-9531 Healthwise, [facility].    Care instructions adapted under license by [employer] (which disclaims liability or warranty for this information). If you have questions about a medical condition or this instruction, always ask your healthcare professional. [address] any warranty or liability for your use of this information.

## 2023-05-23 RX ORDER — GABAPENTIN 100 MG/1
100 CAPSULE ORAL 2 TIMES DAILY
COMMUNITY
Start: 2023-04-21

## 2023-05-23 RX ORDER — ASPIRIN 81 MG/1
TABLET ORAL EVERY 4 HOURS PRN
COMMUNITY

## 2023-06-22 ENCOUNTER — OFFICE VISIT (OUTPATIENT)
Age: 83
End: 2023-06-22
Payer: MEDICARE

## 2023-06-22 VITALS
BODY MASS INDEX: 29.29 KG/M2 | RESPIRATION RATE: 17 BRPM | TEMPERATURE: 98.1 F | SYSTOLIC BLOOD PRESSURE: 138 MMHG | DIASTOLIC BLOOD PRESSURE: 80 MMHG | HEART RATE: 68 BPM | WEIGHT: 175.8 LBS | HEIGHT: 65 IN | OXYGEN SATURATION: 95 %

## 2023-06-22 DIAGNOSIS — I10 BENIGN ESSENTIAL HTN: ICD-10-CM

## 2023-06-22 DIAGNOSIS — E21.3 HYPERPARATHYROIDISM (HCC): ICD-10-CM

## 2023-06-22 DIAGNOSIS — S42.402S UNSPECIFIED FRACTURE OF LOWER END OF LEFT HUMERUS, SEQUELA: ICD-10-CM

## 2023-06-22 DIAGNOSIS — R20.2 LEFT HAND PARESTHESIA: Primary | ICD-10-CM

## 2023-06-22 DIAGNOSIS — G43.709 CHRONIC MIGRAINE WITHOUT AURA WITHOUT STATUS MIGRAINOSUS, NOT INTRACTABLE: ICD-10-CM

## 2023-06-22 PROBLEM — H35.369 MACULAR DRUSEN: Status: ACTIVE | Noted: 2023-03-17

## 2023-06-22 PROBLEM — H02.30 EXCESS SKIN OF EYELID: Status: ACTIVE | Noted: 2023-03-17

## 2023-06-22 PROBLEM — H04.129 TEAR FILM INSUFFICIENCY: Status: ACTIVE | Noted: 2023-03-17

## 2023-06-22 PROBLEM — H25.10 NUCLEAR SENILE CATARACT: Status: ACTIVE | Noted: 2023-03-17

## 2023-06-22 PROCEDURE — G8419 CALC BMI OUT NRM PARAM NOF/U: HCPCS | Performed by: INTERNAL MEDICINE

## 2023-06-22 PROCEDURE — G8428 CUR MEDS NOT DOCUMENT: HCPCS | Performed by: INTERNAL MEDICINE

## 2023-06-22 PROCEDURE — 1090F PRES/ABSN URINE INCON ASSESS: CPT | Performed by: INTERNAL MEDICINE

## 2023-06-22 PROCEDURE — 3079F DIAST BP 80-89 MM HG: CPT | Performed by: INTERNAL MEDICINE

## 2023-06-22 PROCEDURE — G8399 PT W/DXA RESULTS DOCUMENT: HCPCS | Performed by: INTERNAL MEDICINE

## 2023-06-22 PROCEDURE — 1036F TOBACCO NON-USER: CPT | Performed by: INTERNAL MEDICINE

## 2023-06-22 PROCEDURE — 3075F SYST BP GE 130 - 139MM HG: CPT | Performed by: INTERNAL MEDICINE

## 2023-06-22 PROCEDURE — 99213 OFFICE O/P EST LOW 20 MIN: CPT | Performed by: INTERNAL MEDICINE

## 2023-06-22 PROCEDURE — 1123F ACP DISCUSS/DSCN MKR DOCD: CPT | Performed by: INTERNAL MEDICINE

## 2023-06-22 NOTE — PROGRESS NOTES
includes Cholecystectomy; Colonoscopy (2016); Thyroidectomy (2004); Knee arthroscopy (Right, 04/11/2017); Total knee arthroplasty; Hip fracture surgery (Left, 06/16/2019); and other surgical history (04/2022). reports that she has never smoked. She has never used smokeless tobacco. She reports that she does not currently use alcohol after a past usage of about 2.0 standard drinks per week. She reports that she does not use drugs. family history includes Diabetes in her father; Heart Disease in her father; Stroke in her mother; Thyroid Cancer in her brother. Physical Exam   Nursing note and vitals reviewed. Blood pressure 138/80, pulse 68, temperature 98.1 °F (36.7 °C), temperature source Oral, resp. rate 17, height 5' 5\" (1.651 m), weight 175 lb 12.8 oz (79.7 kg), SpO2 95 %. Constitutional:  No distress. Eyes: Conjunctivae are normal.   Ears:  Hearing grossly intact  Cardiovascular: Normal rate. regular rhythm, no murmurs or gallops  No edema  Pulmonary/Chest: Effort normal.   CTAB  Musculoskeletal: moves all 4 extremities   Neurological: Alert and oriented to person, place, and time. Skin: No visible rash noted. Psychiatric: Normal mood and affect. Behavior is normal.     Assessment and Plan    Shamar Mueller was seen today for headache, numbness and elbow injury. Diagnoses and all orders for this visit:    Left arm, elbow, hand pain and paresthesia  Fracture of lower end of left humerus, sequela  Ongoing significant neuropathic pain of the left arm. Onset was April 2022, 14 months ago, after humeral fracture. Appreciate input of Dr. Ana Lilia Goetz and of Dr. Aleshia Gibbons. I do recommend EMG and I will try to refer him to Dr. Gem Saldana to set this up. She has follow-up with Dr. Ana Lilia Goetz on July 11. I think this was the next step that he was going to order EMG anyway. Consider neurology consultation after EMG is back, if any difficulties in interpreting EMG.   Discussed how it is very possible that this condition will

## 2023-06-23 ENCOUNTER — TELEPHONE (OUTPATIENT)
Age: 83
End: 2023-06-23

## 2023-06-23 LAB
ANION GAP SERPL CALC-SCNC: 4 MMOL/L (ref 5–15)
BUN SERPL-MCNC: 24 MG/DL (ref 6–20)
BUN/CREAT SERPL: 30 (ref 12–20)
CALCIUM SERPL-MCNC: 10.2 MG/DL (ref 8.5–10.1)
CALCIUM SERPL-MCNC: 10.5 MG/DL (ref 8.5–10.1)
CHLORIDE SERPL-SCNC: 106 MMOL/L (ref 97–108)
CO2 SERPL-SCNC: 29 MMOL/L (ref 21–32)
CREAT SERPL-MCNC: 0.81 MG/DL (ref 0.55–1.02)
ERYTHROCYTE [DISTWIDTH] IN BLOOD BY AUTOMATED COUNT: 13.4 % (ref 11.5–14.5)
GLUCOSE SERPL-MCNC: 108 MG/DL (ref 65–100)
HCT VFR BLD AUTO: 44.1 % (ref 35–47)
HGB BLD-MCNC: 13.6 G/DL (ref 11.5–16)
MCH RBC QN AUTO: 28.7 PG (ref 26–34)
MCHC RBC AUTO-ENTMCNC: 30.8 G/DL (ref 30–36.5)
MCV RBC AUTO: 93 FL (ref 80–99)
NRBC # BLD: 0 K/UL (ref 0–0.01)
NRBC BLD-RTO: 0 PER 100 WBC
PLATELET # BLD AUTO: 299 K/UL (ref 150–400)
PMV BLD AUTO: 9.8 FL (ref 8.9–12.9)
POTASSIUM SERPL-SCNC: 4.6 MMOL/L (ref 3.5–5.1)
PTH-INTACT SERPL-MCNC: 169.3 PG/ML (ref 18.4–88)
RBC # BLD AUTO: 4.74 M/UL (ref 3.8–5.2)
SODIUM SERPL-SCNC: 139 MMOL/L (ref 136–145)
WBC # BLD AUTO: 8.1 K/UL (ref 3.6–11)

## 2023-06-23 NOTE — TELEPHONE ENCOUNTER
Spoke with patient and advised that a Referral was made to Dr. Melissa Lockett at [facility] faxed with office notes F# [phone]. For Left Arm and hand numbness. She states understanding and requests that this writer schedule her appt. Advised that his office is probably closed by now and I would try to call on Monday to get her scheduled. Grateful for the call.

## 2023-06-28 ENCOUNTER — TELEPHONE (OUTPATIENT)
Age: 83
End: 2023-06-28

## 2023-07-07 ENCOUNTER — TELEPHONE (OUTPATIENT)
Age: 83
End: 2023-07-07

## 2023-07-07 DIAGNOSIS — E21.3 HYPERPARATHYROIDISM (HCC): Primary | ICD-10-CM

## 2023-07-07 NOTE — TELEPHONE ENCOUNTER
Spoke with patient and updated on Dr. Thuy Beckett comments, recommendations regarding her lab Values. Your calcium level remains a little bit high. It is 10.2 on one test and 10.5 on the other test.  It is high because you have hyperparathyroidism. The PTH (parathyroid hormone) and it is high. This means that one of your parathyroid glands of your neck are producing excess hormone and this is increasing your calcium levels. This can be addressed by doing a nuclear scan of your parathyroid glands to see which of the glands is producing excessive hormone and then consulting with the surgeon about having that glands removed. Since the calcium level is only mildly elevated, we could simply observe this for now and repeat the calcium levels in 6 months if you prefer. Let me know if you would like to proceed with the scan. Patient states understanding and would like the scan. Dr. Janett Correa notified.

## 2023-07-12 ENCOUNTER — HOSPITAL ENCOUNTER (OUTPATIENT)
Facility: HOSPITAL | Age: 83
Discharge: HOME | End: 2023-07-15
Attending: INTERNAL MEDICINE
Payer: MEDICARE

## 2023-07-12 DIAGNOSIS — R93.89 ABNORMAL IMAGING OF THYROID: Primary | ICD-10-CM

## 2023-07-12 DIAGNOSIS — E21.3 HYPERPARATHYROIDISM (HCC): ICD-10-CM

## 2023-07-12 PROCEDURE — 3430000000 HC RX DIAGNOSTIC RADIOPHARMACEUTICAL: Performed by: INTERNAL MEDICINE

## 2023-07-12 PROCEDURE — 78071 PARATHYRD PLANAR W/WO SUBTRJ: CPT

## 2023-07-12 PROCEDURE — A9500 TC99M SESTAMIBI: HCPCS | Performed by: INTERNAL MEDICINE

## 2023-07-12 RX ORDER — TETRAKIS(2-METHOXYISOBUTYLISOCYANIDE)COPPER(I) TETRAFLUOROBORATE 1 MG/ML
21.8 INJECTION, POWDER, LYOPHILIZED, FOR SOLUTION INTRAVENOUS
Status: COMPLETED | OUTPATIENT
Start: 2023-07-12 | End: 2023-07-12

## 2023-07-12 RX ADMIN — TETRAKIS(2-METHOXYISOBUTYLISOCYANIDE)COPPER(I) TETRAFLUOROBORATE 21.8 MILLICURIE: 1 INJECTION, POWDER, LYOPHILIZED, FOR SOLUTION INTRAVENOUS at 09:27

## 2023-07-19 ENCOUNTER — HOSPITAL ENCOUNTER (OUTPATIENT)
Facility: HOSPITAL | Age: 83
Discharge: HOME | End: 2023-07-22
Attending: INTERNAL MEDICINE
Payer: MEDICARE

## 2023-07-19 DIAGNOSIS — R93.89 ABNORMAL IMAGING OF THYROID: ICD-10-CM

## 2023-07-19 DIAGNOSIS — E21.3 HYPERPARATHYROIDISM (HCC): ICD-10-CM

## 2023-07-19 PROCEDURE — 76536 US EXAM OF HEAD AND NECK: CPT

## 2023-07-23 DIAGNOSIS — E04.1 NODULE OF RIGHT LOBE OF THYROID GLAND: Primary | ICD-10-CM

## 2023-08-09 ENCOUNTER — TELEPHONE (OUTPATIENT)
Age: 83
End: 2023-08-09

## 2023-08-09 DIAGNOSIS — H25.019 CORTICAL AGE-RELATED CATARACT, UNSPECIFIED LATERALITY: ICD-10-CM

## 2023-08-09 DIAGNOSIS — G89.29 OTHER CHRONIC PAIN: ICD-10-CM

## 2023-08-09 DIAGNOSIS — R29.898 WEAKNESS OF BOTH LOWER EXTREMITIES: ICD-10-CM

## 2023-08-09 DIAGNOSIS — F41.8 SITUATIONAL ANXIETY: ICD-10-CM

## 2023-08-09 DIAGNOSIS — R41.0 MENTAL CONFUSION: ICD-10-CM

## 2023-08-09 DIAGNOSIS — F41.8 OTHER SPECIFIED ANXIETY DISORDERS: ICD-10-CM

## 2023-08-09 DIAGNOSIS — R26.81 GAIT INSTABILITY: ICD-10-CM

## 2023-08-09 DIAGNOSIS — R26.2 DIFFICULTY WALKING: ICD-10-CM

## 2023-08-09 DIAGNOSIS — R20.2 LEFT HAND PARESTHESIA: Primary | ICD-10-CM

## 2023-08-09 DIAGNOSIS — S42.402S UNSPECIFIED FRACTURE OF LOWER END OF LEFT HUMERUS, SEQUELA: ICD-10-CM

## 2023-08-09 DIAGNOSIS — R26.89 IMPAIRMENT OF BALANCE: ICD-10-CM

## 2023-08-09 DIAGNOSIS — F43.9 REACTION TO SEVERE STRESS, UNSPECIFIED: ICD-10-CM

## 2023-08-09 DIAGNOSIS — H25.10 NUCLEAR SENILE CATARACT, UNSPECIFIED LATERALITY: ICD-10-CM

## 2023-08-09 DIAGNOSIS — R29.898 UPPER EXTREMITY WEAKNESS: ICD-10-CM

## 2023-08-09 DIAGNOSIS — I10 ESSENTIAL (PRIMARY) HYPERTENSION: ICD-10-CM

## 2023-08-09 DIAGNOSIS — G43.709 CHRONIC MIGRAINE WITHOUT AURA WITHOUT STATUS MIGRAINOSUS, NOT INTRACTABLE: ICD-10-CM

## 2023-08-09 NOTE — TELEPHONE ENCOUNTER
Nurse spoke with  from At [facility], Lacie Bucio who has agreed to accept patient's , Lovemadhav Gomez & possibly patient as well. Nurse emailed Home Based Primary Care Order packet to Lacie Bucio with At [facility]. Email: [email or web address] as safemail. Per Lacie Bucio, attempts to call patient & her son, Sarah Gordillo, thus far have failed, but she will try again tomorrow & set up intake for both  & wife. Nurse also sent patient a [employer] message giving her this information & asking her to please answer the phone when Lacie Bucio or her intake nurse call. PCP is aware & has given verbal order for referral of both patient & her  to At [facility].

## 2023-08-28 ENCOUNTER — TELEPHONE (OUTPATIENT)
Age: 83
End: 2023-08-28

## 2023-08-28 NOTE — TELEPHONE ENCOUNTER
drive anymore & Benjamin cannot drive. Jovany Pablo has all contact information for this office & she has the personal cell p cricket number of Lacie Tillmans with At [facility]. This nurse will touch base with Lacie Bucio from At [facility] tomorrow afternoon to see how the initial appts went between [employer]. Nurse did send a [employer] message to Jovany Pablo with a reminder of tomorrow's appts so both Bobbynoemi Khushbujason & her son Emigdio Vásquez would receive & read the message. PCP is aware of all interactions with Jovany Pablo today.

## 2023-08-29 ENCOUNTER — TELEPHONE (OUTPATIENT)
Age: 83
End: 2023-08-29

## 2023-08-29 NOTE — TELEPHONE ENCOUNTER
Nurse spoke with Antelmo Nunes Director At [facility], a Home Based Primary Care Provider. Ekaterina Peres stated that patient's initial home visit with At [facility] Physician Assistant, Twyla Cole was a great success & patient is now established with At [facility] for all future healthcare & primary care needs. If patient reaches out via call or MyChart, please remind her that she needs to reach out to At [facility] & ask for Physician Assistant Twyla Cole as patient is no longer a patient of [facility]. Patient is enrolled in At [facility] going forward.  Thank you

## 2023-09-21 ENCOUNTER — TELEPHONE (OUTPATIENT)
Age: 83
End: 2023-09-21

## 2023-09-21 NOTE — TELEPHONE ENCOUNTER
Referral recived from [facility] for the patient to be seen by Dr. Emmanuel Vazquez. I left a message for her to call us back if she wanted to make an appointment.

## 2023-10-23 ENCOUNTER — OFFICE VISIT (OUTPATIENT)
Age: 83
End: 2023-10-23
Payer: MEDICARE

## 2023-10-23 DIAGNOSIS — G56.22 ULNAR NEUROPATHY OF LEFT UPPER EXTREMITY: Primary | ICD-10-CM

## 2023-10-23 DIAGNOSIS — R51.9 BENIGN HEADACHE: ICD-10-CM

## 2023-10-23 PROCEDURE — 1123F ACP DISCUSS/DSCN MKR DOCD: CPT | Performed by: PSYCHIATRY & NEUROLOGY

## 2023-10-23 PROCEDURE — 99204 OFFICE O/P NEW MOD 45 MIN: CPT | Performed by: PSYCHIATRY & NEUROLOGY

## 2023-10-23 PROCEDURE — 1036F TOBACCO NON-USER: CPT | Performed by: PSYCHIATRY & NEUROLOGY

## 2023-10-23 PROCEDURE — G8428 CUR MEDS NOT DOCUMENT: HCPCS | Performed by: PSYCHIATRY & NEUROLOGY

## 2023-10-23 PROCEDURE — G8399 PT W/DXA RESULTS DOCUMENT: HCPCS | Performed by: PSYCHIATRY & NEUROLOGY

## 2023-10-23 PROCEDURE — G8484 FLU IMMUNIZE NO ADMIN: HCPCS | Performed by: PSYCHIATRY & NEUROLOGY

## 2023-10-23 PROCEDURE — G8419 CALC BMI OUT NRM PARAM NOF/U: HCPCS | Performed by: PSYCHIATRY & NEUROLOGY

## 2023-10-23 PROCEDURE — 1090F PRES/ABSN URINE INCON ASSESS: CPT | Performed by: PSYCHIATRY & NEUROLOGY

## 2023-10-23 ASSESSMENT — PATIENT HEALTH QUESTIONNAIRE - PHQ9
SUM OF ALL RESPONSES TO PHQ QUESTIONS 1-9: 1
SUM OF ALL RESPONSES TO PHQ QUESTIONS 1-9: 1
SUM OF ALL RESPONSES TO PHQ9 QUESTIONS 1 & 2: 1
1. LITTLE INTEREST OR PLEASURE IN DOING THINGS: 0
2. FEELING DOWN, DEPRESSED OR HOPELESS: 1
SUM OF ALL RESPONSES TO PHQ QUESTIONS 1-9: 1
SUM OF ALL RESPONSES TO PHQ QUESTIONS 1-9: 1

## 2023-10-23 NOTE — PATIENT INSTRUCTIONS
manner, we are asking our patients to assist us by calling your Pharmacy for all prescription refills, this will include also your  Mail Order Pharmacy. The pharmacy will contact our office electronically to continue the refill process. Please do not wait until the last minute to call your pharmacy. We need at least 48 hours (2days) to fill prescriptions. We also encourage you to call your pharmacy before going to  your prescription to make sure it is ready. With regard to controlled substance prescription refill requests (narcotic refills) that need to be picked up at our office, we ask your cooperation by providing us with at least 72 hours (3days) notice that you will need a refill. We will not refill narcotic prescription refill requests after 4:00pm on any weekday, Monday through Thursday, or after 2:00pm on Fridays, or on the weekends. We encourage everyone to explore another way of getting your prescription refill request processed using [employer], our patient web portal through our electronic medical record system. [employer] is an efficient and effective way to communicate your medication request directly to the office and  downloadable as an marilyn on your smart phone . [employer] also features a review functionality that allows you to view your medication list as well as leave messages for your physician. Are you ready to get connected? If so please review the attatched instructions or speak to any of our staff to get you set up right away! Thank you so much for your cooperation. Should you have any questions please contact our Practice Administrator.

## 2023-10-23 NOTE — PROGRESS NOTES
Patient refuses to let me take her vitals today.
pain strikes she gets a headache. She has never been a headache lady. She says she never gets a headache unless her arm is hurting. Record review finds a visit with Dr. Nya Goodwin dated October 23 of this year where she was following up for left supracondylar humerus ORIF subsequent hardware removal manipulation. She was said to have some numbness and tingling in the ulnar distribution and that is been chronic for her. She was referred to [facility] neurology for her headaches. Office visit with Dr. Elaine Jackman from April of this year where patient was reporting left arm upper extremity numbness for months hurting more when she uses the computer and cell phone. Left hand numbness to the left third fingers. Elbow was sore. Noted to have a history of a humeral fracture in April of this year hardware placement April 18. Removal of hardware by Dr. Nya Goodwin. She was referred to orthopedic and he question whether she needed an EMG. She was given Neurontin. EMG was attempted by Dr. Meena Edwards at [facility] July 17 of this year. She was unable to complete the nerve conduction studies over the needle EMG. The study was therefore nondiagnostic. The only thing he could say is that the left median motor evoked potential was normal.    Laboratory analysis from June of this year  Basic metabolic panel unremarkable  CBC unremarkable    Past Medical History:   Diagnosis Date    Benign essential HTN 10/05/2017    declines tx    Chest pain 06/02/2021    nuclear stress test 6/2/21    History of breast cancer 11/2004    right  - s/p lumpectomy w sentinal node 11/2004.   s/p XRT 1 mo.  could not tolerate tamoxifen    History of nephrolithiasis     Humerus fracture 04/08/2022     left comminuted intra-articular distal humerus fracture     Hyperparathyroidism ([address])      with normalized calcium 6/2022    IBS (irritable bowel syndrome)     Left femoral shaft fracture ([address]) 06/16/2019    Pinning Dr. Jose L Sheehan    Osteoporosis

## 2024-07-22 ENCOUNTER — OFFICE VISIT (OUTPATIENT)
Age: 84
End: 2024-07-22
Payer: MEDICARE

## 2024-07-22 VITALS
HEART RATE: 88 BPM | WEIGHT: 181 LBS | SYSTOLIC BLOOD PRESSURE: 146 MMHG | BODY MASS INDEX: 30.16 KG/M2 | HEIGHT: 65 IN | DIASTOLIC BLOOD PRESSURE: 70 MMHG

## 2024-07-22 DIAGNOSIS — R42 DIZZINESS: Primary | ICD-10-CM

## 2024-07-22 DIAGNOSIS — E21.3 HYPERPARATHYROIDISM (HCC): ICD-10-CM

## 2024-07-22 DIAGNOSIS — E04.1 THYROID NODULE: ICD-10-CM

## 2024-07-22 DIAGNOSIS — R82.81 PYURIA: ICD-10-CM

## 2024-07-22 DIAGNOSIS — I10 BENIGN ESSENTIAL HTN: ICD-10-CM

## 2024-07-22 LAB
BILIRUBIN, URINE, POC: NEGATIVE
BLOOD URINE, POC: ABNORMAL
GLUCOSE URINE, POC: NEGATIVE
KETONES, URINE, POC: NEGATIVE
LEUKOCYTE ESTERASE, URINE, POC: ABNORMAL
NITRITE, URINE, POC: NEGATIVE
PH, URINE, POC: 6 (ref 4.6–8)
PROTEIN,URINE, POC: NEGATIVE
SPECIFIC GRAVITY, URINE, POC: 1.02 (ref 1–1.03)
URINALYSIS CLARITY, POC: CLEAR
URINALYSIS COLOR, POC: YELLOW
UROBILINOGEN, POC: ABNORMAL

## 2024-07-22 PROCEDURE — G8399 PT W/DXA RESULTS DOCUMENT: HCPCS | Performed by: INTERNAL MEDICINE

## 2024-07-22 PROCEDURE — 3077F SYST BP >= 140 MM HG: CPT | Performed by: INTERNAL MEDICINE

## 2024-07-22 PROCEDURE — 3078F DIAST BP <80 MM HG: CPT | Performed by: INTERNAL MEDICINE

## 2024-07-22 PROCEDURE — 1036F TOBACCO NON-USER: CPT | Performed by: INTERNAL MEDICINE

## 2024-07-22 PROCEDURE — G8417 CALC BMI ABV UP PARAM F/U: HCPCS | Performed by: INTERNAL MEDICINE

## 2024-07-22 PROCEDURE — PBSHW AMB POC URINALYSIS DIP STICK AUTO W/O MICRO: Performed by: INTERNAL MEDICINE

## 2024-07-22 PROCEDURE — 99214 OFFICE O/P EST MOD 30 MIN: CPT | Performed by: INTERNAL MEDICINE

## 2024-07-22 PROCEDURE — 1090F PRES/ABSN URINE INCON ASSESS: CPT | Performed by: INTERNAL MEDICINE

## 2024-07-22 PROCEDURE — 1123F ACP DISCUSS/DSCN MKR DOCD: CPT | Performed by: INTERNAL MEDICINE

## 2024-07-22 PROCEDURE — 81003 URINALYSIS AUTO W/O SCOPE: CPT | Performed by: INTERNAL MEDICINE

## 2024-07-22 PROCEDURE — G8427 DOCREV CUR MEDS BY ELIG CLIN: HCPCS | Performed by: INTERNAL MEDICINE

## 2024-07-22 RX ORDER — AMLODIPINE BESYLATE 2.5 MG/1
2.5 TABLET ORAL DAILY
COMMUNITY
End: 2024-07-25 | Stop reason: ALTCHOICE

## 2024-07-22 NOTE — PROGRESS NOTES
A/P:    1. Dizziness  Increased, worse on amlodipine, by report. She will have a CBC, CMP and TSH today. Will consider alternate BP medication, pending results. Sending a urine culture to assess for infection, given pyuria.  - Comprehensive Metabolic Panel; Future  - TSH; Future  - CBC with Auto Differential; Future    2. Benign essential HTN  Elevated blood pressure, though her reading today is not likely accurate, as she had pain and asked me to stop before I could complete the blood pressure assessment. Currently on low dose amlodipine, but with increased dizziness and GI upset. Will consider recommending alternative medication, pending results.  - Comprehensive Metabolic Panel; Future    3. Hyperparathyroidism (HCC)  Noted on labs in the past, no recent evaluation. Will obtain labs today to follow.  - Comprehensive Metabolic Panel; Future  - PTH, Intact; Future    4. Thyroid nodule  Noted on US in the past, with normal TSH. Will repeat labs and US now.  - TSH; Future  - US HEAD NECK SOFT TISSUE THYROID; Future           An electronic signature was used to authenticate this note.    --Bria Kruse MD         HPI: Brandy Meyer is here for an acute visit.    Dizziness: Ms. Meyer is followed by home-based care provider, RAJAN Magallanes. She reports she was started on amlodipine 2.5 mg for blood pressure about six months ago.  She did not go back to them in January. Since then, she has had more dizziness and makes her sick to her stomach. She is also worried about her thyroid-has had nodules, no recent labs.   There are guests in her house right now, for six weeks, which has been stressful for her. She declines to have her blood pressure checked on her left arm due to chronic arm discomfort and tingling in her left arm after surgery. Reports she has history of lymph node excision on the right and should not have a BP checked on that side.     Ms. Meyer does not want to go back to the [place], says she was forced

## 2024-07-23 DIAGNOSIS — R82.81 PYURIA: ICD-10-CM

## 2024-07-24 DIAGNOSIS — E04.1 THYROID NODULE: ICD-10-CM

## 2024-07-24 DIAGNOSIS — I10 BENIGN ESSENTIAL HTN: ICD-10-CM

## 2024-07-24 DIAGNOSIS — R42 DIZZINESS: ICD-10-CM

## 2024-07-24 DIAGNOSIS — E21.3 HYPERPARATHYROIDISM (HCC): ICD-10-CM

## 2024-07-24 LAB
ALBUMIN SERPL-MCNC: 3.8 G/DL (ref 3.5–5)
ALBUMIN/GLOB SERPL: 1.5 (ref 1.1–2.2)
ALP SERPL-CCNC: 112 U/L (ref 45–117)
ALT SERPL-CCNC: 21 U/L (ref 12–78)
ANION GAP SERPL CALC-SCNC: 4 MMOL/L (ref 5–15)
AST SERPL-CCNC: 14 U/L (ref 15–37)
BACTERIA SPEC CULT: NORMAL
BASOPHILS # BLD: 0 K/UL (ref 0–0.1)
BASOPHILS NFR BLD: 1 % (ref 0–1)
BILIRUB SERPL-MCNC: 0.4 MG/DL (ref 0.2–1)
BUN SERPL-MCNC: 17 MG/DL (ref 6–20)
BUN/CREAT SERPL: 27 (ref 12–20)
CALCIUM SERPL-MCNC: 10.2 MG/DL (ref 8.5–10.1)
CALCIUM SERPL-MCNC: 10.3 MG/DL (ref 8.5–10.1)
CHLORIDE SERPL-SCNC: 108 MMOL/L (ref 97–108)
CO2 SERPL-SCNC: 28 MMOL/L (ref 21–32)
CREAT SERPL-MCNC: 0.64 MG/DL (ref 0.55–1.02)
DIFFERENTIAL METHOD BLD: NORMAL
EOSINOPHIL # BLD: 0.3 K/UL (ref 0–0.4)
EOSINOPHIL NFR BLD: 5 % (ref 0–7)
ERYTHROCYTE [DISTWIDTH] IN BLOOD BY AUTOMATED COUNT: 13.2 % (ref 11.5–14.5)
GLOBULIN SER CALC-MCNC: 2.6 G/DL (ref 2–4)
GLUCOSE SERPL-MCNC: 108 MG/DL (ref 65–100)
HCT VFR BLD AUTO: 40.3 % (ref 35–47)
HGB BLD-MCNC: 13.3 G/DL (ref 11.5–16)
IMM GRANULOCYTES # BLD AUTO: 0 K/UL (ref 0–0.04)
IMM GRANULOCYTES NFR BLD AUTO: 0 % (ref 0–0.5)
LYMPHOCYTES # BLD: 2.2 K/UL (ref 0.8–3.5)
LYMPHOCYTES NFR BLD: 32 % (ref 12–49)
MCH RBC QN AUTO: 29.8 PG (ref 26–34)
MCHC RBC AUTO-ENTMCNC: 33 G/DL (ref 30–36.5)
MCV RBC AUTO: 90.4 FL (ref 80–99)
MONOCYTES # BLD: 0.5 K/UL (ref 0–1)
MONOCYTES NFR BLD: 7 % (ref 5–13)
NEUTS SEG # BLD: 3.8 K/UL (ref 1.8–8)
NEUTS SEG NFR BLD: 55 % (ref 32–75)
NRBC # BLD: 0 K/UL (ref 0–0.01)
NRBC BLD-RTO: 0 PER 100 WBC
PLATELET # BLD AUTO: 248 K/UL (ref 150–400)
PMV BLD AUTO: 9.6 FL (ref 8.9–12.9)
POTASSIUM SERPL-SCNC: 4.4 MMOL/L (ref 3.5–5.1)
PROT SERPL-MCNC: 6.4 G/DL (ref 6.4–8.2)
PTH-INTACT SERPL-MCNC: 120.4 PG/ML (ref 18.4–88)
RBC # BLD AUTO: 4.46 M/UL (ref 3.8–5.2)
SERVICE CMNT-IMP: NORMAL
SODIUM SERPL-SCNC: 140 MMOL/L (ref 136–145)
TSH SERPL DL<=0.05 MIU/L-ACNC: 1.19 UIU/ML (ref 0.36–3.74)
WBC # BLD AUTO: 6.8 K/UL (ref 3.6–11)

## 2024-07-25 ENCOUNTER — TELEPHONE (OUTPATIENT)
Age: 84
End: 2024-07-25

## 2024-07-25 RX ORDER — LOSARTAN POTASSIUM 50 MG/1
50 TABLET ORAL DAILY
Qty: 90 TABLET | Refills: 1 | Status: SHIPPED | OUTPATIENT
Start: 2024-07-25

## 2024-07-25 NOTE — TELEPHONE ENCOUNTER
Called and spoke to Ms. Meyer about her labs and her blood pressure. She says she has never had a call back in all the time she has been with [facility].     Her labs look good, overall. I suggest she try taking an alternative blood pressure medication--losartan 50 mg daily--to see if she has less dizziness than with the amlodipine.  She will stop the amlodipine.     Her calcium level is a bit elevated, and parathyroid hormone is still high. I do not think surgical evaluation is indicated with this level of calcium alone, but could be considered if she has significant osteoporosis on BDT. Thyroid level is normal, US thyroid is pending. She is interested in surgery, but I advised her this can be discussed further when her US is back.

## 2024-08-07 ENCOUNTER — HOSPITAL ENCOUNTER (OUTPATIENT)
Facility: HOSPITAL | Age: 84
Discharge: HOME | End: 2024-08-10
Attending: INTERNAL MEDICINE
Payer: MEDICARE

## 2024-08-07 DIAGNOSIS — E04.1 THYROID NODULE: ICD-10-CM

## 2024-08-07 PROCEDURE — 76536 US EXAM OF HEAD AND NECK: CPT

## 2024-09-10 ENCOUNTER — OFFICE VISIT (OUTPATIENT)
Age: 84
End: 2024-09-10
Payer: MEDICARE

## 2024-09-10 VITALS
HEART RATE: 72 BPM | DIASTOLIC BLOOD PRESSURE: 102 MMHG | HEIGHT: 65 IN | OXYGEN SATURATION: 97 % | RESPIRATION RATE: 14 BRPM | WEIGHT: 181 LBS | SYSTOLIC BLOOD PRESSURE: 189 MMHG | BODY MASS INDEX: 30.16 KG/M2

## 2024-09-10 DIAGNOSIS — M79.602 PAIN IN LEFT ARM: ICD-10-CM

## 2024-09-10 DIAGNOSIS — I10 BENIGN ESSENTIAL HTN: Primary | ICD-10-CM

## 2024-09-10 DIAGNOSIS — E04.1 THYROID NODULE: ICD-10-CM

## 2024-09-10 DIAGNOSIS — E21.3 HYPERPARATHYROIDISM (HCC): ICD-10-CM

## 2024-09-10 PROCEDURE — 99214 OFFICE O/P EST MOD 30 MIN: CPT | Performed by: INTERNAL MEDICINE

## 2024-09-10 PROCEDURE — G8417 CALC BMI ABV UP PARAM F/U: HCPCS | Performed by: INTERNAL MEDICINE

## 2024-09-10 PROCEDURE — 1123F ACP DISCUSS/DSCN MKR DOCD: CPT | Performed by: INTERNAL MEDICINE

## 2024-09-10 PROCEDURE — 1036F TOBACCO NON-USER: CPT | Performed by: INTERNAL MEDICINE

## 2024-09-10 PROCEDURE — 1090F PRES/ABSN URINE INCON ASSESS: CPT | Performed by: INTERNAL MEDICINE

## 2024-09-10 PROCEDURE — G8399 PT W/DXA RESULTS DOCUMENT: HCPCS | Performed by: INTERNAL MEDICINE

## 2024-09-10 PROCEDURE — 3077F SYST BP >= 140 MM HG: CPT | Performed by: INTERNAL MEDICINE

## 2024-09-10 PROCEDURE — G8428 CUR MEDS NOT DOCUMENT: HCPCS | Performed by: INTERNAL MEDICINE

## 2024-09-10 PROCEDURE — 3080F DIAST BP >= 90 MM HG: CPT | Performed by: INTERNAL MEDICINE

## 2024-09-10 RX ORDER — LOSARTAN POTASSIUM 25 MG/1
25 TABLET ORAL DAILY
Qty: 30 TABLET | Refills: 2 | Status: SHIPPED | OUTPATIENT
Start: 2024-09-10

## 2024-09-10 ASSESSMENT — PATIENT HEALTH QUESTIONNAIRE - PHQ9
1. LITTLE INTEREST OR PLEASURE IN DOING THINGS: SEVERAL DAYS
SUM OF ALL RESPONSES TO PHQ QUESTIONS 1-9: 2
2. FEELING DOWN, DEPRESSED OR HOPELESS: SEVERAL DAYS
SUM OF ALL RESPONSES TO PHQ9 QUESTIONS 1 & 2: 2

## 2024-09-11 ENCOUNTER — PATIENT MESSAGE (OUTPATIENT)
Age: 84
End: 2024-09-11

## 2024-10-02 ENCOUNTER — OFFICE VISIT (OUTPATIENT)
Age: 84
End: 2024-10-02
Payer: MEDICARE

## 2024-10-02 VITALS
OXYGEN SATURATION: 99 % | SYSTOLIC BLOOD PRESSURE: 130 MMHG | WEIGHT: 181 LBS | RESPIRATION RATE: 16 BRPM | HEIGHT: 65 IN | BODY MASS INDEX: 30.16 KG/M2 | HEART RATE: 70 BPM | DIASTOLIC BLOOD PRESSURE: 70 MMHG

## 2024-10-02 DIAGNOSIS — I10 PRIMARY HYPERTENSION: ICD-10-CM

## 2024-10-02 DIAGNOSIS — Z01.818 PREOPERATIVE CLEARANCE: Primary | ICD-10-CM

## 2024-10-02 PROCEDURE — 1123F ACP DISCUSS/DSCN MKR DOCD: CPT | Performed by: INTERNAL MEDICINE

## 2024-10-02 PROCEDURE — G8484 FLU IMMUNIZE NO ADMIN: HCPCS | Performed by: INTERNAL MEDICINE

## 2024-10-02 PROCEDURE — 1090F PRES/ABSN URINE INCON ASSESS: CPT | Performed by: INTERNAL MEDICINE

## 2024-10-02 PROCEDURE — G8428 CUR MEDS NOT DOCUMENT: HCPCS | Performed by: INTERNAL MEDICINE

## 2024-10-02 PROCEDURE — G8399 PT W/DXA RESULTS DOCUMENT: HCPCS | Performed by: INTERNAL MEDICINE

## 2024-10-02 PROCEDURE — G8417 CALC BMI ABV UP PARAM F/U: HCPCS | Performed by: INTERNAL MEDICINE

## 2024-10-02 PROCEDURE — 93010 ELECTROCARDIOGRAM REPORT: CPT | Performed by: INTERNAL MEDICINE

## 2024-10-02 PROCEDURE — 99204 OFFICE O/P NEW MOD 45 MIN: CPT | Performed by: INTERNAL MEDICINE

## 2024-10-02 PROCEDURE — 3075F SYST BP GE 130 - 139MM HG: CPT | Performed by: INTERNAL MEDICINE

## 2024-10-02 PROCEDURE — 1036F TOBACCO NON-USER: CPT | Performed by: INTERNAL MEDICINE

## 2024-10-02 PROCEDURE — 93005 ELECTROCARDIOGRAM TRACING: CPT | Performed by: INTERNAL MEDICINE

## 2024-10-02 PROCEDURE — 3078F DIAST BP <80 MM HG: CPT | Performed by: INTERNAL MEDICINE

## 2024-10-02 RX ORDER — AMLODIPINE BESYLATE 2.5 MG/1
2.5 TABLET ORAL DAILY
Qty: 90 TABLET | Refills: 0 | Status: SHIPPED | OUTPATIENT
Start: 2024-10-02 | End: 2024-10-03

## 2024-10-02 ASSESSMENT — PATIENT HEALTH QUESTIONNAIRE - PHQ9
SUM OF ALL RESPONSES TO PHQ QUESTIONS 1-9: 0
2. FEELING DOWN, DEPRESSED OR HOPELESS: NOT AT ALL
SUM OF ALL RESPONSES TO PHQ9 QUESTIONS 1 & 2: 0
1. LITTLE INTEREST OR PLEASURE IN DOING THINGS: NOT AT ALL
SUM OF ALL RESPONSES TO PHQ QUESTIONS 1-9: 0

## 2024-10-02 NOTE — PROGRESS NOTES
[phone]  Fax : [phone]     [facility] -- Cardiology, [place]  [address]  [address]  [place], [place] [address]  Fax: [phone]    [facility] Cardiology  Call center: (P) [phone]  (F) [phone]    The patient (or guardian, if applicable) and other individuals in attendance with the patient were advised that Artificial Intelligence will be utilized during this visit to record and process the conversation to generate a clinical note. The patient (or guardian, if applicable) and other individuals in attendance at the appointment consented to the use of AI, including the recording.       Voice - recognition dictation software was used in  the generation of this note.  Errors may exist. if there is any potential confusion or discrepancy, please feel free to contact me for clarification

## 2024-10-03 ENCOUNTER — OFFICE VISIT (OUTPATIENT)
Age: 84
End: 2024-10-03

## 2024-10-03 VITALS
HEART RATE: 71 BPM | WEIGHT: 179 LBS | SYSTOLIC BLOOD PRESSURE: 132 MMHG | TEMPERATURE: 98.6 F | OXYGEN SATURATION: 98 % | RESPIRATION RATE: 16 BRPM | HEIGHT: 65 IN | BODY MASS INDEX: 29.82 KG/M2 | DIASTOLIC BLOOD PRESSURE: 74 MMHG

## 2024-10-03 DIAGNOSIS — E55.9 VITAMIN D DEFICIENCY: ICD-10-CM

## 2024-10-03 DIAGNOSIS — E04.2 MULTINODULAR GOITER: ICD-10-CM

## 2024-10-03 DIAGNOSIS — M81.0 OSTEOPOROSIS WITHOUT PATHOLOGICAL FRACTURE: ICD-10-CM

## 2024-10-03 DIAGNOSIS — E21.0 PRIMARY HYPERPARATHYROIDISM (HCC): Primary | ICD-10-CM

## 2024-10-03 LAB
25(OH)D3 SERPL-MCNC: 20.6 NG/ML (ref 30–100)
ALBUMIN SERPL-MCNC: 4.1 G/DL (ref 3.5–5)
ALBUMIN/GLOB SERPL: 1.3 (ref 1.1–2.2)
ALP SERPL-CCNC: 117 U/L (ref 45–117)
ALT SERPL-CCNC: 22 U/L (ref 12–78)
ANION GAP SERPL CALC-SCNC: 2 MMOL/L (ref 2–12)
AST SERPL-CCNC: 15 U/L (ref 15–37)
BILIRUB SERPL-MCNC: 0.4 MG/DL (ref 0.2–1)
BUN SERPL-MCNC: 20 MG/DL (ref 6–20)
BUN/CREAT SERPL: 29 (ref 12–20)
CALCIUM SERPL-MCNC: 10.3 MG/DL (ref 8.5–10.1)
CALCIUM SERPL-MCNC: 10.7 MG/DL (ref 8.5–10.1)
CHLORIDE SERPL-SCNC: 108 MMOL/L (ref 97–108)
CO2 SERPL-SCNC: 28 MMOL/L (ref 21–32)
CREAT SERPL-MCNC: 0.7 MG/DL (ref 0.55–1.02)
GLOBULIN SER CALC-MCNC: 3.1 G/DL (ref 2–4)
GLUCOSE SERPL-MCNC: 92 MG/DL (ref 65–100)
MAGNESIUM SERPL-MCNC: 2.4 MG/DL (ref 1.6–2.4)
PHOSPHATE SERPL-MCNC: 3 MG/DL (ref 2.6–4.7)
POTASSIUM SERPL-SCNC: 4.3 MMOL/L (ref 3.5–5.1)
PROT SERPL-MCNC: 7.2 G/DL (ref 6.4–8.2)
PTH-INTACT SERPL-MCNC: 176.2 PG/ML (ref 18.4–88)
SODIUM SERPL-SCNC: 138 MMOL/L (ref 136–145)

## 2024-10-03 NOTE — PROGRESS NOTES
[facility] DIABETES AND ENDOCRINOLOGY               Ijeoma Menon MD          Patient Information  Name : Brandy Meyer 84 y.o.     YOB: 1940         Referred by: Bria Kruse MD       Chief Complaint   Patient presents with    New Patient     Referred for Hyperparathyroidism         History of present illness:    Brandy Meyer is a 84 y.o. female here for evaluation of hypercalcemia/primary hyperparathyroidism  Primary hyperparathyroidism: High calcium with elevated PTH, history of nephrolithiasis, fractures, severe osteoporosis  History of XRT  No nausea,abdominal pain  Polyuria  No history of PUD    Multinodular goiter: S/p lobectomy    Osteoporosis 01/2019 T- 3.3 lumbar spine, T- 2.4 left radius, T- 2.8 of left femoral neck and T- 2.3 right femoral neck.         Past Medical History:   Diagnosis Date    Benign essential HTN 10/05/2017    declines tx    Chest pain 06/02/2021    nuclear stress test 6/2/21    History of breast cancer 11/2004    right  - s/p lumpectomy w sentinal node 11/2004.  s/p XRT 1 mo.  could not tolerate tamoxifen    History of nephrolithiasis     Humerus fracture 04/08/2022     left comminuted intra-articular distal humerus fracture     Hyperparathyroidism (HCC)      with normalized calcium 6/2022    IBS (irritable bowel syndrome)     Left femoral shaft fracture (HCC) 06/16/2019    Pinning Dr. Marc    Osteoporosis 01/2019    T- 3.3 lumbar spine, T- 2.4 left radius, T- 2.8 of left femoral neck and T- 2.3 right femoral neck.     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  last 9/2017       Current Outpatient Medications   Medication Sig    amLODIPine (NORVASC) 2.5 MG tablet Take 1 tablet by mouth daily (Patient not taking: Reported on 10/3/2024)     No current facility-administered medications for

## 2024-10-03 NOTE — PATIENT INSTRUCTIONS
Important     I have ordered medication/test and if you do not hear from the hospital in 7 business days  please call the number below for infusion center    [facility] [phone]        Benefits of osteoporosis medications  Medicines for osteoporosis help prevent bone loss and increase bone density, which decrease the fracturesi.The table below outlines the common osteoporosis drugs, and how much they reduce the risk of fractures.     Drug name How you take the medication Relative risk reduction for vertebral fracture  Relative risk reduction for hip fractures                           Zolendronate (Reclast) Yearly IV infusion 70% 41%     Denosumabviiiviii  (Prolia) Twice yearly injection under the skin 68% 40%               Romosozumab  ( Evenity) Monthly injection for 1 year  73%       Safety and side effects of osteoporosis medications    Common side effects of osteoporosis drugs    Flu-like symptoms: This is uncommon in oral osteoporosis drugs, but about 1 in 20 patients receiving the injectable osteoporosis drugs (reclast and prolia) can have flu-like symptoms for 1-3 days after the injection.   Low calcium (“hypocalcemia”): This is uncommon in oral osteoporosis drugs, but about 1-2 out of 100 patients receiving injectable osteoporosis drugs (reclast and prolia) can cause low calcium levels in the blood.     Rare side effects of osteoporosis drugs  Osteonecrosis of the jaw (ONJ):   0.01% over 10 years.In other words, one out of every 10,000 people to take this medication for 10 years could get ONJ.  ONJ is a rare condition where the bone of the lower or upper jaw becomes exposed (usually because of tooth extractions) and does not heal properly. This is a very rare side effect, and the risk is thought to be primarily for people on high doses of medication in the setting of cancer.     Atypical femoral fractures (AFF):   0.5% over 10 years.  In other words 5 out of every 1000 people to take this

## 2024-10-04 PROBLEM — M81.0 OSTEOPOROSIS WITHOUT PATHOLOGICAL FRACTURE: Status: ACTIVE | Noted: 2024-10-04

## 2024-10-04 RX ORDER — ACETAMINOPHEN 325 MG/1
650 TABLET ORAL
OUTPATIENT
Start: 2024-10-28

## 2024-10-04 RX ORDER — EPINEPHRINE 1 MG/ML
0.3 INJECTION, SOLUTION, CONCENTRATE INTRAVENOUS PRN
OUTPATIENT
Start: 2024-10-28

## 2024-10-04 RX ORDER — SODIUM CHLORIDE 9 MG/ML
INJECTION, SOLUTION INTRAVENOUS CONTINUOUS
OUTPATIENT
Start: 2024-10-28

## 2024-10-04 RX ORDER — DIPHENHYDRAMINE HYDROCHLORIDE 50 MG/ML
50 INJECTION INTRAMUSCULAR; INTRAVENOUS
OUTPATIENT
Start: 2024-10-28

## 2024-10-04 RX ORDER — ONDANSETRON 2 MG/ML
8 INJECTION INTRAMUSCULAR; INTRAVENOUS
OUTPATIENT
Start: 2024-10-28

## 2024-10-04 RX ORDER — ALBUTEROL SULFATE 90 UG/1
4 INHALANT RESPIRATORY (INHALATION) PRN
OUTPATIENT
Start: 2024-10-28

## 2024-10-04 RX ORDER — FAMOTIDINE 10 MG/ML
20 INJECTION, SOLUTION INTRAVENOUS
OUTPATIENT
Start: 2024-10-28

## 2024-10-06 LAB — 1,25(OH)2D3 SERPL-MCNC: 81.9 PG/ML (ref 24.8–81.5)

## 2024-10-08 ENCOUNTER — HOSPITAL ENCOUNTER (OUTPATIENT)
Facility: HOSPITAL | Age: 84
Discharge: HOME | End: 2024-10-11
Attending: INTERNAL MEDICINE
Payer: MEDICARE

## 2024-10-08 DIAGNOSIS — M81.0 OSTEOPOROSIS WITHOUT PATHOLOGICAL FRACTURE: ICD-10-CM

## 2024-10-08 LAB
ALBUMIN SERPL ELPH-MCNC: 3.9 G/DL (ref 2.9–4.4)
ALBUMIN/GLOB SERPL: 1.4 (ref 0.7–1.7)
ALPHA1 GLOB SERPL ELPH-MCNC: 0.2 G/DL (ref 0–0.4)
ALPHA2 GLOB SERPL ELPH-MCNC: 0.8 G/DL (ref 0.4–1)
B-GLOBULIN SERPL ELPH-MCNC: 1 G/DL (ref 0.7–1.3)
GAMMA GLOB SERPL ELPH-MCNC: 0.8 G/DL (ref 0.4–1.8)
GLOBULIN SER-MCNC: 2.8 G/DL (ref 2.2–3.9)
IGA SERPL-MCNC: 117 MG/DL (ref 64–422)
IGG SERPL-MCNC: 913 MG/DL (ref 586–1602)
IGM SERPL-MCNC: 85 MG/DL (ref 26–217)
INTERPRETATION SERPL IEP-IMP: NORMAL
M PROTEIN SERPL ELPH-MCNC: NORMAL G/DL
PROT SERPL-MCNC: 6.7 G/DL (ref 6–8.5)

## 2024-10-08 PROCEDURE — 77080 DXA BONE DENSITY AXIAL: CPT

## 2024-10-14 LAB — PTH RELATED PROT SERPL-SCNC: <2 PMOL/L

## 2024-10-18 ENCOUNTER — TELEPHONE (OUTPATIENT)
Age: 84
End: 2024-10-18

## 2024-10-18 NOTE — TELEPHONE ENCOUNTER
Patient is having surgery on 11/1/24, patient states she is really dizzy and needs at home care after surgery, she needs some guidance, she has Medicare and I gave her their number to call since she did ask for it, I gave her [phone].     Brandy   Contact Information  [phone] (Mobile)   [phone] (Home Phone)

## 2024-10-21 NOTE — TELEPHONE ENCOUNTER
Please call her and get more information.  If she is having an inpatient surgery was she will be admitted, then the surgeon (ortho?) should be able coordinate for her to have home health after surgery.

## 2024-10-29 ENCOUNTER — OFFICE VISIT (OUTPATIENT)
Age: 84
End: 2024-10-29
Payer: MEDICARE

## 2024-10-29 VITALS
OXYGEN SATURATION: 98 % | HEART RATE: 67 BPM | TEMPERATURE: 98.9 F | DIASTOLIC BLOOD PRESSURE: 76 MMHG | HEIGHT: 65 IN | WEIGHT: 184 LBS | SYSTOLIC BLOOD PRESSURE: 138 MMHG | BODY MASS INDEX: 30.66 KG/M2 | RESPIRATION RATE: 20 BRPM

## 2024-10-29 DIAGNOSIS — E21.0 PRIMARY HYPERPARATHYROIDISM (HCC): Primary | ICD-10-CM

## 2024-10-29 DIAGNOSIS — E55.9 VITAMIN D DEFICIENCY: ICD-10-CM

## 2024-10-29 DIAGNOSIS — E21.0 PRIMARY HYPERPARATHYROIDISM (HCC): ICD-10-CM

## 2024-10-29 DIAGNOSIS — M81.0 OSTEOPOROSIS WITHOUT PATHOLOGICAL FRACTURE: Primary | ICD-10-CM

## 2024-10-29 DIAGNOSIS — M81.0 OSTEOPOROSIS WITHOUT PATHOLOGICAL FRACTURE: ICD-10-CM

## 2024-10-29 PROBLEM — L02.419 CELLULITIS AND ABSCESS OF UPPER EXTREMITY: Status: ACTIVE | Noted: 2018-08-31

## 2024-10-29 PROBLEM — L03.119 CELLULITIS AND ABSCESS OF UPPER EXTREMITY: Status: ACTIVE | Noted: 2018-08-31

## 2024-10-29 PROBLEM — F41.8 ANXIETY WITH DEPRESSION: Status: ACTIVE | Noted: 2024-10-29

## 2024-10-29 PROCEDURE — 1036F TOBACCO NON-USER: CPT | Performed by: INTERNAL MEDICINE

## 2024-10-29 PROCEDURE — G2211 COMPLEX E/M VISIT ADD ON: HCPCS | Performed by: INTERNAL MEDICINE

## 2024-10-29 PROCEDURE — 3075F SYST BP GE 130 - 139MM HG: CPT | Performed by: INTERNAL MEDICINE

## 2024-10-29 PROCEDURE — 1123F ACP DISCUSS/DSCN MKR DOCD: CPT | Performed by: INTERNAL MEDICINE

## 2024-10-29 PROCEDURE — 99215 OFFICE O/P EST HI 40 MIN: CPT | Performed by: INTERNAL MEDICINE

## 2024-10-29 PROCEDURE — 1160F RVW MEDS BY RX/DR IN RCRD: CPT | Performed by: INTERNAL MEDICINE

## 2024-10-29 PROCEDURE — 3078F DIAST BP <80 MM HG: CPT | Performed by: INTERNAL MEDICINE

## 2024-10-29 PROCEDURE — G8484 FLU IMMUNIZE NO ADMIN: HCPCS | Performed by: INTERNAL MEDICINE

## 2024-10-29 PROCEDURE — 1090F PRES/ABSN URINE INCON ASSESS: CPT | Performed by: INTERNAL MEDICINE

## 2024-10-29 PROCEDURE — G8427 DOCREV CUR MEDS BY ELIG CLIN: HCPCS | Performed by: INTERNAL MEDICINE

## 2024-10-29 PROCEDURE — G8399 PT W/DXA RESULTS DOCUMENT: HCPCS | Performed by: INTERNAL MEDICINE

## 2024-10-29 PROCEDURE — 1126F AMNT PAIN NOTED NONE PRSNT: CPT | Performed by: INTERNAL MEDICINE

## 2024-10-29 PROCEDURE — G8417 CALC BMI ABV UP PARAM F/U: HCPCS | Performed by: INTERNAL MEDICINE

## 2024-10-29 PROCEDURE — 1159F MED LIST DOCD IN RCRD: CPT | Performed by: INTERNAL MEDICINE

## 2024-10-29 RX ORDER — DIPHENHYDRAMINE HYDROCHLORIDE 50 MG/ML
50 INJECTION INTRAMUSCULAR; INTRAVENOUS
OUTPATIENT
Start: 2024-10-29

## 2024-10-29 RX ORDER — EPINEPHRINE 1 MG/ML
0.3 INJECTION, SOLUTION, CONCENTRATE INTRAVENOUS PRN
OUTPATIENT
Start: 2024-10-29

## 2024-10-29 RX ORDER — SODIUM CHLORIDE 9 MG/ML
INJECTION, SOLUTION INTRAVENOUS CONTINUOUS
OUTPATIENT
Start: 2024-10-29

## 2024-10-29 RX ORDER — HEPARIN SODIUM (PORCINE) LOCK FLUSH IV SOLN 100 UNIT/ML 100 UNIT/ML
500 SOLUTION INTRAVENOUS PRN
OUTPATIENT
Start: 2024-10-29

## 2024-10-29 RX ORDER — ONDANSETRON 2 MG/ML
8 INJECTION INTRAMUSCULAR; INTRAVENOUS
OUTPATIENT
Start: 2024-10-29

## 2024-10-29 RX ORDER — ALBUTEROL SULFATE 90 UG/1
4 INHALANT RESPIRATORY (INHALATION) PRN
OUTPATIENT
Start: 2024-10-29

## 2024-10-29 RX ORDER — SODIUM CHLORIDE 9 MG/ML
5-250 INJECTION, SOLUTION INTRAVENOUS PRN
OUTPATIENT
Start: 2024-10-29

## 2024-10-29 RX ORDER — ACETAMINOPHEN 325 MG/1
650 TABLET ORAL
OUTPATIENT
Start: 2024-10-29

## 2024-10-29 RX ORDER — FAMOTIDINE 10 MG/ML
20 INJECTION, SOLUTION INTRAVENOUS
OUTPATIENT
Start: 2024-10-29

## 2024-10-29 RX ORDER — ZOLEDRONIC ACID 0.05 MG/ML
5 INJECTION, SOLUTION INTRAVENOUS ONCE
OUTPATIENT
Start: 2024-10-29 | End: 2024-10-29

## 2024-10-29 RX ORDER — SODIUM CHLORIDE 0.9 % (FLUSH) 0.9 %
5-40 SYRINGE (ML) INJECTION PRN
OUTPATIENT
Start: 2024-10-29

## 2024-10-29 NOTE — PROGRESS NOTES
Brandy Meyer is a 84 y.o. female here for   Chief Complaint   Patient presents with    Thyroid Problem       1. Have you been to the ER, urgent care clinic since your last visit?  Hospitalized since your last visit? -no    2. Have you seen or consulted any other health care providers outside of the [facility] since your last visit?  Include any pap smears or colon screening.-Dr. Tong - having surgery on Friday for carpal tunnel and Dr. PIERRE Cramer cardiology      
secondary causes negative  Replete vitamin D,   Discussed options, she is interested in romosozumab, no known CAD, side effects discussed  Maintain vitamin D intake  -Romosozumab-monthly injections for a year without interruption is difficult as she is planning to relocate, instead of Evenity she opted for Reclast  - Discussed medication side effects, risks, and benefits  - Advised adequate hydration before medication and Tylenol for body aches post-injection  - Continue vitamin D regimen    Vitamin D deficiency: 2000 units daily  Dietary sources      Multinodular goiter: Status post left lobectomy  Right lobe: 0.9 cm TR 4, 1.3 cm TI-RADS 3, 1 cm TI-RADS 3    Spent > 40 minutes on the day of the visit reviewing chart, examining, ordering/reviewing labs, counseling, discussing therapeutics and documentation in the medical record    There are no Patient Instructions on file for this visit.      No follow-ups on file.        Thank you for allowing me to participate in the care of this patient.    Ijeoma Menon MD        Patient /caregiver verbalized understanding   Voice-recognition software was used to generate this report, which may result in some phonetic-based errors in the grammar and contents.  Even though attempts were made to correct all the mistakes, some may have been missed and remained in the body of the report.

## 2024-10-29 NOTE — PATIENT INSTRUCTIONS
Important     I have ordered medication/test and if you do not hear from the hospital in 7 business days  please call the number below for infusion center    [facility] [phone]        Benefits of osteoporosis medications  Medicines for osteoporosis help prevent bone loss and increase bone density, which decrease the fracturesi.The table below outlines the common osteoporosis drugs, and how much they reduce the risk of fractures.     Drug name How you take the medication Relative risk reduction for vertebral fracture  Relative risk reduction for hip fractures                           Zolendronate (Reclast) Yearly IV infusion 70% 41%     Denosumabviiiviii  (Prolia) Twice yearly injection under the skin 68% 40%               Romosozumab  ( Evenity) Monthly injection for 1 year  73%       Safety and side effects of osteoporosis medications    Common side effects of osteoporosis drugs    Flu-like symptoms: This is uncommon in oral osteoporosis drugs, but about 1 in 20 patients receiving the injectable osteoporosis drugs (reclast and prolia) can have flu-like symptoms for 1-3 days after the injection.   Low calcium (“hypocalcemia”): This is uncommon in oral osteoporosis drugs, but about 1-2 out of 100 patients receiving injectable osteoporosis drugs (reclast and prolia) can cause low calcium levels in the blood.     Rare side effects of osteoporosis drugs  Osteonecrosis of the jaw (ONJ):   0.01% over 10 years.In other words, one out of every 10,000 people to take this medication for 10 years could get ONJ.  ONJ is a rare condition where the bone of the lower or upper jaw becomes exposed (usually because of tooth extractions) and does not heal properly. This is a very rare side effect, and the risk is thought to be primarily for people on high doses of medication in the setting of cancer.     Atypical femoral fractures (AFF):   0.5% over 10 years.  In other words 5 out of every 1000 people to take this

## 2024-11-15 ENCOUNTER — HOSPITAL ENCOUNTER (OUTPATIENT)
Facility: HOSPITAL | Age: 84
Setting detail: INFUSION SERIES
Discharge: HOME | End: 2024-11-15
Payer: MEDICARE

## 2024-11-15 VITALS
RESPIRATION RATE: 16 BRPM | OXYGEN SATURATION: 98 % | BODY MASS INDEX: 30.26 KG/M2 | SYSTOLIC BLOOD PRESSURE: 132 MMHG | TEMPERATURE: 97.5 F | HEIGHT: 65 IN | HEART RATE: 68 BPM | DIASTOLIC BLOOD PRESSURE: 72 MMHG | WEIGHT: 181.6 LBS

## 2024-11-15 DIAGNOSIS — M81.0 OSTEOPOROSIS WITHOUT PATHOLOGICAL FRACTURE: Primary | ICD-10-CM

## 2024-11-15 LAB
ANION GAP BLD CALC-SCNC: 7.7 MMOL/L (ref 10–20)
CA-I BLD-MCNC: 1.38 MMOL/L (ref 1.15–1.33)
CHLORIDE BLD-SCNC: 105 MMOL/L (ref 98–107)
CO2 BLD-SCNC: 29.3 MMOL/L (ref 21–32)
CREAT BLD-MCNC: 0.62 MG/DL (ref 0.6–1.3)
GLUCOSE BLD-MCNC: 105 MG/DL (ref 74–99)
POTASSIUM BLD-SCNC: 4.2 MMOL/L (ref 3.5–5.1)
SERVICE CMNT-IMP: ABNORMAL
SODIUM BLD-SCNC: 142 MMOL/L (ref 136–145)

## 2024-11-15 PROCEDURE — 96374 THER/PROPH/DIAG INJ IV PUSH: CPT

## 2024-11-15 PROCEDURE — 6360000002 HC RX W HCPCS: Performed by: INTERNAL MEDICINE

## 2024-11-15 PROCEDURE — 80047 BASIC METABLC PNL IONIZED CA: CPT

## 2024-11-15 RX ORDER — ZOLEDRONIC ACID 0.05 MG/ML
5 INJECTION, SOLUTION INTRAVENOUS ONCE
OUTPATIENT
Start: 2025-11-09 | End: 2025-11-09

## 2024-11-15 RX ORDER — SODIUM CHLORIDE 9 MG/ML
5-250 INJECTION, SOLUTION INTRAVENOUS PRN
OUTPATIENT
Start: 2025-11-09

## 2024-11-15 RX ORDER — EPINEPHRINE 1 MG/ML
0.3 INJECTION, SOLUTION INTRAMUSCULAR; SUBCUTANEOUS PRN
OUTPATIENT
Start: 2025-11-09

## 2024-11-15 RX ORDER — FAMOTIDINE 10 MG/ML
20 INJECTION, SOLUTION INTRAVENOUS
OUTPATIENT
Start: 2025-11-09

## 2024-11-15 RX ORDER — SODIUM CHLORIDE 9 MG/ML
INJECTION, SOLUTION INTRAVENOUS CONTINUOUS
OUTPATIENT
Start: 2025-11-09

## 2024-11-15 RX ORDER — HEPARIN 100 UNIT/ML
500 SYRINGE INTRAVENOUS PRN
OUTPATIENT
Start: 2025-11-09

## 2024-11-15 RX ORDER — ZOLEDRONIC ACID 0.05 MG/ML
5 INJECTION, SOLUTION INTRAVENOUS ONCE
Status: COMPLETED | OUTPATIENT
Start: 2024-11-15 | End: 2024-11-15

## 2024-11-15 RX ORDER — ACETAMINOPHEN 325 MG/1
650 TABLET ORAL
OUTPATIENT
Start: 2025-11-09

## 2024-11-15 RX ORDER — DIPHENHYDRAMINE HYDROCHLORIDE 50 MG/ML
50 INJECTION INTRAMUSCULAR; INTRAVENOUS
OUTPATIENT
Start: 2025-11-09

## 2024-11-15 RX ORDER — SODIUM CHLORIDE 0.9 % (FLUSH) 0.9 %
5-40 SYRINGE (ML) INJECTION PRN
OUTPATIENT
Start: 2025-11-09

## 2024-11-15 RX ORDER — HYDROCORTISONE SODIUM SUCCINATE 100 MG/2ML
100 INJECTION INTRAMUSCULAR; INTRAVENOUS
OUTPATIENT
Start: 2025-11-09

## 2024-11-15 RX ORDER — ONDANSETRON 2 MG/ML
8 INJECTION INTRAMUSCULAR; INTRAVENOUS
OUTPATIENT
Start: 2025-11-09

## 2024-11-15 RX ORDER — ALBUTEROL SULFATE 90 UG/1
4 INHALANT RESPIRATORY (INHALATION) PRN
OUTPATIENT
Start: 2025-11-09

## 2024-11-15 RX ADMIN — ZOLEDRONIC ACID 5 MG: 0.05 INJECTION, SOLUTION INTRAVENOUS at 13:45

## 2024-11-15 NOTE — PROGRESS NOTES
OUTPATIENT INFUSION CENTER     DISCHARGE INSTRUCTIONS FOR:  ZOMETAJULIENNET (Zoledronic Acid):     1.  Be sure to inform your Dentist that you are taking this drug prior to invasive dental  procedures.  You should avoid major dental work while you are being treated with this     medicine.  Report any signs/symptoms of jaw pain to your physician immediately.     2.  This medicine needs to be taken on a fixed schedule.  If you miss a dose, make sure your doctor is informed.  You will also need to have frequent lab work done; try to keep all of your appointments.   Your doctor may also prescribe Vitamin D and calcium supplements.     3.  Make sure your doctor knows if you are taking fluid pills, arthritis medicines or antibiotics,  or if you have a history of problems with your gums, mouth or teeth.     4.         Drink some extra fluids during the 12-hour period before and after you receive Zometa.  Report any changes in urinary patterns (example: a decrease in how often you urinate).  Also report rapid weight gain, swelling of the hands, ankles or feet, unusual tiredness or weakness or unusual bleeding or bruising.     5.  You may resume your normal activities after your infusion.  Some people may have mild side effects from Zometa.  These side effects usually improve after the first infusion.  They may include:  Mild nausea, diarrhea, constipation or upset stomach;  Red or irritated eyes;  redness or itching at IV site;  Mild skin rash, mild numbness, tingling, or burning in extremities;  Headache, dizziness, mild muscle or joint pain, trouble sleeping;  Nasal congestion, runny nose, sneezing     6.   Signs/Symptoms of an allergic reaction may require immediate medical attention.        These are rare, but may include one or more of the following:      Skin - Swelling, blistering, weeping, crusting, rash, itching or;  Wheezing, cough, sore throat, chest tightness, chest pain or shortness of breath, irregular heart

## 2024-11-15 NOTE — PROGRESS NOTES
[facility] Progress Note    Date: November 15, 2024    Name: Brandy Meyer    MRN: [id number]         : 1940    Ms. Meyer arrived ambulatory and in no distress for  Reclast Infusion.  Assessment was completed, no acute issues at this time, no new complaints voiced.  24 G PIV established to right arm, labs drawn and processed.     Ms. Meyer's vitals were reviewed.  Vitals:    11/15/24 1300 11/15/24 1400   BP: 138/88 132/72   Pulse: 66 68   Resp: 18 16   Temp: 97.5 °F (36.4 °C)    TempSrc: Temporal    SpO2: 96% 98%   Weight: 82.4 kg (181 lb 9.6 oz)    Height: 1.651 m (5' 5\")         Lab results were obtained and reviewed, within parameters for treatment.   Recent Results (from the past 12 hour(s))   POC CHEM 8    Collection Time: 11/15/24  1:31 PM   Result Value Ref Range    POC Ionized Calcium 1.38 (H) 1.15 - 1.33 mmol/L    POC Sodium 142 136 - 145 mmol/L    POC Potassium 4.2 3.5 - 5.1 mmol/L    POC Chloride 105 98 - 107 mmol/L    POC TCO2 29.3 21 - 32 mmol/L    Anion Gap, POC 7.7 (L) 10 - 20 mmol/L    POC Glucose 105 (H) 74 - 99 mg/dL    POC Creatinine 0.62 0.6 - 1.3 mg/dL    eGFR, POC 88 >60 ml/min/1.73m2    UA Comment Comment Not Indicated.         Medications:  Medications Administered         zoledronic acid (RECLAST) 5 mg/100 mL infusion Admin Date  11/15/2024 Action  New Bag Dose  5 mg Rate  400 mL/hr Route  IntraVENous Documented By  Celi Ortez, RN             Ms. Meyer tolerated treatment well and was discharged from Outpatient Infusion Center in stable condition.   PIV flushed & removed. She is aware of future appointments.     Celi Ortez RN  November 15, 2024  Future Appointments   Date Time Provider Department Center   1/3/2025 11:00 AM BSC [facility]IN ECHO 1 [facility] BS AMB   2025  2:30 PM SPEC CDE LAB CDE BS AMB   2025 11:00 AM Deon Cramer MD [facility] BS AMB   2025  2:30 PM Ijeoma Menon MD CDE BS AMB

## 2025-01-03 ENCOUNTER — ANCILLARY PROCEDURE (OUTPATIENT)
Age: 85
End: 2025-01-03
Payer: MEDICARE

## 2025-01-03 VITALS
DIASTOLIC BLOOD PRESSURE: 90 MMHG | HEART RATE: 68 BPM | HEIGHT: 65 IN | BODY MASS INDEX: 30.16 KG/M2 | WEIGHT: 181 LBS | SYSTOLIC BLOOD PRESSURE: 130 MMHG

## 2025-01-03 DIAGNOSIS — I10 PRIMARY HYPERTENSION: ICD-10-CM

## 2025-01-03 LAB
ECHO AO ASC DIAM: 3.1 CM
ECHO AO ASCENDING AORTA INDEX: 1.63 CM/M2
ECHO AO ROOT DIAM: 3.4 CM
ECHO AO ROOT INDEX: 1.79 CM/M2
ECHO AV AREA PEAK VELOCITY: 4 CM2
ECHO AV AREA VTI: 4.3 CM2
ECHO AV AREA/BSA PEAK VELOCITY: 2.1 CM2/M2
ECHO AV AREA/BSA VTI: 2.3 CM2/M2
ECHO AV MEAN GRADIENT: 2 MMHG
ECHO AV MEAN VELOCITY: 0.7 M/S
ECHO AV PEAK GRADIENT: 5 MMHG
ECHO AV PEAK VELOCITY: 1.1 M/S
ECHO AV VELOCITY RATIO: 1.27
ECHO AV VTI: 20.3 CM
ECHO BSA: 1.94 M2
ECHO EST RA PRESSURE: 3 MMHG
ECHO LA DIAMETER INDEX: 2.26 CM/M2
ECHO LA DIAMETER: 4.3 CM
ECHO LA TO AORTIC ROOT RATIO: 1.26
ECHO LA VOL A-L A2C: 65 ML (ref 22–52)
ECHO LA VOL A-L A4C: 54 ML (ref 22–52)
ECHO LA VOL BP: 60 ML (ref 22–52)
ECHO LA VOL MOD A2C: 63 ML (ref 22–52)
ECHO LA VOL MOD A4C: 51 ML (ref 22–52)
ECHO LA VOL/BSA BIPLANE: 32 ML/M2 (ref 16–34)
ECHO LA VOLUME AREA LENGTH: 63 ML
ECHO LA VOLUME INDEX A-L A2C: 34 ML/M2 (ref 16–34)
ECHO LA VOLUME INDEX A-L A4C: 28 ML/M2 (ref 16–34)
ECHO LA VOLUME INDEX AREA LENGTH: 33 ML/M2 (ref 16–34)
ECHO LA VOLUME INDEX MOD A2C: 33 ML/M2 (ref 16–34)
ECHO LA VOLUME INDEX MOD A4C: 27 ML/M2 (ref 16–34)
ECHO LV E' LATERAL VELOCITY: 9.41 CM/S
ECHO LV E' SEPTAL VELOCITY: 5.74 CM/S
ECHO LV EDV A2C: 83 ML
ECHO LV EDV A4C: 97 ML
ECHO LV EDV BP: 89 ML (ref 56–104)
ECHO LV EDV INDEX A4C: 51 ML/M2
ECHO LV EDV INDEX BP: 47 ML/M2
ECHO LV EDV NDEX A2C: 44 ML/M2
ECHO LV EJECTION FRACTION A2C: 73 %
ECHO LV EJECTION FRACTION A4C: 69 %
ECHO LV EJECTION FRACTION BIPLANE: 69 % (ref 55–100)
ECHO LV ESV A2C: 23 ML
ECHO LV ESV A4C: 30 ML
ECHO LV ESV BP: 27 ML (ref 19–49)
ECHO LV ESV INDEX A2C: 12 ML/M2
ECHO LV ESV INDEX A4C: 16 ML/M2
ECHO LV ESV INDEX BP: 14 ML/M2
ECHO LV FRACTIONAL SHORTENING: 30 % (ref 28–44)
ECHO LV INTERNAL DIMENSION DIASTOLE INDEX: 2.63 CM/M2
ECHO LV INTERNAL DIMENSION DIASTOLIC: 5 CM (ref 3.9–5.3)
ECHO LV INTERNAL DIMENSION SYSTOLIC INDEX: 1.84 CM/M2
ECHO LV INTERNAL DIMENSION SYSTOLIC: 3.5 CM
ECHO LV IVSD: 1.2 CM (ref 0.6–0.9)
ECHO LV MASS 2D: 233.7 G (ref 67–162)
ECHO LV MASS INDEX 2D: 123 G/M2 (ref 43–95)
ECHO LV POSTERIOR WALL DIASTOLIC: 1.2 CM (ref 0.6–0.9)
ECHO LV RELATIVE WALL THICKNESS RATIO: 0.48
ECHO LVOT AREA: 3.1 CM2
ECHO LVOT AV VTI INDEX: 1.38
ECHO LVOT DIAM: 2 CM
ECHO LVOT MEAN GRADIENT: 3 MMHG
ECHO LVOT PEAK GRADIENT: 7 MMHG
ECHO LVOT PEAK VELOCITY: 1.4 M/S
ECHO LVOT STROKE VOLUME INDEX: 46.3 ML/M2
ECHO LVOT SV: 87.9 ML
ECHO LVOT VTI: 28 CM
ECHO MV A VELOCITY: 0.86 M/S
ECHO MV E DECELERATION TIME (DT): 273.5 MS
ECHO MV E VELOCITY: 0.57 M/S
ECHO MV E/A RATIO: 0.66
ECHO MV E/E' LATERAL: 6.06
ECHO MV E/E' RATIO (AVERAGED): 7.99
ECHO MV E/E' SEPTAL: 9.93
ECHO MV REGURGITANT PEAK GRADIENT: 100 MMHG
ECHO MV REGURGITANT PEAK VELOCITY: 5 M/S
ECHO PULMONARY ARTERY END DIASTOLIC PRESSURE: 4 MMHG
ECHO PV MAX VELOCITY: 0.9 M/S
ECHO PV PEAK GRADIENT: 3 MMHG
ECHO PV REGURGITANT MAX VELOCITY: 1 M/S
ECHO RIGHT VENTRICULAR SYSTOLIC PRESSURE (RVSP): 37 MMHG
ECHO RV BASAL DIMENSION: 3.5 CM
ECHO RV TAPSE: 1.8 CM (ref 1.7–?)
ECHO RVOT PEAK GRADIENT: 1 MMHG
ECHO RVOT PEAK VELOCITY: 0.6 M/S
ECHO TV REGURGITANT MAX VELOCITY: 2.92 M/S
ECHO TV REGURGITANT PEAK GRADIENT: 34 MMHG

## 2025-01-03 PROCEDURE — 93306 TTE W/DOPPLER COMPLETE: CPT | Performed by: INTERNAL MEDICINE

## 2025-01-03 NOTE — RESULT ENCOUNTER NOTE
Echo shows normal heart function with ejection fraction of 60 to 65%.  Mildly thickened heart wall and dilated left atrium due to Hypertension age-related changes with calcification of mitral leaflet nothing to worry continue same treatment

## 2025-01-22 DIAGNOSIS — E55.9 VITAMIN D DEFICIENCY: ICD-10-CM

## 2025-01-22 DIAGNOSIS — E21.0 PRIMARY HYPERPARATHYROIDISM (HCC): ICD-10-CM

## 2025-01-22 DIAGNOSIS — M81.0 OSTEOPOROSIS WITHOUT PATHOLOGICAL FRACTURE: ICD-10-CM

## 2025-01-22 LAB
25(OH)D3 SERPL-MCNC: 30.3 NG/ML (ref 30–100)
ANION GAP SERPL CALC-SCNC: 3 MMOL/L (ref 2–12)
BUN SERPL-MCNC: 15 MG/DL (ref 6–20)
BUN/CREAT SERPL: 23 (ref 12–20)
CALCIUM SERPL-MCNC: 9.8 MG/DL (ref 8.5–10.1)
CHLORIDE SERPL-SCNC: 107 MMOL/L (ref 97–108)
CO2 SERPL-SCNC: 28 MMOL/L (ref 21–32)
CREAT SERPL-MCNC: 0.66 MG/DL (ref 0.55–1.02)
GLUCOSE SERPL-MCNC: 100 MG/DL (ref 65–100)
POTASSIUM SERPL-SCNC: 4.4 MMOL/L (ref 3.5–5.1)
SODIUM SERPL-SCNC: 138 MMOL/L (ref 136–145)

## 2025-01-28 ENCOUNTER — OFFICE VISIT (OUTPATIENT)
Age: 85
End: 2025-01-28
Payer: MEDICARE

## 2025-01-28 VITALS
BODY MASS INDEX: 30.1 KG/M2 | HEIGHT: 65 IN | SYSTOLIC BLOOD PRESSURE: 130 MMHG | DIASTOLIC BLOOD PRESSURE: 80 MMHG | OXYGEN SATURATION: 98 % | RESPIRATION RATE: 20 BRPM | WEIGHT: 180.7 LBS | HEART RATE: 85 BPM

## 2025-01-28 DIAGNOSIS — E55.9 VITAMIN D DEFICIENCY: ICD-10-CM

## 2025-01-28 DIAGNOSIS — M81.0 OSTEOPOROSIS WITHOUT PATHOLOGICAL FRACTURE: Primary | ICD-10-CM

## 2025-01-28 DIAGNOSIS — E04.1 NONTOXIC UNINODULAR GOITER: ICD-10-CM

## 2025-01-28 DIAGNOSIS — E21.3 HYPERPARATHYROIDISM (HCC): ICD-10-CM

## 2025-01-28 PROCEDURE — G8427 DOCREV CUR MEDS BY ELIG CLIN: HCPCS | Performed by: INTERNAL MEDICINE

## 2025-01-28 PROCEDURE — 1036F TOBACCO NON-USER: CPT | Performed by: INTERNAL MEDICINE

## 2025-01-28 PROCEDURE — 99214 OFFICE O/P EST MOD 30 MIN: CPT | Performed by: INTERNAL MEDICINE

## 2025-01-28 PROCEDURE — 1126F AMNT PAIN NOTED NONE PRSNT: CPT | Performed by: INTERNAL MEDICINE

## 2025-01-28 PROCEDURE — G8417 CALC BMI ABV UP PARAM F/U: HCPCS | Performed by: INTERNAL MEDICINE

## 2025-01-28 PROCEDURE — 3075F SYST BP GE 130 - 139MM HG: CPT | Performed by: INTERNAL MEDICINE

## 2025-01-28 PROCEDURE — 1123F ACP DISCUSS/DSCN MKR DOCD: CPT | Performed by: INTERNAL MEDICINE

## 2025-01-28 PROCEDURE — G8399 PT W/DXA RESULTS DOCUMENT: HCPCS | Performed by: INTERNAL MEDICINE

## 2025-01-28 PROCEDURE — 3079F DIAST BP 80-89 MM HG: CPT | Performed by: INTERNAL MEDICINE

## 2025-01-28 PROCEDURE — 1160F RVW MEDS BY RX/DR IN RCRD: CPT | Performed by: INTERNAL MEDICINE

## 2025-01-28 PROCEDURE — G2211 COMPLEX E/M VISIT ADD ON: HCPCS | Performed by: INTERNAL MEDICINE

## 2025-01-28 PROCEDURE — 1159F MED LIST DOCD IN RCRD: CPT | Performed by: INTERNAL MEDICINE

## 2025-01-28 PROCEDURE — 1090F PRES/ABSN URINE INCON ASSESS: CPT | Performed by: INTERNAL MEDICINE

## 2025-01-28 ASSESSMENT — PATIENT HEALTH QUESTIONNAIRE - PHQ9
1. LITTLE INTEREST OR PLEASURE IN DOING THINGS: NOT AT ALL
SUM OF ALL RESPONSES TO PHQ QUESTIONS 1-9: 0
SUM OF ALL RESPONSES TO PHQ9 QUESTIONS 1 & 2: 0
SUM OF ALL RESPONSES TO PHQ QUESTIONS 1-9: 0
2. FEELING DOWN, DEPRESSED OR HOPELESS: NOT AT ALL

## 2025-01-28 NOTE — PROGRESS NOTES
[facility] DIABETES AND ENDOCRINOLOGY               Ijeoma Menon MD          Patient Information  Name : Brandy Meyer 84 y.o.     YOB: 1940         Referred by: Bria Kruse MD           Chief Complaint   Patient presents with    Follow-up    Osteoporosis    Thyroid Problem         History of present illness:    Brandy Meyer is a 84 y.o. female here for follow-up of hypercalcemia/primary hyperparathyroidism  Primary hyperparathyroidism: High calcium with elevated PTH, history of nephrolithiasis, fractures, severe osteoporosis  History of XRT    Got reclast last year   Multinodular goiter: S/p lobectomy    Osteoporosis 01/2019 T- 3.3 lumbar spine, T- 2.4 left radius, T- 2.8 of left femoral neck and T- 2.3 right femoral neck.           Past Medical History:   Diagnosis Date    Benign essential HTN 10/05/2017    declines tx    Chest pain 06/02/2021    nuclear stress test 6/2/21    History of breast cancer 11/2004    right  - s/p lumpectomy w sentinal node 11/2004.  s/p XRT 1 mo.  could not tolerate tamoxifen    History of nephrolithiasis     Humerus fracture 04/08/2022     left comminuted intra-articular distal humerus fracture     Hyperparathyroidism (HCC)      with normalized calcium 6/2022    IBS (irritable bowel syndrome)     Left femoral shaft fracture (HCC) 06/16/2019    Pinning Dr. Marc    Osteoporosis 01/2019    T- 3.3 lumbar spine, T- 2.4 left radius, T- 2.8 of left femoral neck and T- 2.3 right femoral neck.     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  last 9/2017       No current outpatient medications on file.     No current facility-administered medications for this visit.           Physical Examination:    General: pleasant, no distress, good eye contact  HEENT: no pallor, no periorbital edema, EOMI  Neck:

## 2025-01-28 NOTE — PROGRESS NOTES
Identified pt with two pt identifiers(name and ). Reviewed record in preparation for visit and have obtained necessary documentation. All patient medications has been reviewed.  Chief Complaint   Patient presents with    Follow-up    Osteoporosis    Thyroid Problem       Vitals:    25 1428   BP: 130/80   Pulse: 85   Resp: 20   SpO2: 98%                   Coordination of Care Questionnaire:   1) Have you been to an emergency room, urgent care, or hospitalized since your last visit?   yes       2. Have seen or consulted any other health care provider since your last visit? no        Patient is accompanied by self I have received verbal consent from Brandy Meyer to discuss any/all medical information while they are present in the room.

## 2025-02-07 ENCOUNTER — OFFICE VISIT (OUTPATIENT)
Age: 85
End: 2025-02-07
Payer: MEDICARE

## 2025-02-07 VITALS
OXYGEN SATURATION: 95 % | SYSTOLIC BLOOD PRESSURE: 150 MMHG | HEART RATE: 77 BPM | BODY MASS INDEX: 30.07 KG/M2 | HEIGHT: 65 IN | WEIGHT: 180.5 LBS | DIASTOLIC BLOOD PRESSURE: 90 MMHG

## 2025-02-07 DIAGNOSIS — M80.00XS OSTEOPOROSIS WITH CURRENT PATHOLOGICAL FRACTURE, UNSPECIFIED OSTEOPOROSIS TYPE, SEQUELA: ICD-10-CM

## 2025-02-07 DIAGNOSIS — M26.609 TMJ (TEMPOROMANDIBULAR JOINT SYNDROME): ICD-10-CM

## 2025-02-07 DIAGNOSIS — I10 PRIMARY HYPERTENSION: Primary | ICD-10-CM

## 2025-02-07 PROCEDURE — 99214 OFFICE O/P EST MOD 30 MIN: CPT | Performed by: INTERNAL MEDICINE

## 2025-02-07 PROCEDURE — 1159F MED LIST DOCD IN RCRD: CPT | Performed by: INTERNAL MEDICINE

## 2025-02-07 PROCEDURE — 1126F AMNT PAIN NOTED NONE PRSNT: CPT | Performed by: INTERNAL MEDICINE

## 2025-02-07 PROCEDURE — 1123F ACP DISCUSS/DSCN MKR DOCD: CPT | Performed by: INTERNAL MEDICINE

## 2025-02-07 PROCEDURE — G8427 DOCREV CUR MEDS BY ELIG CLIN: HCPCS | Performed by: INTERNAL MEDICINE

## 2025-02-07 PROCEDURE — 1090F PRES/ABSN URINE INCON ASSESS: CPT | Performed by: INTERNAL MEDICINE

## 2025-02-07 PROCEDURE — 3077F SYST BP >= 140 MM HG: CPT | Performed by: INTERNAL MEDICINE

## 2025-02-07 PROCEDURE — 1036F TOBACCO NON-USER: CPT | Performed by: INTERNAL MEDICINE

## 2025-02-07 PROCEDURE — G8417 CALC BMI ABV UP PARAM F/U: HCPCS | Performed by: INTERNAL MEDICINE

## 2025-02-07 PROCEDURE — G8399 PT W/DXA RESULTS DOCUMENT: HCPCS | Performed by: INTERNAL MEDICINE

## 2025-02-07 PROCEDURE — 3080F DIAST BP >= 90 MM HG: CPT | Performed by: INTERNAL MEDICINE

## 2025-02-07 RX ORDER — METOPROLOL SUCCINATE 25 MG/1
25 TABLET, EXTENDED RELEASE ORAL DAILY
Qty: 30 TABLET | Refills: 3 | Status: SHIPPED | OUTPATIENT
Start: 2025-02-07

## 2025-02-07 ASSESSMENT — PATIENT HEALTH QUESTIONNAIRE - PHQ9
SUM OF ALL RESPONSES TO PHQ QUESTIONS 1-9: 0
SUM OF ALL RESPONSES TO PHQ QUESTIONS 1-9: 0
SUM OF ALL RESPONSES TO PHQ9 QUESTIONS 1 & 2: 0
2. FEELING DOWN, DEPRESSED OR HOPELESS: NOT AT ALL
1. LITTLE INTEREST OR PLEASURE IN DOING THINGS: NOT AT ALL
SUM OF ALL RESPONSES TO PHQ QUESTIONS 1-9: 0
SUM OF ALL RESPONSES TO PHQ QUESTIONS 1-9: 0

## 2025-02-07 NOTE — PATIENT INSTRUCTIONS
Dear Brandy,    Thank you for visiting my office today. Your commitment to improving your health is commendable, and I am grateful for the opportunity to collaborate with you on your healthcare journey.    Following our discussion, here are the essential instructions and recommendations for your care plan:    Medications:    Begin taking Metoprolol at bedtime to assist in managing your blood pressure and stress. Please be vigilant about potential side effects and contact me if you experience any.    Tests and Monitoring:    Acquire an arm cuff blood pressure monitor (note: wrist monitors may provide inaccurate readings). Document your blood pressure at various times throughout the day and communicate these readings to me.    Lifestyle and Activity:    Continue with your water aerobics sessions and resume walking as you have planned. Additionally, address the air quality in your home to mitigate lightheadedness.    Follow-Up Care:    Schedule a follow-up visit in approximately 6 to 7 months to evaluate your progress. However, we should meet in six weeks to review the initial effects of the new medication.    Please report any significant changes in your health, such as hair loss or skin changes, or if you encounter any side effects from the medication.    It was a pleasure to meet you, and I look forward to our continued partnership in managing your health.    Best regards,    Dr. Deon Cramer MD  Cardiology

## 2025-02-25 ENCOUNTER — OFFICE VISIT (OUTPATIENT)
Age: 85
End: 2025-02-25

## 2025-02-25 VITALS
WEIGHT: 179 LBS | BODY MASS INDEX: 29.82 KG/M2 | SYSTOLIC BLOOD PRESSURE: 152 MMHG | HEART RATE: 71 BPM | OXYGEN SATURATION: 94 % | TEMPERATURE: 98.4 F | HEIGHT: 65 IN | RESPIRATION RATE: 18 BRPM | DIASTOLIC BLOOD PRESSURE: 70 MMHG

## 2025-02-25 DIAGNOSIS — L60.9 NAIL ABNORMALITY: Primary | ICD-10-CM

## 2025-02-25 RX ORDER — KETOCONAZOLE 20 MG/G
CREAM TOPICAL
Qty: 30 G | Refills: 1 | Status: SHIPPED | OUTPATIENT
Start: 2025-02-25

## 2025-02-25 RX ORDER — CEPHALEXIN 500 MG/1
500 CAPSULE ORAL 2 TIMES DAILY
Qty: 14 CAPSULE | Refills: 0 | Status: SHIPPED | OUTPATIENT
Start: 2025-02-25 | End: 2025-03-04

## 2025-02-25 NOTE — PROGRESS NOTES
2025   Brandy Meyer (: 1940) is a 84 y.o. female, New patient, here for evaluation of the following chief complaint(s):  Finger Pain (Pt possible thorn in right thumb )       ASSESSMENT/PLAN:  Below is the assessment and plan developed based on review of pertinent history, physical exam, labs, studies, and medications.  1. Nail abnormality  -     [facility], [place]       - Keflex  - ketoconazole      Handout given with care instructions  Pt with stable VS, non-toxic appearing  Pt in no acute distress and afebrile   4.   OTC for symptom management. Increase fluid intake, ensure adequate nutritional intake.  5.   Follow up with PCP as needed.  6.   Go to ED with development of any acute symptoms.     Follow up:  Return if symptoms worsen or fail to improve.  Follow up immediately for any new, worsening or changes or if symptoms are not improving over the next 5-7 days.     SUBJECTIVE/OBJECTIVE:  HPI       Finger Pain (Pt possible thorn in right thumb )    Sx for bout 1 year.  She has tried OTC treatment without full relief.  Nail tends to separate at distal nail as it grows.  Occasionally has redness/drainage along sides of nail.      Review of Systems   Constitutional:  Negative for fever.   Skin:         Nail right thumb starting to separate distally           Physical Exam  Musculoskeletal:      Comments: Right thumb: Nail intact slight elevation at distal end.  No significant swelling and mild erythema.  Bacterial vs fungal infection (paronychia)        Vitals:    25 1529 25 1537   BP: (!) 157/92 (!) 152/70   Site: Right Upper Arm    Position: Sitting    Cuff Size: Large Adult    Pulse: 71    Resp: 18    Temp: 98.4 °F (36.9 °C)    TempSrc: Oral    SpO2: 94%    Weight: 81.2 kg (179 lb)    Height: 1.651 m (5' 5\")         Allergies   Allergen Reactions    Latex Anaphylaxis and Rash     Varies, RASH    Hydrocortisone Other (See Comments)     throat closed up, ear felt

## 2025-03-21 ENCOUNTER — TELEPHONE (OUTPATIENT)
Facility: CLINIC | Age: 85
End: 2025-03-21

## 2025-03-21 NOTE — TELEPHONE ENCOUNTER
The patient is requesting a call back to discuss hair loss, heart medication, an a appointment with her original PCP. He seen her two years ago on 03/28/2022 so she would like to be advised if she is still a patient and to be assisted with an appointment. Psr offered an appointment she decline and stated that she will like to be advised.

## 2025-03-25 ENCOUNTER — OFFICE VISIT (OUTPATIENT)
Facility: CLINIC | Age: 85
End: 2025-03-25
Payer: MEDICARE

## 2025-03-25 VITALS
OXYGEN SATURATION: 97 % | TEMPERATURE: 98 F | SYSTOLIC BLOOD PRESSURE: 146 MMHG | BODY MASS INDEX: 29.99 KG/M2 | HEIGHT: 65 IN | HEART RATE: 71 BPM | WEIGHT: 180 LBS | RESPIRATION RATE: 14 BRPM | DIASTOLIC BLOOD PRESSURE: 90 MMHG

## 2025-03-25 DIAGNOSIS — L65.9 HAIR LOSS: Primary | ICD-10-CM

## 2025-03-25 DIAGNOSIS — R63.2 POLYPHAGIA: ICD-10-CM

## 2025-03-25 DIAGNOSIS — R53.83 OTHER FATIGUE: ICD-10-CM

## 2025-03-25 DIAGNOSIS — Z12.11 SCREENING FOR COLON CANCER: ICD-10-CM

## 2025-03-25 DIAGNOSIS — F43.22 ADJUSTMENT DISORDER WITH ANXIOUS MOOD: ICD-10-CM

## 2025-03-25 LAB
ALBUMIN SERPL-MCNC: 4.1 G/DL (ref 3.5–5)
ALBUMIN/GLOB SERPL: 1.5 (ref 1.1–2.2)
ALP SERPL-CCNC: 82 U/L (ref 45–117)
ALT SERPL-CCNC: 23 U/L (ref 12–78)
ANION GAP SERPL CALC-SCNC: 4 MMOL/L (ref 2–12)
AST SERPL-CCNC: 20 U/L (ref 15–37)
BILIRUB SERPL-MCNC: 0.4 MG/DL (ref 0.2–1)
BUN SERPL-MCNC: 18 MG/DL (ref 6–20)
BUN/CREAT SERPL: 23 (ref 12–20)
CALCIUM SERPL-MCNC: 10.7 MG/DL (ref 8.5–10.1)
CHLORIDE SERPL-SCNC: 107 MMOL/L (ref 97–108)
CO2 SERPL-SCNC: 30 MMOL/L (ref 21–32)
CREAT SERPL-MCNC: 0.77 MG/DL (ref 0.55–1.02)
ERYTHROCYTE [DISTWIDTH] IN BLOOD BY AUTOMATED COUNT: 13.4 % (ref 11.5–14.5)
EST. AVERAGE GLUCOSE BLD GHB EST-MCNC: 117 MG/DL
GLOBULIN SER CALC-MCNC: 2.7 G/DL (ref 2–4)
GLUCOSE SERPL-MCNC: 99 MG/DL (ref 65–100)
HBA1C MFR BLD: 5.7 % (ref 4–5.6)
HCT VFR BLD AUTO: 44.2 % (ref 35–47)
HGB BLD-MCNC: 13.9 G/DL (ref 11.5–16)
MCH RBC QN AUTO: 29.1 PG (ref 26–34)
MCHC RBC AUTO-ENTMCNC: 31.4 G/DL (ref 30–36.5)
MCV RBC AUTO: 92.5 FL (ref 80–99)
NRBC # BLD: 0 K/UL (ref 0–0.01)
NRBC BLD-RTO: 0 PER 100 WBC
PLATELET # BLD AUTO: 266 K/UL (ref 150–400)
PMV BLD AUTO: 10 FL (ref 8.9–12.9)
POTASSIUM SERPL-SCNC: 4.5 MMOL/L (ref 3.5–5.1)
PROT SERPL-MCNC: 6.8 G/DL (ref 6.4–8.2)
RBC # BLD AUTO: 4.78 M/UL (ref 3.8–5.2)
SODIUM SERPL-SCNC: 141 MMOL/L (ref 136–145)
TSH SERPL DL<=0.05 MIU/L-ACNC: 1.42 UIU/ML (ref 0.36–3.74)
VIT B12 SERPL-MCNC: 694 PG/ML (ref 193–986)
WBC # BLD AUTO: 6.7 K/UL (ref 3.6–11)

## 2025-03-25 PROCEDURE — 1090F PRES/ABSN URINE INCON ASSESS: CPT | Performed by: NURSE PRACTITIONER

## 2025-03-25 PROCEDURE — 1036F TOBACCO NON-USER: CPT | Performed by: NURSE PRACTITIONER

## 2025-03-25 PROCEDURE — G8399 PT W/DXA RESULTS DOCUMENT: HCPCS | Performed by: NURSE PRACTITIONER

## 2025-03-25 PROCEDURE — 3080F DIAST BP >= 90 MM HG: CPT | Performed by: NURSE PRACTITIONER

## 2025-03-25 PROCEDURE — 1123F ACP DISCUSS/DSCN MKR DOCD: CPT | Performed by: NURSE PRACTITIONER

## 2025-03-25 PROCEDURE — 99213 OFFICE O/P EST LOW 20 MIN: CPT | Performed by: NURSE PRACTITIONER

## 2025-03-25 PROCEDURE — 1159F MED LIST DOCD IN RCRD: CPT | Performed by: NURSE PRACTITIONER

## 2025-03-25 PROCEDURE — 3077F SYST BP >= 140 MM HG: CPT | Performed by: NURSE PRACTITIONER

## 2025-03-25 PROCEDURE — G8417 CALC BMI ABV UP PARAM F/U: HCPCS | Performed by: NURSE PRACTITIONER

## 2025-03-25 PROCEDURE — G8427 DOCREV CUR MEDS BY ELIG CLIN: HCPCS | Performed by: NURSE PRACTITIONER

## 2025-03-25 PROCEDURE — 1160F RVW MEDS BY RX/DR IN RCRD: CPT | Performed by: NURSE PRACTITIONER

## 2025-03-25 SDOH — ECONOMIC STABILITY: FOOD INSECURITY: WITHIN THE PAST 12 MONTHS, THE FOOD YOU BOUGHT JUST DIDN'T LAST AND YOU DIDN'T HAVE MONEY TO GET MORE.: NEVER TRUE

## 2025-03-25 SDOH — ECONOMIC STABILITY: FOOD INSECURITY: WITHIN THE PAST 12 MONTHS, YOU WORRIED THAT YOUR FOOD WOULD RUN OUT BEFORE YOU GOT MONEY TO BUY MORE.: NEVER TRUE

## 2025-03-25 ASSESSMENT — ENCOUNTER SYMPTOMS
BLOOD IN STOOL: 0
VOMITING: 0
EYES NEGATIVE: 1
SINUS PRESSURE: 0
ABDOMINAL PAIN: 0
RESPIRATORY NEGATIVE: 1
EYE REDNESS: 0
RHINORRHEA: 0
COUGH: 0
GASTROINTESTINAL NEGATIVE: 1
SHORTNESS OF BREATH: 0
NAUSEA: 0
CONSTIPATION: 0
EYE PAIN: 0
DIARRHEA: 0
BACK PAIN: 0
CHEST TIGHTNESS: 0
SINUS PAIN: 0

## 2025-03-25 ASSESSMENT — PATIENT HEALTH QUESTIONNAIRE - PHQ9
1. LITTLE INTEREST OR PLEASURE IN DOING THINGS: NOT AT ALL
SUM OF ALL RESPONSES TO PHQ QUESTIONS 1-9: 0
SUM OF ALL RESPONSES TO PHQ QUESTIONS 1-9: 0
2. FEELING DOWN, DEPRESSED OR HOPELESS: NOT AT ALL
SUM OF ALL RESPONSES TO PHQ QUESTIONS 1-9: 0
SUM OF ALL RESPONSES TO PHQ QUESTIONS 1-9: 0

## 2025-03-25 NOTE — PROGRESS NOTES
Assessment and Plan     1. Hair loss: Will rule out anemia v. Thyroid dx. Will consider referral to dermatologist if lab work is normal  -     CBC; Future  -     TSH; Future  2. Other fatigue: Mainly in the morning, improves as day goes by. Recommended treatment of insomnia as this could be early morning fatigue. Pt declines treatment   -     CBC; Future  -     TSH; Future  -     Vitamin B12; Future  3. Polyphagia: Will rule out diabetes.   -     Hemoglobin A1C; Future  -     Comprehensive Metabolic Panel; Future  4. Adjustment disorder with anxious mood: Medication treatment options discussed, pt declined treatment. Lab work ordered. Crisis plan discussed.   -     CBC; Future  -     TSH; Future  -     Comprehensive Metabolic Panel; Future  -     Vitamin B12; Future  5. Screening for colon cancer  -     [facility] - Rodger Pollock MD, Gastroenterology, [place]       Benefits, risks, possible drug interactions, and side effects of all new medications were reviewed with the patient. Pt verbalized understanding.    An electronic signature was used to authenticate this note.  Dedra Canela, APRN - CNP  3/25/2025      Follow-up and Dispositions    Return if symptoms worsen or fail to improve.          History of Present Illness   Chief Complaint     Brandy Meyer is a 84 y.o. female here for had concerns including Referral - General.   Mrs. Meyer presents today with reports hair loss, fatigue, increased hunger since Nov of last year. Pt reports symptoms starting after having Reclast infusion for osteoporosis. Follows up with Dr. Menon endocrinologist for osteoporosis. Pt also history of HTN, follows up with Dr. Cramer. Pt stays active by walking and hiking. Takes daily multivitamin. Reports she does not sleep well at night as she fells anxious. Has minimal support system, her   recently and both children live out of town. Denies SI/HI.    Review of Systems  Review of Systems   Constitutional:  Positive for

## 2025-03-26 ENCOUNTER — RESULTS FOLLOW-UP (OUTPATIENT)
Facility: CLINIC | Age: 85
End: 2025-03-26

## 2025-04-03 NOTE — ANESTHESIA PREPROCEDURE EVALUATION
1. Attention deficit hyperactivity disorder (ADHD), predominantly inattentive type  - amphetamine-dextroamphetamine (ADDERALL XR) 20 MG 24 hr capsule; Take 1 capsule (20 mg) by mouth daily  Dispense: 30 capsule; Refill: 0  - amphetamine-dextroamphetamine (ADDERALL XR) 20 MG 24 hr capsule; Take 1 capsule (20 mg) by mouth daily  Dispense: 30 capsule; Refill: 0  - amphetamine-dextroamphetamine (ADDERALL XR) 20 MG 24 hr capsule; Take 1 capsule (20 mg) by mouth daily  Dispense: 30 capsule; Refill: 0  - Call when 10 mg dose is due for refill      2. Anxiety  - escitalopram (LEXAPRO) 20 MG tablet; 1.5 tabs po every day for 30 mg daily  Dispense: 135 tablet; Refill: 1      Phone call duration: 12 minutes      Rosa BROOKS  [phone]      
Relevant Problems   No relevant active problems       Anesthetic History   No history of anesthetic complications            Review of Systems / Medical History  Patient summary reviewed, nursing notes reviewed and pertinent labs reviewed    Pulmonary  Within defined limits                 Neuro/Psych   Within defined limits           Cardiovascular    Hypertension                   GI/Hepatic/Renal  Within defined limits              Endo/Other  Within defined limits           Other Findings              Physical Exam    Airway  Mallampati: II  TM Distance: 4 - 6 cm  Neck ROM: normal range of motion   Mouth opening: Normal     Cardiovascular    Rhythm: regular  Rate: normal         Dental  No notable dental hx       Pulmonary  Breath sounds clear to auscultation               Abdominal         Other Findings            Anesthetic Plan    ASA: 2  Anesthesia type: spinal            Anesthetic plan and risks discussed with: Patient
contact guard

## 2025-04-11 ENCOUNTER — TELEPHONE (OUTPATIENT)
Age: 85
End: 2025-04-11

## 2025-04-11 NOTE — TELEPHONE ENCOUNTER
Patient stopped into the office and is having significant side effects. She is being woken up at night with stomach pains. She feels her face is pail and more wrinkly. She is feeling very weak. She is feeling like her circulation is bad and her skin is dried out. She hasn't been checking her bp. She wasn't able to get reception on her phone so she stopped in to say she does not want to take metoprolol anymore. She is feeling like her bp is elevated mainly because of fear and she'd prefer just to not take any medication.

## 2025-04-15 NOTE — TELEPHONE ENCOUNTER
Telephone call made to patient. Two patient identifiers verified. Reviewed Dr. Cramer's message with her; Advised her to call with home blood pressure readings. She verbalized understanding and was appreciative.

## 2025-06-27 ENCOUNTER — OFFICE VISIT (OUTPATIENT)
Age: 85
End: 2025-06-27

## 2025-06-27 VITALS — WEIGHT: 182 LBS | BODY MASS INDEX: 30.29 KG/M2

## 2025-06-27 DIAGNOSIS — T14.8XXA INFECTED ABRASION: Primary | ICD-10-CM

## 2025-06-27 DIAGNOSIS — L08.9 INFECTED ABRASION: Primary | ICD-10-CM

## 2025-06-27 RX ORDER — KETOROLAC TROMETHAMINE 10 MG/1
TABLET, FILM COATED ORAL
COMMUNITY
Start: 2025-04-25

## 2025-06-27 RX ORDER — MUPIROCIN 2 %
OINTMENT (GRAM) TOPICAL
Qty: 30 G | Refills: 1 | Status: SHIPPED | OUTPATIENT
Start: 2025-06-27

## 2025-06-27 RX ORDER — CYCLOBENZAPRINE HCL 10 MG
TABLET ORAL
COMMUNITY
Start: 2025-04-25

## 2025-06-27 ASSESSMENT — ENCOUNTER SYMPTOMS: COLOR CHANGE: 1

## 2025-06-27 NOTE — PROGRESS NOTES
2025   Brandy Meyer (: 1940) is a 84 y.o. female, New patient, here for evaluation of the following chief complaint(s):  Wound Check (C/o a wound to the left knee when she fell at the pool over the weekend. Also complaining about lower back pain from lifting a heavy box.)     ASSESSMENT/PLAN:  Below is the assessment and plan developed based on review of pertinent history, physical exam, labs, studies, and medications.       Assessment & Plan  Infected abrasion       Orders:    mupirocin (BACTROBAN) 2 % ointment; Apply topically 3 times daily.    Dressing      Handout given with care instructions  OTC for symptom management. Increase fluid intake, ensure adequate nutritional intake.  Follow up with PCP as needed.  Go to ED with development of any acute symptoms.     Follow up:  No follow-ups on file.  Follow up immediately for any new, worsening or changes or if symptoms are not improving over the next 5-7 days.     SUBJECTIVE/OBJECTIVE:  Patient fell, scraped left knee 6 days. She is here for wound care and treatment. She does not consent to vital signs being taken.       History provided by:  Patient   used: No    Wound Check  She was originally treated 5 to 10 days ago. Previous treatment included wound cleansing or irrigation. Her temperature was unmeasured prior to arrival. There has been colored discharge from the wound. There is new redness present. There is new swelling present.          Wound Check (C/o a wound to the left knee when she fell at the pool over the weekend. Also complaining about lower back pain from lifting a heavy box.)      No results found for any visits on 25.    No results found for this visit on 25.  XR Results (most recent):  @BSHSILASTIMGCAT([id number]:1)@         Review of Systems   Constitutional:  Negative for activity change, appetite change, chills, fatigue and fever.   Musculoskeletal:  Negative for gait problem and joint swelling.

## 2025-07-18 RX ORDER — METOPROLOL SUCCINATE 25 MG/1
25 TABLET, EXTENDED RELEASE ORAL DAILY
Qty: 90 TABLET | OUTPATIENT
Start: 2025-07-18

## 2025-07-18 NOTE — TELEPHONE ENCOUNTER
Per VO of MD    LOV: 2/7/2025     Future Appointments   Date Time Provider Department Center   8/8/2025 10:00 AM Deon Cramer MD [facility]       Requested Prescriptions     Refused Prescriptions Disp Refills    metoprolol succinate (TOPROL XL) 25 MG extended release tablet [Pharmacy Med Name: METOPROLOL ER SUCCINATE 25MG TABS] 90 tablet      Sig: TAKE 1 TABLET BY MOUTH DAILY     Refused By: PRAVEEN LI     Reason for Refusal: Medication discontinued

## 2025-07-29 RX ORDER — METOPROLOL SUCCINATE 25 MG/1
25 TABLET, EXTENDED RELEASE ORAL DAILY
Qty: 90 TABLET | Refills: 0 | Status: SHIPPED | OUTPATIENT
Start: 2025-07-29

## 2025-07-29 NOTE — TELEPHONE ENCOUNTER
Per VO of MD    LOV: 2/7/2025     Future Appointments   Date Time Provider Department Center   8/8/2025 10:00 AM Deon Chinchilla MD [facility]       Requested Prescriptions     Signed Prescriptions Disp Refills    metoprolol succinate (TOPROL XL) 25 MG extended release tablet 90 tablet 0     Sig: TAKE 1 TABLET BY MOUTH DAILY     Authorizing Provider: DEON CHINCHILLA     Ordering User: PRAVEEN LI

## 2025-08-08 ENCOUNTER — OFFICE VISIT (OUTPATIENT)
Age: 85
End: 2025-08-08

## 2025-08-08 VITALS
OXYGEN SATURATION: 95 % | DIASTOLIC BLOOD PRESSURE: 78 MMHG | SYSTOLIC BLOOD PRESSURE: 108 MMHG | WEIGHT: 182.7 LBS | HEART RATE: 60 BPM | BODY MASS INDEX: 30.44 KG/M2 | HEIGHT: 65 IN

## 2025-08-08 DIAGNOSIS — G89.29 CHRONIC JOINT PAIN: ICD-10-CM

## 2025-08-08 DIAGNOSIS — I10 PRIMARY HYPERTENSION: ICD-10-CM

## 2025-08-08 DIAGNOSIS — M80.00XS OSTEOPOROSIS WITH CURRENT PATHOLOGICAL FRACTURE, UNSPECIFIED OSTEOPOROSIS TYPE, SEQUELA: Primary | ICD-10-CM

## 2025-08-08 DIAGNOSIS — M25.50 CHRONIC JOINT PAIN: ICD-10-CM

## 2025-08-08 ASSESSMENT — PATIENT HEALTH QUESTIONNAIRE - PHQ9
SUM OF ALL RESPONSES TO PHQ QUESTIONS 1-9: 0
2. FEELING DOWN, DEPRESSED OR HOPELESS: NOT AT ALL
SUM OF ALL RESPONSES TO PHQ QUESTIONS 1-9: 0
1. LITTLE INTEREST OR PLEASURE IN DOING THINGS: NOT AT ALL
SUM OF ALL RESPONSES TO PHQ QUESTIONS 1-9: 0
SUM OF ALL RESPONSES TO PHQ QUESTIONS 1-9: 0

## 2025-08-28 ENCOUNTER — OFFICE VISIT (OUTPATIENT)
Facility: CLINIC | Age: 85
End: 2025-08-28
Payer: MEDICARE

## 2025-08-28 VITALS
OXYGEN SATURATION: 98 % | HEIGHT: 65 IN | WEIGHT: 179.2 LBS | RESPIRATION RATE: 16 BRPM | HEART RATE: 57 BPM | SYSTOLIC BLOOD PRESSURE: 136 MMHG | BODY MASS INDEX: 29.85 KG/M2 | DIASTOLIC BLOOD PRESSURE: 75 MMHG | TEMPERATURE: 98.4 F

## 2025-08-28 DIAGNOSIS — E21.3 HYPERPARATHYROIDISM: ICD-10-CM

## 2025-08-28 DIAGNOSIS — M51.360 DEGENERATION OF INTERVERTEBRAL DISC OF LUMBAR REGION WITH DISCOGENIC BACK PAIN: ICD-10-CM

## 2025-08-28 DIAGNOSIS — I10 PRIMARY HYPERTENSION: Primary | ICD-10-CM

## 2025-08-28 PROCEDURE — 1159F MED LIST DOCD IN RCRD: CPT | Performed by: INTERNAL MEDICINE

## 2025-08-28 PROCEDURE — 1036F TOBACCO NON-USER: CPT | Performed by: INTERNAL MEDICINE

## 2025-08-28 PROCEDURE — G8399 PT W/DXA RESULTS DOCUMENT: HCPCS | Performed by: INTERNAL MEDICINE

## 2025-08-28 PROCEDURE — 1090F PRES/ABSN URINE INCON ASSESS: CPT | Performed by: INTERNAL MEDICINE

## 2025-08-28 PROCEDURE — 99213 OFFICE O/P EST LOW 20 MIN: CPT | Performed by: INTERNAL MEDICINE

## 2025-08-28 PROCEDURE — G8427 DOCREV CUR MEDS BY ELIG CLIN: HCPCS | Performed by: INTERNAL MEDICINE

## 2025-08-28 PROCEDURE — 3078F DIAST BP <80 MM HG: CPT | Performed by: INTERNAL MEDICINE

## 2025-08-28 PROCEDURE — 1160F RVW MEDS BY RX/DR IN RCRD: CPT | Performed by: INTERNAL MEDICINE

## 2025-08-28 PROCEDURE — 1123F ACP DISCUSS/DSCN MKR DOCD: CPT | Performed by: INTERNAL MEDICINE

## 2025-08-28 PROCEDURE — G8417 CALC BMI ABV UP PARAM F/U: HCPCS | Performed by: INTERNAL MEDICINE

## 2025-08-28 PROCEDURE — 3075F SYST BP GE 130 - 139MM HG: CPT | Performed by: INTERNAL MEDICINE

## (undated) DEVICE — 6619 2 PTNT ISO SYS INCISE AREA&LT;(&GT;&&LT;)&GT;P: Brand: STERI-DRAPE™ IOBAN™ 2

## (undated) DEVICE — SOLUTION IV 1000ML 0.9% SOD CHL

## (undated) DEVICE — TAPE ADH W2INXL5.5YD FOAM E CNFRM MULTDIR STRTCH H2O RESIST

## (undated) DEVICE — PACK,BASIC,SIRUS,V: Brand: MEDLINE

## (undated) DEVICE — KIT POS FOAM HANA TBL

## (undated) DEVICE — CANNULATED DRILL

## (undated) DEVICE — ROCKER SWITCH PENCIL BLADE ELECTRODE, HOLSTER: Brand: EDGE

## (undated) DEVICE — STERILE POLYISOPRENE POWDER-FREE SURGICAL GLOVES: Brand: PROTEXIS

## (undated) DEVICE — SUTURE COAT VCRL SZ 4-0 L18IN ABSRB UD L19MM PS-2 1/2 CIR J496G

## (undated) DEVICE — TRAY CATH OD16FR SIL URIN M STATLOK STBL DEV SURSTP

## (undated) DEVICE — THREADED GUIDE WIRE

## (undated) DEVICE — 3000CC GUARDIAN II: Brand: GUARDIAN

## (undated) DEVICE — COVER LT HNDL PLAS RIG 1 PER PK

## (undated) DEVICE — INFECTION CONTROL KIT SYS

## (undated) DEVICE — DRAPE XR C ARM 41X74IN LF --

## (undated) DEVICE — APPLICATOR BNDG 1MM ADH PREMIERPRO EXOFIN

## (undated) DEVICE — (D)PREP SKN CHLRAPRP APPL 26ML -- CONVERT TO ITEM 371833

## (undated) DEVICE — GOWN,SIRUS,NONRNF,SETINSLV,2XL,18/CS: Brand: MEDLINE

## (undated) DEVICE — REM POLYHESIVE ADULT PATIENT RETURN ELECTRODE: Brand: VALLEYLAB

## (undated) DEVICE — STERILE POLYISOPRENE POWDER-FREE SURGICAL GLOVES WITH EMOLLIENT COATING: Brand: PROTEXIS

## (undated) DEVICE — SURGICAL PROCEDURE PACK BASIN MAJ SET CUST NO CAUT

## (undated) DEVICE — BANDAGE COMPR SELF ADH 5 YDX4 IN TAN STRL PREMIERPRO LF

## (undated) DEVICE — STAPLER SKIN 35CT WD STRL DISP -- MULTIFIRE PREMIUM